# Patient Record
Sex: MALE | Race: WHITE | NOT HISPANIC OR LATINO | Employment: OTHER | URBAN - METROPOLITAN AREA
[De-identification: names, ages, dates, MRNs, and addresses within clinical notes are randomized per-mention and may not be internally consistent; named-entity substitution may affect disease eponyms.]

---

## 2017-04-10 ENCOUNTER — LAB (OUTPATIENT)
Dept: LAB | Facility: HOSPITAL | Age: 67
End: 2017-04-10

## 2017-04-10 ENCOUNTER — TRANSCRIBE ORDERS (OUTPATIENT)
Dept: ADMINISTRATIVE | Facility: HOSPITAL | Age: 67
End: 2017-04-10

## 2017-04-10 DIAGNOSIS — I10 ESSENTIAL HYPERTENSION, MALIGNANT: Primary | ICD-10-CM

## 2017-04-10 DIAGNOSIS — E78.5 HYPERLIPIDEMIA, UNSPECIFIED HYPERLIPIDEMIA TYPE: ICD-10-CM

## 2017-04-10 DIAGNOSIS — I10 ESSENTIAL HYPERTENSION, MALIGNANT: ICD-10-CM

## 2017-04-10 DIAGNOSIS — E11.9 DIABETES MELLITUS WITHOUT COMPLICATION (HCC): ICD-10-CM

## 2017-04-10 LAB
ALBUMIN SERPL-MCNC: 3.6 G/DL (ref 3.5–5.2)
ALBUMIN UR-MCNC: <1.2 MG/L
ALBUMIN/GLOB SERPL: 1.2 G/DL
ALP SERPL-CCNC: 86 U/L (ref 39–117)
ALT SERPL W P-5'-P-CCNC: 13 U/L (ref 1–41)
ANION GAP SERPL CALCULATED.3IONS-SCNC: 13.3 MMOL/L
AST SERPL-CCNC: 15 U/L (ref 1–40)
BILIRUB SERPL-MCNC: 0.4 MG/DL (ref 0.1–1.2)
BILIRUB UR QL STRIP: NEGATIVE
BUN BLD-MCNC: 14 MG/DL (ref 8–23)
BUN/CREAT SERPL: 14.9 (ref 7–25)
CALCIUM SPEC-SCNC: 9.1 MG/DL (ref 8.6–10.5)
CHLORIDE SERPL-SCNC: 98 MMOL/L (ref 98–107)
CHOLEST SERPL-MCNC: 126 MG/DL (ref 0–200)
CLARITY UR: CLEAR
CO2 SERPL-SCNC: 27.7 MMOL/L (ref 22–29)
COLOR UR: YELLOW
CREAT BLD-MCNC: 0.94 MG/DL (ref 0.76–1.27)
CREAT UR-MCNC: 83 MG/DL
GFR SERPL CREATININE-BSD FRML MDRD: 80 ML/MIN/1.73
GLOBULIN UR ELPH-MCNC: 2.9 GM/DL
GLUCOSE BLD-MCNC: 122 MG/DL (ref 65–99)
GLUCOSE UR STRIP-MCNC: ABNORMAL MG/DL
HBA1C MFR BLD: 6.3 % (ref 4.8–5.6)
HDLC SERPL-MCNC: 37 MG/DL (ref 40–60)
HGB UR QL STRIP.AUTO: NEGATIVE
KETONES UR QL STRIP: ABNORMAL
LDLC SERPL CALC-MCNC: 54 MG/DL (ref 0–100)
LDLC/HDLC SERPL: 1.46 {RATIO}
LEUKOCYTE ESTERASE UR QL STRIP.AUTO: NEGATIVE
MICROALBUMIN/CREAT UR: NORMAL MG/G
NITRITE UR QL STRIP: NEGATIVE
PH UR STRIP.AUTO: 5.5 [PH] (ref 5–8)
POTASSIUM BLD-SCNC: 3.6 MMOL/L (ref 3.5–5.2)
PROT SERPL-MCNC: 6.5 G/DL (ref 6–8.5)
PROT UR QL STRIP: NEGATIVE
SODIUM BLD-SCNC: 139 MMOL/L (ref 136–145)
SP GR UR STRIP: 1.03 (ref 1–1.03)
TRIGL SERPL-MCNC: 175 MG/DL (ref 0–150)
UROBILINOGEN UR QL STRIP: ABNORMAL
VLDLC SERPL-MCNC: 35 MG/DL (ref 5–40)

## 2017-04-10 PROCEDURE — 36415 COLL VENOUS BLD VENIPUNCTURE: CPT

## 2017-04-10 PROCEDURE — 82570 ASSAY OF URINE CREATININE: CPT | Performed by: INTERNAL MEDICINE

## 2017-04-10 PROCEDURE — 83036 HEMOGLOBIN GLYCOSYLATED A1C: CPT | Performed by: INTERNAL MEDICINE

## 2017-04-10 PROCEDURE — 82043 UR ALBUMIN QUANTITATIVE: CPT | Performed by: INTERNAL MEDICINE

## 2017-04-10 PROCEDURE — 81003 URINALYSIS AUTO W/O SCOPE: CPT | Performed by: INTERNAL MEDICINE

## 2017-04-10 PROCEDURE — 80061 LIPID PANEL: CPT | Performed by: INTERNAL MEDICINE

## 2017-04-10 PROCEDURE — 80053 COMPREHEN METABOLIC PANEL: CPT | Performed by: INTERNAL MEDICINE

## 2017-04-12 ENCOUNTER — HOSPITAL ENCOUNTER (OUTPATIENT)
Dept: GENERAL RADIOLOGY | Facility: HOSPITAL | Age: 67
Discharge: HOME | End: 2017-04-12
Admitting: INTERNAL MEDICINE

## 2017-04-12 ENCOUNTER — TRANSCRIBE ORDERS (OUTPATIENT)
Dept: ADMINISTRATIVE | Facility: HOSPITAL | Age: 67
End: 2017-04-12

## 2017-04-12 DIAGNOSIS — M25.561 RIGHT KNEE PAIN, UNSPECIFIED CHRONICITY: Primary | ICD-10-CM

## 2017-04-12 DIAGNOSIS — M25.561 RIGHT KNEE PAIN, UNSPECIFIED CHRONICITY: ICD-10-CM

## 2017-04-12 PROCEDURE — 73560 X-RAY EXAM OF KNEE 1 OR 2: CPT

## 2017-07-17 ENCOUNTER — LAB (OUTPATIENT)
Dept: LAB | Facility: HOSPITAL | Age: 67
End: 2017-07-17

## 2017-07-17 ENCOUNTER — TRANSCRIBE ORDERS (OUTPATIENT)
Dept: ADMINISTRATIVE | Facility: HOSPITAL | Age: 67
End: 2017-07-17

## 2017-07-17 DIAGNOSIS — E78.5 HYPERLIPIDEMIA, UNSPECIFIED HYPERLIPIDEMIA TYPE: ICD-10-CM

## 2017-07-17 DIAGNOSIS — E11.9 DIABETES MELLITUS WITHOUT COMPLICATION (HCC): Primary | ICD-10-CM

## 2017-07-17 DIAGNOSIS — I10 ESSENTIAL HYPERTENSION, MALIGNANT: ICD-10-CM

## 2017-07-17 DIAGNOSIS — E11.9 DIABETES MELLITUS WITHOUT COMPLICATION (HCC): ICD-10-CM

## 2017-07-17 LAB
ALBUMIN SERPL-MCNC: 4.1 G/DL (ref 3.5–5.2)
ALBUMIN/GLOB SERPL: 1.4 G/DL
ALP SERPL-CCNC: 90 U/L (ref 39–117)
ALT SERPL W P-5'-P-CCNC: 21 U/L (ref 1–41)
ANION GAP SERPL CALCULATED.3IONS-SCNC: 12 MMOL/L
AST SERPL-CCNC: 19 U/L (ref 1–40)
BILIRUB SERPL-MCNC: 0.9 MG/DL (ref 0.1–1.2)
BILIRUB UR QL STRIP: NEGATIVE
BUN BLD-MCNC: 7 MG/DL (ref 8–23)
BUN/CREAT SERPL: 8.1 (ref 7–25)
CALCIUM SPEC-SCNC: 9.8 MG/DL (ref 8.6–10.5)
CHLORIDE SERPL-SCNC: 99 MMOL/L (ref 98–107)
CHOLEST SERPL-MCNC: 140 MG/DL (ref 0–200)
CLARITY UR: CLEAR
CO2 SERPL-SCNC: 29 MMOL/L (ref 22–29)
COLOR UR: ABNORMAL
CREAT BLD-MCNC: 0.86 MG/DL (ref 0.76–1.27)
GFR SERPL CREATININE-BSD FRML MDRD: 88 ML/MIN/1.73
GLOBULIN UR ELPH-MCNC: 3 GM/DL
GLUCOSE BLD-MCNC: 139 MG/DL (ref 65–99)
GLUCOSE UR STRIP-MCNC: ABNORMAL MG/DL
HBA1C MFR BLD: 6.2 % (ref 4.8–5.6)
HDLC SERPL-MCNC: 44 MG/DL (ref 40–60)
HGB UR QL STRIP.AUTO: NEGATIVE
KETONES UR QL STRIP: ABNORMAL
LDLC SERPL CALC-MCNC: 60 MG/DL (ref 0–100)
LDLC/HDLC SERPL: 1.37 {RATIO}
LEUKOCYTE ESTERASE UR QL STRIP.AUTO: NEGATIVE
NITRITE UR QL STRIP: NEGATIVE
PH UR STRIP.AUTO: 6 [PH] (ref 5–8)
POTASSIUM BLD-SCNC: 4.2 MMOL/L (ref 3.5–5.2)
PROT SERPL-MCNC: 7.1 G/DL (ref 6–8.5)
PROT UR QL STRIP: ABNORMAL
SODIUM BLD-SCNC: 140 MMOL/L (ref 136–145)
SP GR UR STRIP: >=1.03 (ref 1–1.03)
TRIGL SERPL-MCNC: 178 MG/DL (ref 0–150)
UROBILINOGEN UR QL STRIP: ABNORMAL
VLDLC SERPL-MCNC: 35.6 MG/DL (ref 5–40)

## 2017-07-17 PROCEDURE — 80053 COMPREHEN METABOLIC PANEL: CPT | Performed by: INTERNAL MEDICINE

## 2017-07-17 PROCEDURE — 80061 LIPID PANEL: CPT | Performed by: INTERNAL MEDICINE

## 2017-07-17 PROCEDURE — 36415 COLL VENOUS BLD VENIPUNCTURE: CPT

## 2017-07-17 PROCEDURE — 83036 HEMOGLOBIN GLYCOSYLATED A1C: CPT | Performed by: INTERNAL MEDICINE

## 2017-07-17 PROCEDURE — 81003 URINALYSIS AUTO W/O SCOPE: CPT | Performed by: INTERNAL MEDICINE

## 2017-11-15 ENCOUNTER — TRANSCRIBE ORDERS (OUTPATIENT)
Dept: ADMINISTRATIVE | Facility: HOSPITAL | Age: 67
End: 2017-11-15

## 2017-11-15 ENCOUNTER — LAB (OUTPATIENT)
Dept: LAB | Facility: HOSPITAL | Age: 67
End: 2017-11-15

## 2017-11-15 DIAGNOSIS — E78.5 HYPERLIPIDEMIA, UNSPECIFIED HYPERLIPIDEMIA TYPE: ICD-10-CM

## 2017-11-15 DIAGNOSIS — Z00.00 ROUTINE GENERAL MEDICAL EXAMINATION AT A HEALTH CARE FACILITY: ICD-10-CM

## 2017-11-15 DIAGNOSIS — Z00.00 ROUTINE GENERAL MEDICAL EXAMINATION AT A HEALTH CARE FACILITY: Primary | ICD-10-CM

## 2017-11-15 DIAGNOSIS — Z12.5 SPECIAL SCREENING FOR MALIGNANT NEOPLASM OF PROSTATE: ICD-10-CM

## 2017-11-15 DIAGNOSIS — I10 ESSENTIAL HYPERTENSION, MALIGNANT: ICD-10-CM

## 2017-11-15 DIAGNOSIS — E11.9 DIABETES MELLITUS WITHOUT COMPLICATION (HCC): ICD-10-CM

## 2017-11-15 LAB
ALBUMIN SERPL-MCNC: 3.6 G/DL (ref 3.5–5.2)
ALBUMIN/GLOB SERPL: 1.3 G/DL
ALP SERPL-CCNC: 77 U/L (ref 39–117)
ALT SERPL W P-5'-P-CCNC: 7 U/L (ref 1–41)
ANION GAP SERPL CALCULATED.3IONS-SCNC: 9.8 MMOL/L
AST SERPL-CCNC: 12 U/L (ref 1–40)
BASOPHILS # BLD AUTO: 0.02 10*3/MM3 (ref 0–0.2)
BASOPHILS NFR BLD AUTO: 0.2 % (ref 0–1.5)
BILIRUB SERPL-MCNC: 0.6 MG/DL (ref 0.1–1.2)
BILIRUB UR QL STRIP: NEGATIVE
BUN BLD-MCNC: 16 MG/DL (ref 8–23)
BUN/CREAT SERPL: 18 (ref 7–25)
CALCIUM SPEC-SCNC: 8.9 MG/DL (ref 8.6–10.5)
CHLORIDE SERPL-SCNC: 97 MMOL/L (ref 98–107)
CHOLEST SERPL-MCNC: 117 MG/DL (ref 0–200)
CLARITY UR: CLEAR
CO2 SERPL-SCNC: 30.2 MMOL/L (ref 22–29)
COLOR UR: YELLOW
CREAT BLD-MCNC: 0.89 MG/DL (ref 0.76–1.27)
DEPRECATED RDW RBC AUTO: 42.3 FL (ref 37–54)
EOSINOPHIL # BLD AUTO: 0.22 10*3/MM3 (ref 0–0.7)
EOSINOPHIL NFR BLD AUTO: 1.7 % (ref 0.3–6.2)
ERYTHROCYTE [DISTWIDTH] IN BLOOD BY AUTOMATED COUNT: 13.6 % (ref 11.5–14.5)
GFR SERPL CREATININE-BSD FRML MDRD: 85 ML/MIN/1.73
GLOBULIN UR ELPH-MCNC: 2.7 GM/DL
GLUCOSE BLD-MCNC: 143 MG/DL (ref 65–99)
GLUCOSE UR STRIP-MCNC: ABNORMAL MG/DL
HBA1C MFR BLD: 6.1 % (ref 4.8–5.6)
HCT VFR BLD AUTO: 44.4 % (ref 40.4–52.2)
HDLC SERPL-MCNC: 38 MG/DL (ref 40–60)
HGB BLD-MCNC: 15.1 G/DL (ref 13.7–17.6)
HGB UR QL STRIP.AUTO: NEGATIVE
IMM GRANULOCYTES # BLD: 0.07 10*3/MM3 (ref 0–0.03)
IMM GRANULOCYTES NFR BLD: 0.5 % (ref 0–0.5)
KETONES UR QL STRIP: ABNORMAL
LDLC SERPL CALC-MCNC: 50 MG/DL (ref 0–100)
LDLC/HDLC SERPL: 1.31 {RATIO}
LEUKOCYTE ESTERASE UR QL STRIP.AUTO: NEGATIVE
LYMPHOCYTES # BLD AUTO: 1.13 10*3/MM3 (ref 0.9–4.8)
LYMPHOCYTES NFR BLD AUTO: 8.9 % (ref 19.6–45.3)
MCH RBC QN AUTO: 29 PG (ref 27–32.7)
MCHC RBC AUTO-ENTMCNC: 34 G/DL (ref 32.6–36.4)
MCV RBC AUTO: 85.2 FL (ref 79.8–96.2)
MONOCYTES # BLD AUTO: 1.31 10*3/MM3 (ref 0.2–1.2)
MONOCYTES NFR BLD AUTO: 10.3 % (ref 5–12)
NEUTROPHILS # BLD AUTO: 9.99 10*3/MM3 (ref 1.9–8.1)
NEUTROPHILS NFR BLD AUTO: 78.4 % (ref 42.7–76)
NITRITE UR QL STRIP: NEGATIVE
PH UR STRIP.AUTO: 6 [PH] (ref 5–8)
PLATELET # BLD AUTO: 205 10*3/MM3 (ref 140–500)
PMV BLD AUTO: 10.7 FL (ref 6–12)
POTASSIUM BLD-SCNC: 4.2 MMOL/L (ref 3.5–5.2)
PROT SERPL-MCNC: 6.3 G/DL (ref 6–8.5)
PROT UR QL STRIP: ABNORMAL
PSA SERPL-MCNC: 4.04 NG/ML (ref 0–4)
RBC # BLD AUTO: 5.21 10*6/MM3 (ref 4.6–6)
SODIUM BLD-SCNC: 137 MMOL/L (ref 136–145)
SP GR UR STRIP: >=1.03 (ref 1–1.03)
TRIGL SERPL-MCNC: 147 MG/DL (ref 0–150)
UROBILINOGEN UR QL STRIP: ABNORMAL
VLDLC SERPL-MCNC: 29.4 MG/DL (ref 5–40)
WBC NRBC COR # BLD: 12.74 10*3/MM3 (ref 4.5–10.7)

## 2017-11-15 PROCEDURE — 36415 COLL VENOUS BLD VENIPUNCTURE: CPT

## 2017-11-15 PROCEDURE — 83036 HEMOGLOBIN GLYCOSYLATED A1C: CPT | Performed by: INTERNAL MEDICINE

## 2017-11-15 PROCEDURE — 80061 LIPID PANEL: CPT | Performed by: INTERNAL MEDICINE

## 2017-11-15 PROCEDURE — 81003 URINALYSIS AUTO W/O SCOPE: CPT | Performed by: INTERNAL MEDICINE

## 2017-11-15 PROCEDURE — 80053 COMPREHEN METABOLIC PANEL: CPT | Performed by: INTERNAL MEDICINE

## 2017-11-15 PROCEDURE — G0103 PSA SCREENING: HCPCS | Performed by: INTERNAL MEDICINE

## 2017-11-15 PROCEDURE — 85025 COMPLETE CBC W/AUTO DIFF WBC: CPT | Performed by: INTERNAL MEDICINE

## 2018-02-16 ENCOUNTER — TRANSCRIBE ORDERS (OUTPATIENT)
Dept: ADMINISTRATIVE | Facility: HOSPITAL | Age: 68
End: 2018-02-16

## 2018-02-16 ENCOUNTER — LAB (OUTPATIENT)
Dept: LAB | Facility: HOSPITAL | Age: 68
End: 2018-02-16

## 2018-02-16 DIAGNOSIS — I10 ESSENTIAL HYPERTENSION, MALIGNANT: ICD-10-CM

## 2018-02-16 DIAGNOSIS — E78.5 HYPERLIPIDEMIA, UNSPECIFIED HYPERLIPIDEMIA TYPE: ICD-10-CM

## 2018-02-16 DIAGNOSIS — E11.9 DIABETES MELLITUS WITHOUT COMPLICATION (HCC): Primary | ICD-10-CM

## 2018-02-16 DIAGNOSIS — E11.9 DIABETES MELLITUS WITHOUT COMPLICATION (HCC): ICD-10-CM

## 2018-02-16 LAB
ALBUMIN SERPL-MCNC: 3.8 G/DL (ref 3.5–5.2)
ALBUMIN/GLOB SERPL: 1.6 G/DL
ALP SERPL-CCNC: 82 U/L (ref 39–117)
ALT SERPL W P-5'-P-CCNC: 16 U/L (ref 1–41)
ANION GAP SERPL CALCULATED.3IONS-SCNC: 11.1 MMOL/L
AST SERPL-CCNC: 15 U/L (ref 1–40)
BASOPHILS # BLD AUTO: 0.03 10*3/MM3 (ref 0–0.2)
BASOPHILS NFR BLD AUTO: 0.4 % (ref 0–1.5)
BILIRUB SERPL-MCNC: 0.4 MG/DL (ref 0.1–1.2)
BILIRUB UR QL STRIP: NEGATIVE
BUN BLD-MCNC: 12 MG/DL (ref 8–23)
BUN/CREAT SERPL: 13.3 (ref 7–25)
CALCIUM SPEC-SCNC: 9.2 MG/DL (ref 8.6–10.5)
CHLORIDE SERPL-SCNC: 95 MMOL/L (ref 98–107)
CHOLEST SERPL-MCNC: 162 MG/DL (ref 0–200)
CLARITY UR: CLEAR
CO2 SERPL-SCNC: 30.9 MMOL/L (ref 22–29)
COLOR UR: YELLOW
CREAT BLD-MCNC: 0.9 MG/DL (ref 0.76–1.27)
DEPRECATED RDW RBC AUTO: 42 FL (ref 37–54)
EOSINOPHIL # BLD AUTO: 0.24 10*3/MM3 (ref 0–0.7)
EOSINOPHIL NFR BLD AUTO: 3.1 % (ref 0.3–6.2)
ERYTHROCYTE [DISTWIDTH] IN BLOOD BY AUTOMATED COUNT: 13.6 % (ref 11.5–14.5)
GFR SERPL CREATININE-BSD FRML MDRD: 84 ML/MIN/1.73
GLOBULIN UR ELPH-MCNC: 2.4 GM/DL
GLUCOSE BLD-MCNC: 172 MG/DL (ref 65–99)
GLUCOSE UR STRIP-MCNC: ABNORMAL MG/DL
HBA1C MFR BLD: 6.75 % (ref 4.8–5.6)
HCT VFR BLD AUTO: 46.1 % (ref 40.4–52.2)
HDLC SERPL-MCNC: 43 MG/DL (ref 40–60)
HGB BLD-MCNC: 15.4 G/DL (ref 13.7–17.6)
HGB UR QL STRIP.AUTO: NEGATIVE
IMM GRANULOCYTES # BLD: 0.08 10*3/MM3 (ref 0–0.03)
IMM GRANULOCYTES NFR BLD: 1 % (ref 0–0.5)
KETONES UR QL STRIP: NEGATIVE
LDLC SERPL CALC-MCNC: 81 MG/DL (ref 0–100)
LDLC/HDLC SERPL: 1.89 {RATIO}
LEUKOCYTE ESTERASE UR QL STRIP.AUTO: NEGATIVE
LYMPHOCYTES # BLD AUTO: 1.39 10*3/MM3 (ref 0.9–4.8)
LYMPHOCYTES NFR BLD AUTO: 17.8 % (ref 19.6–45.3)
MCH RBC QN AUTO: 28.5 PG (ref 27–32.7)
MCHC RBC AUTO-ENTMCNC: 33.4 G/DL (ref 32.6–36.4)
MCV RBC AUTO: 85.4 FL (ref 79.8–96.2)
MONOCYTES # BLD AUTO: 0.76 10*3/MM3 (ref 0.2–1.2)
MONOCYTES NFR BLD AUTO: 9.7 % (ref 5–12)
NEUTROPHILS # BLD AUTO: 5.31 10*3/MM3 (ref 1.9–8.1)
NEUTROPHILS NFR BLD AUTO: 68 % (ref 42.7–76)
NITRITE UR QL STRIP: NEGATIVE
PH UR STRIP.AUTO: 5.5 [PH] (ref 5–8)
PLATELET # BLD AUTO: 222 10*3/MM3 (ref 140–500)
PMV BLD AUTO: 10.1 FL (ref 6–12)
POTASSIUM BLD-SCNC: 3.9 MMOL/L (ref 3.5–5.2)
PROT SERPL-MCNC: 6.2 G/DL (ref 6–8.5)
PROT UR QL STRIP: ABNORMAL
RBC # BLD AUTO: 5.4 10*6/MM3 (ref 4.6–6)
SODIUM BLD-SCNC: 137 MMOL/L (ref 136–145)
SP GR UR STRIP: >=1.03 (ref 1–1.03)
TRIGL SERPL-MCNC: 189 MG/DL (ref 0–150)
UROBILINOGEN UR QL STRIP: ABNORMAL
VLDLC SERPL-MCNC: 37.8 MG/DL (ref 5–40)
WBC NRBC COR # BLD: 7.81 10*3/MM3 (ref 4.5–10.7)

## 2018-02-16 PROCEDURE — 80061 LIPID PANEL: CPT | Performed by: INTERNAL MEDICINE

## 2018-02-16 PROCEDURE — 81003 URINALYSIS AUTO W/O SCOPE: CPT | Performed by: INTERNAL MEDICINE

## 2018-02-16 PROCEDURE — 36415 COLL VENOUS BLD VENIPUNCTURE: CPT

## 2018-02-16 PROCEDURE — 83036 HEMOGLOBIN GLYCOSYLATED A1C: CPT | Performed by: INTERNAL MEDICINE

## 2018-02-16 PROCEDURE — 80053 COMPREHEN METABOLIC PANEL: CPT | Performed by: INTERNAL MEDICINE

## 2018-02-16 PROCEDURE — 85025 COMPLETE CBC W/AUTO DIFF WBC: CPT | Performed by: INTERNAL MEDICINE

## 2018-06-01 ENCOUNTER — TRANSCRIBE ORDERS (OUTPATIENT)
Dept: ADMINISTRATIVE | Facility: HOSPITAL | Age: 68
End: 2018-06-01

## 2018-06-01 ENCOUNTER — LAB (OUTPATIENT)
Dept: LAB | Facility: HOSPITAL | Age: 68
End: 2018-06-01

## 2018-06-01 DIAGNOSIS — E11.9 DIABETES MELLITUS WITHOUT COMPLICATION (HCC): Primary | ICD-10-CM

## 2018-06-01 DIAGNOSIS — I10 ESSENTIAL HYPERTENSION, MALIGNANT: ICD-10-CM

## 2018-06-01 DIAGNOSIS — E78.5 HYPERLIPIDEMIA, UNSPECIFIED HYPERLIPIDEMIA TYPE: ICD-10-CM

## 2018-06-01 DIAGNOSIS — E11.9 DIABETES MELLITUS WITHOUT COMPLICATION (HCC): ICD-10-CM

## 2018-06-01 DIAGNOSIS — R97.20 ELEVATED PSA: ICD-10-CM

## 2018-06-01 LAB
ALBUMIN SERPL-MCNC: 4 G/DL (ref 3.5–5.2)
ALBUMIN/GLOB SERPL: 1.4 G/DL
ALP SERPL-CCNC: 87 U/L (ref 39–117)
ALT SERPL W P-5'-P-CCNC: 13 U/L (ref 1–41)
ANION GAP SERPL CALCULATED.3IONS-SCNC: 9.3 MMOL/L
AST SERPL-CCNC: 11 U/L (ref 1–40)
BILIRUB SERPL-MCNC: 0.6 MG/DL (ref 0.1–1.2)
BILIRUB UR QL STRIP: NEGATIVE
BUN BLD-MCNC: 13 MG/DL (ref 8–23)
BUN/CREAT SERPL: 13.8 (ref 7–25)
CALCIUM SPEC-SCNC: 9.6 MG/DL (ref 8.6–10.5)
CHLORIDE SERPL-SCNC: 100 MMOL/L (ref 98–107)
CHOLEST SERPL-MCNC: 150 MG/DL (ref 0–200)
CLARITY UR: CLEAR
CO2 SERPL-SCNC: 29.7 MMOL/L (ref 22–29)
COLOR UR: YELLOW
CREAT BLD-MCNC: 0.94 MG/DL (ref 0.76–1.27)
GFR SERPL CREATININE-BSD FRML MDRD: 80 ML/MIN/1.73
GLOBULIN UR ELPH-MCNC: 2.9 GM/DL
GLUCOSE BLD-MCNC: 155 MG/DL (ref 65–99)
GLUCOSE UR STRIP-MCNC: ABNORMAL MG/DL
HBA1C MFR BLD: 6.83 % (ref 4.8–5.6)
HDLC SERPL-MCNC: 41 MG/DL (ref 40–60)
HGB UR QL STRIP.AUTO: NEGATIVE
KETONES UR QL STRIP: ABNORMAL
LDLC SERPL CALC-MCNC: 78 MG/DL (ref 0–100)
LDLC/HDLC SERPL: 1.89 {RATIO}
LEUKOCYTE ESTERASE UR QL STRIP.AUTO: NEGATIVE
NITRITE UR QL STRIP: NEGATIVE
PH UR STRIP.AUTO: 5.5 [PH] (ref 5–8)
POTASSIUM BLD-SCNC: 4.3 MMOL/L (ref 3.5–5.2)
PROT SERPL-MCNC: 6.9 G/DL (ref 6–8.5)
PROT UR QL STRIP: NEGATIVE
PSA SERPL-MCNC: 4.12 NG/ML (ref 0–4)
SODIUM BLD-SCNC: 139 MMOL/L (ref 136–145)
SP GR UR STRIP: >=1.03 (ref 1–1.03)
TRIGL SERPL-MCNC: 157 MG/DL (ref 0–150)
UROBILINOGEN UR QL STRIP: ABNORMAL
VLDLC SERPL-MCNC: 31.4 MG/DL (ref 5–40)

## 2018-06-01 PROCEDURE — 36415 COLL VENOUS BLD VENIPUNCTURE: CPT

## 2018-06-01 PROCEDURE — 80061 LIPID PANEL: CPT | Performed by: INTERNAL MEDICINE

## 2018-06-01 PROCEDURE — 80053 COMPREHEN METABOLIC PANEL: CPT | Performed by: INTERNAL MEDICINE

## 2018-06-01 PROCEDURE — 83036 HEMOGLOBIN GLYCOSYLATED A1C: CPT | Performed by: INTERNAL MEDICINE

## 2018-06-01 PROCEDURE — 81003 URINALYSIS AUTO W/O SCOPE: CPT | Performed by: INTERNAL MEDICINE

## 2018-06-01 PROCEDURE — 84153 ASSAY OF PSA TOTAL: CPT | Performed by: INTERNAL MEDICINE

## 2018-10-10 ENCOUNTER — TRANSCRIBE ORDERS (OUTPATIENT)
Dept: ADMINISTRATIVE | Facility: HOSPITAL | Age: 68
End: 2018-10-10

## 2018-10-10 ENCOUNTER — LAB (OUTPATIENT)
Dept: LAB | Facility: HOSPITAL | Age: 68
End: 2018-10-10

## 2018-10-10 DIAGNOSIS — E78.5 HYPERLIPIDEMIA, UNSPECIFIED HYPERLIPIDEMIA TYPE: ICD-10-CM

## 2018-10-10 DIAGNOSIS — I10 ESSENTIAL HYPERTENSION, MALIGNANT: ICD-10-CM

## 2018-10-10 DIAGNOSIS — E11.9 DIABETES MELLITUS WITHOUT COMPLICATION (HCC): ICD-10-CM

## 2018-10-10 DIAGNOSIS — E11.9 DIABETES MELLITUS WITHOUT COMPLICATION (HCC): Primary | ICD-10-CM

## 2018-10-10 LAB
ALBUMIN SERPL-MCNC: 3.9 G/DL (ref 3.5–5.2)
ALBUMIN/GLOB SERPL: 1.6 G/DL
ALP SERPL-CCNC: 75 U/L (ref 39–117)
ALT SERPL W P-5'-P-CCNC: 12 U/L (ref 1–41)
ANION GAP SERPL CALCULATED.3IONS-SCNC: 12.8 MMOL/L
AST SERPL-CCNC: 10 U/L (ref 1–40)
BACTERIA UR QL AUTO: NORMAL /HPF
BILIRUB SERPL-MCNC: 0.8 MG/DL (ref 0.1–1.2)
BILIRUB UR QL STRIP: NEGATIVE
BUN BLD-MCNC: 11 MG/DL (ref 8–23)
BUN/CREAT SERPL: 13.6 (ref 7–25)
CALCIUM SPEC-SCNC: 9.1 MG/DL (ref 8.6–10.5)
CHLORIDE SERPL-SCNC: 100 MMOL/L (ref 98–107)
CHOLEST SERPL-MCNC: 127 MG/DL (ref 0–200)
CLARITY UR: CLEAR
CO2 SERPL-SCNC: 27.2 MMOL/L (ref 22–29)
COLOR UR: YELLOW
CREAT BLD-MCNC: 0.81 MG/DL (ref 0.76–1.27)
GFR SERPL CREATININE-BSD FRML MDRD: 94 ML/MIN/1.73
GLOBULIN UR ELPH-MCNC: 2.4 GM/DL
GLUCOSE BLD-MCNC: 160 MG/DL (ref 65–99)
GLUCOSE UR STRIP-MCNC: NEGATIVE MG/DL
HBA1C MFR BLD: 7.2 % (ref 4.8–5.6)
HDLC SERPL-MCNC: 44 MG/DL (ref 40–60)
HGB UR QL STRIP.AUTO: NEGATIVE
HYALINE CASTS UR QL AUTO: NORMAL /LPF
KETONES UR QL STRIP: NEGATIVE
LDLC SERPL CALC-MCNC: 58 MG/DL (ref 0–100)
LDLC/HDLC SERPL: 1.31 {RATIO}
LEUKOCYTE ESTERASE UR QL STRIP.AUTO: NEGATIVE
NITRITE UR QL STRIP: NEGATIVE
PH UR STRIP.AUTO: 6 [PH] (ref 5–8)
POTASSIUM BLD-SCNC: 4 MMOL/L (ref 3.5–5.2)
PROT SERPL-MCNC: 6.3 G/DL (ref 6–8.5)
PROT UR QL STRIP: ABNORMAL
RBC # UR: NORMAL /HPF
REF LAB TEST METHOD: NORMAL
SODIUM BLD-SCNC: 140 MMOL/L (ref 136–145)
SP GR UR STRIP: 1.01 (ref 1–1.03)
SQUAMOUS #/AREA URNS HPF: NORMAL /HPF
TRIGL SERPL-MCNC: 127 MG/DL (ref 0–150)
UROBILINOGEN UR QL STRIP: ABNORMAL
VLDLC SERPL-MCNC: 25.4 MG/DL (ref 5–40)
WBC UR QL AUTO: NORMAL /HPF

## 2018-10-10 PROCEDURE — 80053 COMPREHEN METABOLIC PANEL: CPT | Performed by: INTERNAL MEDICINE

## 2018-10-10 PROCEDURE — 36415 COLL VENOUS BLD VENIPUNCTURE: CPT

## 2018-10-10 PROCEDURE — 83036 HEMOGLOBIN GLYCOSYLATED A1C: CPT | Performed by: INTERNAL MEDICINE

## 2018-10-10 PROCEDURE — 81001 URINALYSIS AUTO W/SCOPE: CPT | Performed by: INTERNAL MEDICINE

## 2018-10-10 PROCEDURE — 80061 LIPID PANEL: CPT | Performed by: INTERNAL MEDICINE

## 2019-01-11 ENCOUNTER — LAB (OUTPATIENT)
Dept: LAB | Facility: HOSPITAL | Age: 69
End: 2019-01-11

## 2019-01-11 ENCOUNTER — TRANSCRIBE ORDERS (OUTPATIENT)
Dept: ADMINISTRATIVE | Facility: HOSPITAL | Age: 69
End: 2019-01-11

## 2019-01-11 DIAGNOSIS — Z12.5 SPECIAL SCREENING FOR MALIGNANT NEOPLASM OF PROSTATE: ICD-10-CM

## 2019-01-11 DIAGNOSIS — Z00.00 ROUTINE GENERAL MEDICAL EXAMINATION AT A HEALTH CARE FACILITY: ICD-10-CM

## 2019-01-11 DIAGNOSIS — I10 ESSENTIAL HYPERTENSION, MALIGNANT: ICD-10-CM

## 2019-01-11 DIAGNOSIS — E11.9 DIABETES MELLITUS WITHOUT COMPLICATION (HCC): Primary | ICD-10-CM

## 2019-01-11 DIAGNOSIS — E11.9 DIABETES MELLITUS WITHOUT COMPLICATION (HCC): ICD-10-CM

## 2019-01-11 LAB
ALBUMIN SERPL-MCNC: 4.1 G/DL (ref 3.5–5.2)
ALBUMIN/GLOB SERPL: 1.8 G/DL
ALP SERPL-CCNC: 68 U/L (ref 39–117)
ALT SERPL W P-5'-P-CCNC: 15 U/L (ref 1–41)
ANION GAP SERPL CALCULATED.3IONS-SCNC: 12.3 MMOL/L
AST SERPL-CCNC: 17 U/L (ref 1–40)
BACTERIA UR QL AUTO: NORMAL /HPF
BASOPHILS # BLD AUTO: 0.02 10*3/MM3 (ref 0–0.2)
BASOPHILS NFR BLD AUTO: 0.3 % (ref 0–1.5)
BILIRUB SERPL-MCNC: 0.7 MG/DL (ref 0.1–1.2)
BILIRUB UR QL STRIP: NEGATIVE
BUN BLD-MCNC: 16 MG/DL (ref 8–23)
BUN/CREAT SERPL: 16.3 (ref 7–25)
CALCIUM SPEC-SCNC: 9.5 MG/DL (ref 8.6–10.5)
CHLORIDE SERPL-SCNC: 101 MMOL/L (ref 98–107)
CHOLEST SERPL-MCNC: 136 MG/DL (ref 0–200)
CLARITY UR: CLEAR
CO2 SERPL-SCNC: 27.7 MMOL/L (ref 22–29)
COLOR UR: YELLOW
CREAT BLD-MCNC: 0.98 MG/DL (ref 0.76–1.27)
DEPRECATED RDW RBC AUTO: 45.7 FL (ref 37–54)
EOSINOPHIL # BLD AUTO: 0.17 10*3/MM3 (ref 0–0.7)
EOSINOPHIL NFR BLD AUTO: 2.2 % (ref 0.3–6.2)
ERYTHROCYTE [DISTWIDTH] IN BLOOD BY AUTOMATED COUNT: 14.3 % (ref 11.5–14.5)
GFR SERPL CREATININE-BSD FRML MDRD: 76 ML/MIN/1.73
GLOBULIN UR ELPH-MCNC: 2.3 GM/DL
GLUCOSE BLD-MCNC: 140 MG/DL (ref 65–99)
GLUCOSE UR STRIP-MCNC: ABNORMAL MG/DL
HBA1C MFR BLD: 7.67 % (ref 4.8–5.6)
HCT VFR BLD AUTO: 52.4 % (ref 40.4–52.2)
HDLC SERPL-MCNC: 37 MG/DL (ref 40–60)
HGB BLD-MCNC: 16.9 G/DL (ref 13.7–17.6)
HGB UR QL STRIP.AUTO: NEGATIVE
HYALINE CASTS UR QL AUTO: NORMAL /LPF
IMM GRANULOCYTES # BLD AUTO: 0.08 10*3/MM3 (ref 0–0.03)
IMM GRANULOCYTES NFR BLD AUTO: 1 % (ref 0–0.5)
KETONES UR QL STRIP: ABNORMAL
LDLC SERPL CALC-MCNC: 66 MG/DL (ref 0–100)
LDLC/HDLC SERPL: 1.78 {RATIO}
LEUKOCYTE ESTERASE UR QL STRIP.AUTO: NEGATIVE
LYMPHOCYTES # BLD AUTO: 1.74 10*3/MM3 (ref 0.9–4.8)
LYMPHOCYTES NFR BLD AUTO: 22.3 % (ref 19.6–45.3)
MCH RBC QN AUTO: 28.5 PG (ref 27–32.7)
MCHC RBC AUTO-ENTMCNC: 32.3 G/DL (ref 32.6–36.4)
MCV RBC AUTO: 88.5 FL (ref 79.8–96.2)
MONOCYTES # BLD AUTO: 0.58 10*3/MM3 (ref 0.2–1.2)
MONOCYTES NFR BLD AUTO: 7.4 % (ref 5–12)
NEUTROPHILS # BLD AUTO: 5.3 10*3/MM3 (ref 1.9–8.1)
NEUTROPHILS NFR BLD AUTO: 67.8 % (ref 42.7–76)
NITRITE UR QL STRIP: NEGATIVE
PH UR STRIP.AUTO: 5.5 [PH] (ref 5–8)
PLATELET # BLD AUTO: 224 10*3/MM3 (ref 140–500)
PMV BLD AUTO: 10.5 FL (ref 6–12)
POTASSIUM BLD-SCNC: 4.2 MMOL/L (ref 3.5–5.2)
PROT SERPL-MCNC: 6.4 G/DL (ref 6–8.5)
PROT UR QL STRIP: ABNORMAL
PSA SERPL-MCNC: 5.24 NG/ML (ref 0–4)
RBC # BLD AUTO: 5.92 10*6/MM3 (ref 4.6–6)
RBC # UR: NORMAL /HPF
REF LAB TEST METHOD: NORMAL
SODIUM BLD-SCNC: 141 MMOL/L (ref 136–145)
SP GR UR STRIP: >=1.03 (ref 1–1.03)
SQUAMOUS #/AREA URNS HPF: NORMAL /HPF
TRIGL SERPL-MCNC: 165 MG/DL (ref 0–150)
UROBILINOGEN UR QL STRIP: ABNORMAL
VLDLC SERPL-MCNC: 33 MG/DL (ref 5–40)
WBC NRBC COR # BLD: 7.81 10*3/MM3 (ref 4.5–10.7)
WBC UR QL AUTO: NORMAL /HPF

## 2019-01-11 PROCEDURE — 85025 COMPLETE CBC W/AUTO DIFF WBC: CPT | Performed by: INTERNAL MEDICINE

## 2019-01-11 PROCEDURE — 83036 HEMOGLOBIN GLYCOSYLATED A1C: CPT | Performed by: INTERNAL MEDICINE

## 2019-01-11 PROCEDURE — 36415 COLL VENOUS BLD VENIPUNCTURE: CPT

## 2019-01-11 PROCEDURE — G0103 PSA SCREENING: HCPCS | Performed by: INTERNAL MEDICINE

## 2019-01-11 PROCEDURE — 81001 URINALYSIS AUTO W/SCOPE: CPT | Performed by: INTERNAL MEDICINE

## 2019-01-11 PROCEDURE — 80061 LIPID PANEL: CPT | Performed by: INTERNAL MEDICINE

## 2019-01-11 PROCEDURE — 80053 COMPREHEN METABOLIC PANEL: CPT | Performed by: INTERNAL MEDICINE

## 2019-01-25 ENCOUNTER — OFFICE VISIT (OUTPATIENT)
Dept: GASTROENTEROLOGY | Facility: CLINIC | Age: 69
End: 2019-01-25

## 2019-01-25 VITALS
HEIGHT: 69 IN | SYSTOLIC BLOOD PRESSURE: 132 MMHG | DIASTOLIC BLOOD PRESSURE: 72 MMHG | BODY MASS INDEX: 34.24 KG/M2 | TEMPERATURE: 99.1 F | WEIGHT: 231.2 LBS

## 2019-01-25 DIAGNOSIS — K21.9 GASTROESOPHAGEAL REFLUX DISEASE WITHOUT ESOPHAGITIS: ICD-10-CM

## 2019-01-25 DIAGNOSIS — R12 HEARTBURN: Primary | ICD-10-CM

## 2019-01-25 DIAGNOSIS — K63.5 POLYP OF ASCENDING COLON, UNSPECIFIED TYPE: ICD-10-CM

## 2019-01-25 PROCEDURE — 99204 OFFICE O/P NEW MOD 45 MIN: CPT | Performed by: INTERNAL MEDICINE

## 2019-01-25 RX ORDER — DAPAGLIFLOZIN 10 MG/1
1 TABLET, FILM COATED ORAL DAILY
Refills: 3 | COMMUNITY
Start: 2019-01-18

## 2019-01-25 RX ORDER — VALSARTAN AND HYDROCHLOROTHIAZIDE 160; 25 MG/1; MG/1
1 TABLET ORAL DAILY
Refills: 3 | COMMUNITY
Start: 2018-11-24

## 2019-01-25 RX ORDER — OMEPRAZOLE 20 MG/1
CAPSULE, DELAYED RELEASE ORAL
COMMUNITY
End: 2019-02-13

## 2019-01-25 RX ORDER — RANITIDINE 150 MG/1
150 TABLET ORAL NIGHTLY
Qty: 30 TABLET | Refills: 12 | Status: SHIPPED | OUTPATIENT
Start: 2019-01-25 | End: 2019-02-13

## 2019-01-25 RX ORDER — AMLODIPINE BESYLATE 5 MG/1
5 TABLET ORAL DAILY
Refills: 3 | COMMUNITY
Start: 2018-11-24

## 2019-01-25 RX ORDER — SIMVASTATIN 20 MG
20 TABLET ORAL
Refills: 3 | COMMUNITY
Start: 2018-11-24 | End: 2020-07-06 | Stop reason: ALTCHOICE

## 2019-01-25 NOTE — PROGRESS NOTES
Chief Complaint   Patient presents with   • Heartburn       Alfredo Toure is a 68 y.o. male who presents with GERD, not controlled with twice a day PPI, colon polyp    68-year-old gentleman with GERD for 10 years, twice a day PPI Prilosec 20 mg, now symptoms not controlled with heartburn breakthrough, reflux, chest pressure, sour taste in the mouth, coughing worse in the morning.  History of colon polyps removed 10 years ago, colonoscopy -5 years ago        Past Medical History:   Diagnosis Date   • Colon polyp    • Diabetes mellitus (CMS/HCC)    • GERD (gastroesophageal reflux disease)        Past Surgical History:   Procedure Laterality Date   • ABDOMINAL SURGERY      at [facility] [place] [facility]   • CHOLECYSTECTOMY     • COLONOSCOPY           Current Outpatient Medications:   •  amLODIPine (NORVASC) 5 MG tablet, Take 5 mg by mouth Daily., Disp: , Rfl: 3  •  aspirin 81 MG oral suspension, , Disp: , Rfl:   •  FARXIGA 10 MG tablet, Take 1 tablet by mouth Daily., Disp: , Rfl: 3  •  metFORMIN (GLUCOPHAGE) 1000 MG tablet, Take 1,000 mg by mouth 2 (Two) Times a Day., Disp: , Rfl: 3  •  omeprazole (priLOSEC) 20 MG capsule, , Disp: , Rfl:   •  simvastatin (ZOCOR) 20 MG tablet, Take 20 mg by mouth every night at bedtime., Disp: , Rfl: 3  •  valsartan-hydrochlorothiazide (DIOVAN-HCT) 160-25 MG per tablet, Take 1 tablet by mouth Daily., Disp: , Rfl: 3    No Known Allergies    Social History     Socioeconomic History   • Marital status:      Spouse name: Not on file   • Number of children: Not on file   • Years of education: Not on file   • Highest education level: Not on file   Social Needs   • Financial resource strain: Not on file   • Food insecurity - worry: Not on file   • Food insecurity - inability: Not on file   • Transportation needs - medical: Not on file   • Transportation needs - non-medical: Not on file   Occupational History   • Not on file   Tobacco Use   • Smoking status: Never Smoker   Substance and  Sexual Activity   • Alcohol use: Yes     Comment: on occasion - monthly.   alcohol is not the problem   • Drug use: No   • Sexual activity: No   Other Topics Concern   • Not on file   Social History Narrative   • Not on file       Family History   Problem Relation Age of Onset   • Colon cancer Maternal Uncle        Review of Systems   Gastrointestinal: Negative for abdominal distention, abdominal pain, blood in stool, constipation, diarrhea, nausea and rectal pain.   All other systems reviewed and are negative.      Vitals:    01/25/19 1018   BP: 132/72   Temp: 99.1 °F (37.3 °C)       Physical Exam   Constitutional: He is oriented to person, place, and time. He appears well-developed and well-nourished.   HENT:   Head: Normocephalic and atraumatic.   Eyes: Conjunctivae and EOM are normal.   Neck: Normal range of motion. No tracheal deviation present.   Cardiovascular: Normal rate and regular rhythm.   Pulmonary/Chest: Effort normal and breath sounds normal. No respiratory distress.   Abdominal: Soft. Bowel sounds are normal. He exhibits no distension and no mass. There is no tenderness. There is no rebound and no guarding.   Musculoskeletal: Normal range of motion.   Neurological: He is alert and oriented to person, place, and time.   Skin: Skin is warm and dry.   Psychiatric: He has a normal mood and affect. Judgment normal.   Nursing note and vitals reviewed.      No images are attached to the encounter.    Problem list    Heartburn  GERD not controlled with twice a day PPI  Cough associated with heartburn  Colon polyps      Assessment/Plan    We will move the EGD and colonoscopy  If there is none anatomical cause for his worsening heartburn I would likely switch him to as omeprazole 40 mg by mouth twice a day  I am going to add Zantac 150 mg at bedtime  GERD lifestyle modifications discussed in great detail  Further recommendations after EGD and colonoscopy are complete

## 2019-02-06 ENCOUNTER — TELEPHONE (OUTPATIENT)
Dept: CARDIOLOGY | Facility: CLINIC | Age: 69
End: 2019-02-06

## 2019-02-06 ENCOUNTER — TELEPHONE (OUTPATIENT)
Dept: GASTROENTEROLOGY | Facility: CLINIC | Age: 69
End: 2019-02-06

## 2019-02-06 ENCOUNTER — OUTSIDE FACILITY SERVICE (OUTPATIENT)
Dept: GASTROENTEROLOGY | Facility: CLINIC | Age: 69
End: 2019-02-06

## 2019-02-06 PROCEDURE — 43235 EGD DIAGNOSTIC BRUSH WASH: CPT | Performed by: INTERNAL MEDICINE

## 2019-02-06 RX ORDER — OMEPRAZOLE 40 MG/1
40 CAPSULE, DELAYED RELEASE ORAL 2 TIMES DAILY
Qty: 60 CAPSULE | Refills: 2 | Status: SHIPPED | OUTPATIENT
Start: 2019-02-06 | End: 2019-05-03 | Stop reason: SDUPTHER

## 2019-02-06 NOTE — TELEPHONE ENCOUNTER
----- Message from Vale Zeng RN sent at 2/6/2019  3:17 PM EST -----  Regarding: FW: RX      ----- Message -----  From: Bean Borja MD  Sent: 2/6/2019  12:12 PM  To: [facility] 2 Pool  Subject: RX                                               Call in omeprazole 40mg po BID #[id number] rf

## 2019-02-06 NOTE — TELEPHONE ENCOUNTER
Called Dr An's office and spoke with the scheduling department. Dr An booked out quite far, but they can get pt in to see Dr Ghotra on Monday 2/11 at 0800. If pt wishes to see Dr An in future, can discuss with Dr Ghotra on Monday and make appt to f/u with Dr An after that.       Called pt and left a message for call back.

## 2019-02-06 NOTE — TELEPHONE ENCOUNTER
----- Message from Loc An MD sent at 2/6/2019  2:00 PM EST -----  Regarding: RE: OV with Dr An  Can you see if we can move this maribel up with either me or mark in the next week or 2  ----- Message -----  From: Bean Borja MD  Sent: 2/6/2019  12:07 PM  To: Loc An MD, #  Subject: OV with Dr An                                Patient of Dr An's 10 yrs ago. H/o CAD    Patient with HR at 45 in preop with ?? First degree AV block, recent dizziness and presyncope at home. During EGD, further bradycardia into low 30s, unresponsive to atropine, procedure aborted, colonoscopy canceled    Patient due to see Dr An March 15, can you ask Dr lyon  to get this gentleman in sooner? Possibly next week with the MD or NP?    Thanks Yaquelin ROSAS

## 2019-02-06 NOTE — TELEPHONE ENCOUNTER
Pt returned call. Advised that Dr An was booked out quite far, so he has an appt with on of his associates. Pt was OK with seeing another MD. Advised that his appt is Monday 2/11/19 at 0800 with Dr Yohannes Ghotra. Gave pt address and phone number to MD office. Pt verb understanding.

## 2019-02-06 NOTE — TELEPHONE ENCOUNTER
Elsa can you call this patient   You can add him on at  9;20 on 2/13/19 with Dr. An  Or if Lina has anything next week you can add on with her.

## 2019-02-06 NOTE — TELEPHONE ENCOUNTER
----- Message from Bean Borja MD sent at 2/6/2019 12:07 PM EST -----  Regarding: OV with Dr An  Patient of Dr An's 10 yrs ago. H/o CAD    Patient with HR at 45 in preop with ?? First degree AV block, recent dizziness and presyncope at home. During EGD, further bradycardia into low 30s, unresponsive to atropine, procedure aborted, colonoscopy canceled    Patient due to see Dr An March 15, can you ask Dr lyon  to get this gentleman in sooner? Possibly next week with the MD or NP?    Thanks Yaquelin ROSAS

## 2019-02-13 ENCOUNTER — HOSPITAL ENCOUNTER (OUTPATIENT)
Dept: CARDIOLOGY | Facility: HOSPITAL | Age: 69
Discharge: HOME | End: 2019-02-13
Admitting: INTERNAL MEDICINE

## 2019-02-13 ENCOUNTER — TELEPHONE (OUTPATIENT)
Dept: CARDIOLOGY | Facility: CLINIC | Age: 69
End: 2019-02-13

## 2019-02-13 ENCOUNTER — OFFICE VISIT (OUTPATIENT)
Dept: CARDIOLOGY | Facility: CLINIC | Age: 69
End: 2019-02-13

## 2019-02-13 ENCOUNTER — APPOINTMENT (OUTPATIENT)
Dept: LAB | Facility: HOSPITAL | Age: 69
End: 2019-02-13

## 2019-02-13 VITALS
WEIGHT: 224.8 LBS | SYSTOLIC BLOOD PRESSURE: 140 MMHG | HEIGHT: 69 IN | BODY MASS INDEX: 33.3 KG/M2 | DIASTOLIC BLOOD PRESSURE: 74 MMHG | HEART RATE: 38 BPM

## 2019-02-13 DIAGNOSIS — R06.09 DYSPNEA ON EXERTION: ICD-10-CM

## 2019-02-13 DIAGNOSIS — I45.10 RBBB: Primary | ICD-10-CM

## 2019-02-13 DIAGNOSIS — R53.83 OTHER FATIGUE: ICD-10-CM

## 2019-02-13 DIAGNOSIS — I44.1 SECOND DEGREE HEART BLOCK: ICD-10-CM

## 2019-02-13 LAB
ALBUMIN SERPL-MCNC: 4.2 G/DL (ref 3.5–5.2)
ALBUMIN/GLOB SERPL: 1.5 G/DL
ALP SERPL-CCNC: 79 U/L (ref 39–117)
ALT SERPL W P-5'-P-CCNC: 17 U/L (ref 1–41)
ANION GAP SERPL CALCULATED.3IONS-SCNC: 11.7 MMOL/L
ASCENDING AORTA: 3.7 CM
AST SERPL-CCNC: 18 U/L (ref 1–40)
BASOPHILS # BLD AUTO: 0.04 10*3/MM3 (ref 0–0.2)
BASOPHILS NFR BLD AUTO: 0.5 % (ref 0–1.5)
BH CV ECHO MEAS - ACS: 1.7 CM
BH CV ECHO MEAS - AO MAX PG (FULL): 32.6 MMHG
BH CV ECHO MEAS - AO MAX PG: 37.5 MMHG
BH CV ECHO MEAS - AO MEAN PG (FULL): 18.9 MMHG
BH CV ECHO MEAS - AO MEAN PG: 21.9 MMHG
BH CV ECHO MEAS - AO ROOT AREA (BSA CORRECTED): 1.8
BH CV ECHO MEAS - AO ROOT AREA: 12.3 CM^2
BH CV ECHO MEAS - AO ROOT DIAM: 4 CM
BH CV ECHO MEAS - AO V2 MAX: 306.2 CM/SEC
BH CV ECHO MEAS - AO V2 MEAN: 219.8 CM/SEC
BH CV ECHO MEAS - AO V2 VTI: 82.1 CM
BH CV ECHO MEAS - AVA(I,A): 1.5 CM^2
BH CV ECHO MEAS - AVA(I,D): 1.5 CM^2
BH CV ECHO MEAS - AVA(V,A): 1.5 CM^2
BH CV ECHO MEAS - AVA(V,D): 1.5 CM^2
BH CV ECHO MEAS - BSA(HAYCOCK): 2.3 M^2
BH CV ECHO MEAS - BSA: 2.2 M^2
BH CV ECHO MEAS - BZI_BMI: 33.1 KILOGRAMS/M^2
BH CV ECHO MEAS - BZI_METRIC_HEIGHT: 175.3 CM
BH CV ECHO MEAS - BZI_METRIC_WEIGHT: 101.6 KG
BH CV ECHO MEAS - EDV(MOD-SP2): 81 ML
BH CV ECHO MEAS - EDV(MOD-SP4): 95 ML
BH CV ECHO MEAS - EDV(TEICH): 213.9 ML
BH CV ECHO MEAS - EF(CUBED): 81.4 %
BH CV ECHO MEAS - EF(MOD-BP): 63 %
BH CV ECHO MEAS - EF(MOD-SP2): 61.7 %
BH CV ECHO MEAS - EF(MOD-SP4): 63.2 %
BH CV ECHO MEAS - EF(TEICH): 73 %
BH CV ECHO MEAS - ESV(MOD-SP2): 31 ML
BH CV ECHO MEAS - ESV(MOD-SP4): 35 ML
BH CV ECHO MEAS - ESV(TEICH): 57.8 ML
BH CV ECHO MEAS - FS: 43 %
BH CV ECHO MEAS - IVS/LVPW: 1
BH CV ECHO MEAS - IVSD: 1.1 CM
BH CV ECHO MEAS - LAT PEAK E' VEL: 11 CM/SEC
BH CV ECHO MEAS - LV DIASTOLIC VOL/BSA (35-75): 43.8 ML/M^2
BH CV ECHO MEAS - LV MASS(C)D: 312.3 GRAMS
BH CV ECHO MEAS - LV MASS(C)DI: 144.1 GRAMS/M^2
BH CV ECHO MEAS - LV MAX PG: 4.9 MMHG
BH CV ECHO MEAS - LV MEAN PG: 3 MMHG
BH CV ECHO MEAS - LV SYSTOLIC VOL/BSA (12-30): 16.1 ML/M^2
BH CV ECHO MEAS - LV V1 MAX: 110.6 CM/SEC
BH CV ECHO MEAS - LV V1 MEAN: 82.4 CM/SEC
BH CV ECHO MEAS - LV V1 VTI: 29.3 CM
BH CV ECHO MEAS - LVIDD: 6.5 CM
BH CV ECHO MEAS - LVIDS: 3.7 CM
BH CV ECHO MEAS - LVLD AP2: 6.8 CM
BH CV ECHO MEAS - LVLD AP4: 7.4 CM
BH CV ECHO MEAS - LVLS AP2: 6.1 CM
BH CV ECHO MEAS - LVLS AP4: 6.5 CM
BH CV ECHO MEAS - LVOT AREA (M): 4.2 CM^2
BH CV ECHO MEAS - LVOT AREA: 4.2 CM^2
BH CV ECHO MEAS - LVOT DIAM: 2.3 CM
BH CV ECHO MEAS - LVPWD: 1.1 CM
BH CV ECHO MEAS - MED PEAK E' VEL: 12 CM/SEC
BH CV ECHO MEAS - MV A DUR: 0.13 SEC
BH CV ECHO MEAS - MV A MAX VEL: 63.7 CM/SEC
BH CV ECHO MEAS - MV DEC SLOPE: 798.8 CM/SEC^2
BH CV ECHO MEAS - MV DEC TIME: 0.14 SEC
BH CV ECHO MEAS - MV E MAX VEL: 114 CM/SEC
BH CV ECHO MEAS - MV E/A: 1.8
BH CV ECHO MEAS - MV MAX PG: 6.2 MMHG
BH CV ECHO MEAS - MV MEAN PG: 1.8 MMHG
BH CV ECHO MEAS - MV P1/2T MAX VEL: 115.1 CM/SEC
BH CV ECHO MEAS - MV P1/2T: 42.2 MSEC
BH CV ECHO MEAS - MV V2 MAX: 124.9 CM/SEC
BH CV ECHO MEAS - MV V2 MEAN: 56 CM/SEC
BH CV ECHO MEAS - MV V2 VTI: 64.1 CM
BH CV ECHO MEAS - MVA P1/2T LCG: 1.9 CM^2
BH CV ECHO MEAS - MVA(P1/2T): 5.2 CM^2
BH CV ECHO MEAS - MVA(VTI): 1.9 CM^2
BH CV ECHO MEAS - PA MAX PG (FULL): 3.8 MMHG
BH CV ECHO MEAS - PA MAX PG: 5.8 MMHG
BH CV ECHO MEAS - PA V2 MAX: 120.8 CM/SEC
BH CV ECHO MEAS - PULM A REVS DUR: 0.1 SEC
BH CV ECHO MEAS - PULM A REVS VEL: 24.8 CM/SEC
BH CV ECHO MEAS - PULM DIAS VEL: 51.4 CM/SEC
BH CV ECHO MEAS - PULM S/D: 0.8
BH CV ECHO MEAS - PULM SYS VEL: 41 CM/SEC
BH CV ECHO MEAS - PVA(V,A): 3.4 CM^2
BH CV ECHO MEAS - PVA(V,D): 3.4 CM^2
BH CV ECHO MEAS - QP/QS: 0.91
BH CV ECHO MEAS - RV MAX PG: 2.1 MMHG
BH CV ECHO MEAS - RV MEAN PG: 1.3 MMHG
BH CV ECHO MEAS - RV V1 MAX: 71.8 CM/SEC
BH CV ECHO MEAS - RV V1 MEAN: 52.8 CM/SEC
BH CV ECHO MEAS - RV V1 VTI: 19.5 CM
BH CV ECHO MEAS - RVOT AREA: 5.8 CM^2
BH CV ECHO MEAS - RVOT DIAM: 2.7 CM
BH CV ECHO MEAS - SI(AO): 466.2 ML/M^2
BH CV ECHO MEAS - SI(CUBED): 101.8 ML/M^2
BH CV ECHO MEAS - SI(LVOT): 57.1 ML/M^2
BH CV ECHO MEAS - SI(MOD-SP2): 23.1 ML/M^2
BH CV ECHO MEAS - SI(MOD-SP4): 27.7 ML/M^2
BH CV ECHO MEAS - SI(TEICH): 72 ML/M^2
BH CV ECHO MEAS - SUP REN AO DIAM: 1.8 CM
BH CV ECHO MEAS - SV(AO): 1011 ML
BH CV ECHO MEAS - SV(CUBED): 220.7 ML
BH CV ECHO MEAS - SV(LVOT): 123.7 ML
BH CV ECHO MEAS - SV(MOD-SP2): 50 ML
BH CV ECHO MEAS - SV(MOD-SP4): 60 ML
BH CV ECHO MEAS - SV(RVOT): 112.7 ML
BH CV ECHO MEAS - SV(TEICH): 156 ML
BH CV ECHO MEASUREMENTS AVERAGE E/E' RATIO: 9.91
BILIRUB SERPL-MCNC: 0.7 MG/DL (ref 0.1–1.2)
BUN BLD-MCNC: 9 MG/DL (ref 8–23)
BUN/CREAT SERPL: 9.7 (ref 7–25)
CALCIUM SPEC-SCNC: 9.6 MG/DL (ref 8.6–10.5)
CHLORIDE SERPL-SCNC: 99 MMOL/L (ref 98–107)
CO2 SERPL-SCNC: 32.3 MMOL/L (ref 22–29)
CREAT BLD-MCNC: 0.93 MG/DL (ref 0.76–1.27)
DEPRECATED RDW RBC AUTO: 40.8 FL (ref 37–54)
EOSINOPHIL # BLD AUTO: 0.14 10*3/MM3 (ref 0–0.4)
EOSINOPHIL NFR BLD AUTO: 1.8 % (ref 0.3–6.2)
ERYTHROCYTE [DISTWIDTH] IN BLOOD BY AUTOMATED COUNT: 13.5 % (ref 12.3–15.4)
GFR SERPL CREATININE-BSD FRML MDRD: 80 ML/MIN/1.73
GLOBULIN UR ELPH-MCNC: 2.8 GM/DL
GLUCOSE BLD-MCNC: 174 MG/DL (ref 65–99)
HCT VFR BLD AUTO: 50.1 % (ref 37.5–51)
HGB BLD-MCNC: 16.5 G/DL (ref 13–17.7)
IMM GRANULOCYTES # BLD AUTO: 0.12 10*3/MM3 (ref 0–0.05)
IMM GRANULOCYTES NFR BLD AUTO: 1.5 % (ref 0–0.5)
LEFT ATRIUM VOLUME INDEX: 24 ML/M2
LYMPHOCYTES # BLD AUTO: 1.26 10*3/MM3 (ref 0.7–3.1)
LYMPHOCYTES NFR BLD AUTO: 16.3 % (ref 19.6–45.3)
MAXIMAL PREDICTED HEART RATE: 150 BPM
MCH RBC QN AUTO: 27.7 PG (ref 26.6–33)
MCHC RBC AUTO-ENTMCNC: 32.9 G/DL (ref 31.5–35.7)
MCV RBC AUTO: 84.1 FL (ref 79–97)
MONOCYTES # BLD AUTO: 0.62 10*3/MM3 (ref 0.1–0.9)
MONOCYTES NFR BLD AUTO: 8 % (ref 5–12)
NEUTROPHILS # BLD AUTO: 5.57 10*3/MM3 (ref 1.4–7)
NEUTROPHILS NFR BLD AUTO: 71.9 % (ref 42.7–76)
NRBC BLD AUTO-RTO: 0 /100 WBC (ref 0–0)
NT-PROBNP SERPL-MCNC: 159.1 PG/ML (ref 5–900)
PLATELET # BLD AUTO: 268 10*3/MM3 (ref 140–450)
PMV BLD AUTO: 9.8 FL (ref 6–12)
POTASSIUM BLD-SCNC: 3.5 MMOL/L (ref 3.5–5.2)
PROT SERPL-MCNC: 7 G/DL (ref 6–8.5)
RBC # BLD AUTO: 5.96 10*6/MM3 (ref 4.14–5.8)
SINUS: 3.3 CM
SODIUM BLD-SCNC: 143 MMOL/L (ref 136–145)
STJ: 3.6 CM
STRESS TARGET HR: 128 BPM
TSH SERPL DL<=0.05 MIU/L-ACNC: 1.14 MIU/ML (ref 0.27–4.2)
WBC NRBC COR # BLD: 7.75 10*3/MM3 (ref 3.4–10.8)

## 2019-02-13 PROCEDURE — 25010000002 PERFLUTREN (DEFINITY) 8.476 MG IN SODIUM CHLORIDE 0.9 % 10 ML INJECTION: Performed by: INTERNAL MEDICINE

## 2019-02-13 PROCEDURE — 93306 TTE W/DOPPLER COMPLETE: CPT

## 2019-02-13 PROCEDURE — 93306 TTE W/DOPPLER COMPLETE: CPT | Performed by: INTERNAL MEDICINE

## 2019-02-13 PROCEDURE — 36415 COLL VENOUS BLD VENIPUNCTURE: CPT | Performed by: INTERNAL MEDICINE

## 2019-02-13 PROCEDURE — 85025 COMPLETE CBC W/AUTO DIFF WBC: CPT | Performed by: INTERNAL MEDICINE

## 2019-02-13 PROCEDURE — 80053 COMPREHEN METABOLIC PANEL: CPT | Performed by: INTERNAL MEDICINE

## 2019-02-13 PROCEDURE — 99205 OFFICE O/P NEW HI 60 MIN: CPT | Performed by: INTERNAL MEDICINE

## 2019-02-13 PROCEDURE — 83880 ASSAY OF NATRIURETIC PEPTIDE: CPT | Performed by: INTERNAL MEDICINE

## 2019-02-13 PROCEDURE — 93000 ELECTROCARDIOGRAM COMPLETE: CPT | Performed by: INTERNAL MEDICINE

## 2019-02-13 PROCEDURE — 84443 ASSAY THYROID STIM HORMONE: CPT | Performed by: INTERNAL MEDICINE

## 2019-02-13 RX ADMIN — PERFLUTREN 1.5 ML: 6.52 INJECTION, SUSPENSION INTRAVENOUS at 11:27

## 2019-02-13 NOTE — TELEPHONE ENCOUNTER
Dr Yonatan An would like for  [employer] to see you on Friday for a PPM implant.  What company would you like to use?    Thank you  Kristi LYNNE

## 2019-02-13 NOTE — H&P (VIEW-ONLY)
Date of Office Visit: 2019  Encounter Provider: Loc An MD  Place of Service: [facility] CARDIOLOGY  Patient Name: Alfredo Toure  :1950  [id number]    Chief Complaint   Patient presents with   • Slow Heart Rate   :     HPI: Alfredo Toure is a 68 y.o. male  He is a gentleman that I had seen in the remote past.  He had gone to get a colonoscopy and they noticed his heart rate was 30.  He has had fatigue for the last year, some dyspnea on exertion for the last year, no syncope, no chest pain, no PND or orthopnea.  He does have a little bit of edema.  He has had a chronic cough of unclear etiology.        Past Medical History:   Diagnosis Date   • Colon polyp    • Diabetes mellitus (CMS/HCC)    • GERD (gastroesophageal reflux disease)        Past Surgical History:   Procedure Laterality Date   • ABDOMINAL SURGERY      at [facility] [place]   • CHOLECYSTECTOMY     • COLONOSCOPY         Social History     Socioeconomic History   • Marital status:      Spouse name: Not on file   • Number of children: Not on file   • Years of education: Not on file   • Highest education level: Not on file   Social Needs   • Financial resource strain: Not on file   • Food insecurity - worry: Not on file   • Food insecurity - inability: Not on file   • Transportation needs - medical: Not on file   • Transportation needs - non-medical: Not on file   Occupational History   • Not on file   Tobacco Use   • Smoking status: Never Smoker   Substance and Sexual Activity   • Alcohol use: Yes     Comment: on occasion - monthly.   alcohol is not the problem   • Drug use: No   • Sexual activity: No   Other Topics Concern   • Not on file   Social History Narrative   • Not on file       Family History   Problem Relation Age of Onset   • Colon cancer Maternal Uncle        Review of Systems   Constitution: Negative for decreased appetite, fever, malaise/fatigue and weight loss.   HENT:  "Negative for nosebleeds.    Eyes: Negative for double vision.   Cardiovascular: Negative for chest pain, claudication, cyanosis, dyspnea on exertion, irregular heartbeat, leg swelling, near-syncope, orthopnea, palpitations, paroxysmal nocturnal dyspnea and syncope.   Respiratory: Negative for cough, hemoptysis and shortness of breath.    Hematologic/Lymphatic: Negative for bleeding problem.   Skin: Negative for rash.   Musculoskeletal: Negative for falls and myalgias.   Gastrointestinal: Negative for hematochezia, jaundice, melena, nausea and vomiting.   Genitourinary: Negative for hematuria.   Neurological: Negative for dizziness and seizures.   Psychiatric/Behavioral: Negative for altered mental status and memory loss.       No Known Allergies      Current Outpatient Medications:   •  amLODIPine (NORVASC) 5 MG tablet, Take 5 mg by mouth Daily., Disp: , Rfl: 3  •  aspirin 81 MG oral suspension, , Disp: , Rfl:   •  FARXIGA 10 MG tablet, Take 1 tablet by mouth Daily., Disp: , Rfl: 3  •  metFORMIN (GLUCOPHAGE) 1000 MG tablet, Take 1,000 mg by mouth 2 (Two) Times a Day., Disp: , Rfl: 3  •  omeprazole (PRILOSEC) 40 MG capsule, Take 1 capsule by mouth 2 (Two) Times a Day., Disp: 60 capsule, Rfl: 2  •  simvastatin (ZOCOR) 20 MG tablet, Take 20 mg by mouth every night at bedtime., Disp: , Rfl: 3  •  valsartan-hydrochlorothiazide (DIOVAN-HCT) 160-25 MG per tablet, Take 1 tablet by mouth Daily., Disp: , Rfl: 3      Objective:     Vitals:    02/13/19 0919   BP: 140/74   Pulse: (!) 38   Weight: 102 kg (224 lb 12.8 oz)   Height: 175.3 cm (69\")     Body mass index is 33.2 kg/m².    Physical Exam   Constitutional: He is oriented to person, place, and time. He appears well-developed and well-nourished.   HENT:   Head: Normocephalic.   Eyes: No scleral icterus.   Neck: No JVD present. No thyromegaly present.   Cardiovascular: Normal rate and regular rhythm. Exam reveals no gallop and no friction rub.   Murmur heard.   Harsh " crescendo-decrescendo early systolic murmur is present with a grade of 2/6 at the upper right sternal border radiating to the neck.  Pulmonary/Chest: Effort normal and breath sounds normal. He has no wheezes. He has no rales.   Abdominal: Soft. There is no hepatosplenomegaly. There is no tenderness.   Musculoskeletal: Normal range of motion. He exhibits no edema.   Lymphadenopathy:     He has no cervical adenopathy.   Neurological: He is alert and oriented to person, place, and time.   Skin: Skin is warm and dry. No rash noted.   Psychiatric: He has a normal mood and affect.         ECG 12 Lead  Date/Time: 2/13/2019 9:49 AM  Performed by: Loc An MD  Authorized by: Loc An MD   Conduction: right bundle branch block  Comments: New 2-1 heart block             Assessment:       Diagnosis Plan   1. RBBB  Comprehensive Metabolic Panel    CBC & Differential    proBNP    TSH    Adult Transthoracic Echo Complete W/ Cont if Necessary Per Protocol   2. Dyspnea on exertion  Comprehensive Metabolic Panel    CBC & Differential    proBNP    TSH    Adult Transthoracic Echo Complete W/ Cont if Necessary Per Protocol   3. Second degree heart block  Comprehensive Metabolic Panel    CBC & Differential    proBNP    TSH    Adult Transthoracic Echo Complete W/ Cont if Necessary Per Protocol   4. Other fatigue  Comprehensive Metabolic Panel    CBC & Differential    proBNP    TSH    Adult Transthoracic Echo Complete W/ Cont if Necessary Per Protocol          Plan:       This is a gentleman who comes in with new 2:1 heart block, symptomatic from it.  He has underlying right bundle branch block.  He clearly meets indications to have a permanent pacemaker implanted.  I am going to check an echo on him to see what his LV function is like and I am also going to get some blood work on him.  I would like him to get a pacemaker this week with Dr. Temple.  I have discussed this with the patient at length and they understand the  risks versus benefits of this approach.        As always, it has been a pleasure to participate in your patient's care.      Sincerely,       Loc An MD

## 2019-02-13 NOTE — PROGRESS NOTES
Date of Office Visit: 2019  Encounter Provider: Loc An MD  Place of Service: [facility] CARDIOLOGY  Patient Name: Alfredo Toure  :1950  [id number]    Chief Complaint   Patient presents with   • Slow Heart Rate   :     HPI: Alfredo Toure is a 68 y.o. male  He is a gentleman that I had seen in the remote past.  He had gone to get a colonoscopy and they noticed his heart rate was 30.  He has had fatigue for the last year, some dyspnea on exertion for the last year, no syncope, no chest pain, no PND or orthopnea.  He does have a little bit of edema.  He has had a chronic cough of unclear etiology.        Past Medical History:   Diagnosis Date   • Colon polyp    • Diabetes mellitus (CMS/HCC)    • GERD (gastroesophageal reflux disease)        Past Surgical History:   Procedure Laterality Date   • ABDOMINAL SURGERY      at [facility] [place]   • CHOLECYSTECTOMY     • COLONOSCOPY         Social History     Socioeconomic History   • Marital status:      Spouse name: Not on file   • Number of children: Not on file   • Years of education: Not on file   • Highest education level: Not on file   Social Needs   • Financial resource strain: Not on file   • Food insecurity - worry: Not on file   • Food insecurity - inability: Not on file   • Transportation needs - medical: Not on file   • Transportation needs - non-medical: Not on file   Occupational History   • Not on file   Tobacco Use   • Smoking status: Never Smoker   Substance and Sexual Activity   • Alcohol use: Yes     Comment: on occasion - monthly.   alcohol is not the problem   • Drug use: No   • Sexual activity: No   Other Topics Concern   • Not on file   Social History Narrative   • Not on file       Family History   Problem Relation Age of Onset   • Colon cancer Maternal Uncle        Review of Systems   Constitution: Negative for decreased appetite, fever, malaise/fatigue and weight loss.   HENT:  "Negative for nosebleeds.    Eyes: Negative for double vision.   Cardiovascular: Negative for chest pain, claudication, cyanosis, dyspnea on exertion, irregular heartbeat, leg swelling, near-syncope, orthopnea, palpitations, paroxysmal nocturnal dyspnea and syncope.   Respiratory: Negative for cough, hemoptysis and shortness of breath.    Hematologic/Lymphatic: Negative for bleeding problem.   Skin: Negative for rash.   Musculoskeletal: Negative for falls and myalgias.   Gastrointestinal: Negative for hematochezia, jaundice, melena, nausea and vomiting.   Genitourinary: Negative for hematuria.   Neurological: Negative for dizziness and seizures.   Psychiatric/Behavioral: Negative for altered mental status and memory loss.       No Known Allergies      Current Outpatient Medications:   •  amLODIPine (NORVASC) 5 MG tablet, Take 5 mg by mouth Daily., Disp: , Rfl: 3  •  aspirin 81 MG oral suspension, , Disp: , Rfl:   •  FARXIGA 10 MG tablet, Take 1 tablet by mouth Daily., Disp: , Rfl: 3  •  metFORMIN (GLUCOPHAGE) 1000 MG tablet, Take 1,000 mg by mouth 2 (Two) Times a Day., Disp: , Rfl: 3  •  omeprazole (PRILOSEC) 40 MG capsule, Take 1 capsule by mouth 2 (Two) Times a Day., Disp: 60 capsule, Rfl: 2  •  simvastatin (ZOCOR) 20 MG tablet, Take 20 mg by mouth every night at bedtime., Disp: , Rfl: 3  •  valsartan-hydrochlorothiazide (DIOVAN-HCT) 160-25 MG per tablet, Take 1 tablet by mouth Daily., Disp: , Rfl: 3      Objective:     Vitals:    02/13/19 0919   BP: 140/74   Pulse: (!) 38   Weight: 102 kg (224 lb 12.8 oz)   Height: 175.3 cm (69\")     Body mass index is 33.2 kg/m².    Physical Exam   Constitutional: He is oriented to person, place, and time. He appears well-developed and well-nourished.   HENT:   Head: Normocephalic.   Eyes: No scleral icterus.   Neck: No JVD present. No thyromegaly present.   Cardiovascular: Normal rate and regular rhythm. Exam reveals no gallop and no friction rub.   Murmur heard.   Harsh " crescendo-decrescendo early systolic murmur is present with a grade of 2/6 at the upper right sternal border radiating to the neck.  Pulmonary/Chest: Effort normal and breath sounds normal. He has no wheezes. He has no rales.   Abdominal: Soft. There is no hepatosplenomegaly. There is no tenderness.   Musculoskeletal: Normal range of motion. He exhibits no edema.   Lymphadenopathy:     He has no cervical adenopathy.   Neurological: He is alert and oriented to person, place, and time.   Skin: Skin is warm and dry. No rash noted.   Psychiatric: He has a normal mood and affect.         ECG 12 Lead  Date/Time: 2/13/2019 9:49 AM  Performed by: Loc An MD  Authorized by: Loc An MD   Conduction: right bundle branch block  Comments: New 2-1 heart block             Assessment:       Diagnosis Plan   1. RBBB  Comprehensive Metabolic Panel    CBC & Differential    proBNP    TSH    Adult Transthoracic Echo Complete W/ Cont if Necessary Per Protocol   2. Dyspnea on exertion  Comprehensive Metabolic Panel    CBC & Differential    proBNP    TSH    Adult Transthoracic Echo Complete W/ Cont if Necessary Per Protocol   3. Second degree heart block  Comprehensive Metabolic Panel    CBC & Differential    proBNP    TSH    Adult Transthoracic Echo Complete W/ Cont if Necessary Per Protocol   4. Other fatigue  Comprehensive Metabolic Panel    CBC & Differential    proBNP    TSH    Adult Transthoracic Echo Complete W/ Cont if Necessary Per Protocol          Plan:       This is a gentleman who comes in with new 2:1 heart block, symptomatic from it.  He has underlying right bundle branch block.  He clearly meets indications to have a permanent pacemaker implanted.  I am going to check an echo on him to see what his LV function is like and I am also going to get some blood work on him.  I would like him to get a pacemaker this week with Dr. Temple.  I have discussed this with the patient at length and they understand the  risks versus benefits of this approach.        As always, it has been a pleasure to participate in your patient's care.      Sincerely,       Loc An MD

## 2019-02-15 ENCOUNTER — HOSPITAL ENCOUNTER (OUTPATIENT)
Facility: HOSPITAL | Age: 69
Setting detail: HOSPITAL OUTPATIENT SURGERY
Discharge: HOME | End: 2019-02-15
Attending: INTERNAL MEDICINE | Admitting: INTERNAL MEDICINE

## 2019-02-15 ENCOUNTER — TELEPHONE (OUTPATIENT)
Dept: GASTROENTEROLOGY | Facility: CLINIC | Age: 69
End: 2019-02-15

## 2019-02-15 ENCOUNTER — APPOINTMENT (OUTPATIENT)
Dept: GENERAL RADIOLOGY | Facility: HOSPITAL | Age: 69
End: 2019-02-15

## 2019-02-15 VITALS
OXYGEN SATURATION: 94 % | TEMPERATURE: 98.2 F | RESPIRATION RATE: 16 BRPM | SYSTOLIC BLOOD PRESSURE: 136 MMHG | HEIGHT: 69 IN | BODY MASS INDEX: 33.18 KG/M2 | WEIGHT: 224 LBS | HEART RATE: 66 BPM | DIASTOLIC BLOOD PRESSURE: 83 MMHG

## 2019-02-15 DIAGNOSIS — R53.83 OTHER FATIGUE: ICD-10-CM

## 2019-02-15 DIAGNOSIS — I45.10 RBBB: ICD-10-CM

## 2019-02-15 DIAGNOSIS — I44.1 SECOND DEGREE HEART BLOCK: ICD-10-CM

## 2019-02-15 DIAGNOSIS — R06.09 DYSPNEA ON EXERTION: ICD-10-CM

## 2019-02-15 LAB — GLUCOSE BLDC GLUCOMTR-MCNC: 141 MG/DL (ref 70–130)

## 2019-02-15 PROCEDURE — 25010000002 MIDAZOLAM PER 1 MG: Performed by: INTERNAL MEDICINE

## 2019-02-15 PROCEDURE — 33208 INSRT HEART PM ATRIAL & VENT: CPT | Performed by: INTERNAL MEDICINE

## 2019-02-15 PROCEDURE — 25010000002 FENTANYL CITRATE (PF) 100 MCG/2ML SOLUTION: Performed by: INTERNAL MEDICINE

## 2019-02-15 PROCEDURE — 0 IOPAMIDOL PER 1 ML: Performed by: INTERNAL MEDICINE

## 2019-02-15 PROCEDURE — 93010 ELECTROCARDIOGRAM REPORT: CPT | Performed by: INTERNAL MEDICINE

## 2019-02-15 PROCEDURE — C1898 LEAD, PMKR, OTHER THAN TRANS: HCPCS | Performed by: INTERNAL MEDICINE

## 2019-02-15 PROCEDURE — C1894 INTRO/SHEATH, NON-LASER: HCPCS | Performed by: INTERNAL MEDICINE

## 2019-02-15 PROCEDURE — 71045 X-RAY EXAM CHEST 1 VIEW: CPT

## 2019-02-15 PROCEDURE — 82962 GLUCOSE BLOOD TEST: CPT

## 2019-02-15 PROCEDURE — C1785 PMKR, DUAL, RATE-RESP: HCPCS | Performed by: INTERNAL MEDICINE

## 2019-02-15 PROCEDURE — 25010000003 CEFAZOLIN 1-4 GM/50ML-% SOLUTION: Performed by: INTERNAL MEDICINE

## 2019-02-15 PROCEDURE — 93005 ELECTROCARDIOGRAM TRACING: CPT | Performed by: INTERNAL MEDICINE

## 2019-02-15 PROCEDURE — C1887 CATHETER, GUIDING: HCPCS | Performed by: INTERNAL MEDICINE

## 2019-02-15 DEVICE — IMPLANTABLE DEVICE: Type: IMPLANTABLE DEVICE | Status: FUNCTIONAL

## 2019-02-15 DEVICE — GEN PM ADVISA SURESCAN DR MRI: Type: IMPLANTABLE DEVICE | Status: FUNCTIONAL

## 2019-02-15 DEVICE — LD PM CAPSUREFIX NOVUS5076 52CM: Type: IMPLANTABLE DEVICE | Status: FUNCTIONAL

## 2019-02-15 RX ORDER — ACETAMINOPHEN 325 MG/1
650 TABLET ORAL EVERY 4 HOURS PRN
Status: CANCELLED | OUTPATIENT
Start: 2019-02-15

## 2019-02-15 RX ORDER — SODIUM CHLORIDE 0.9 % (FLUSH) 0.9 %
3 SYRINGE (ML) INJECTION EVERY 12 HOURS SCHEDULED
Status: DISCONTINUED | OUTPATIENT
Start: 2019-02-15 | End: 2019-02-15 | Stop reason: HOSPADM

## 2019-02-15 RX ORDER — FENTANYL CITRATE 50 UG/ML
INJECTION, SOLUTION INTRAMUSCULAR; INTRAVENOUS AS NEEDED
Status: DISCONTINUED | OUTPATIENT
Start: 2019-02-15 | End: 2019-02-15 | Stop reason: HOSPADM

## 2019-02-15 RX ORDER — ACETAMINOPHEN 160 MG/5ML
650 SOLUTION ORAL EVERY 4 HOURS PRN
Status: CANCELLED | OUTPATIENT
Start: 2019-02-15

## 2019-02-15 RX ORDER — CEFAZOLIN SODIUM 1 G/50ML
INJECTION, SOLUTION INTRAVENOUS CONTINUOUS PRN
Status: COMPLETED | OUTPATIENT
Start: 2019-02-15 | End: 2019-02-15

## 2019-02-15 RX ORDER — ACETAMINOPHEN 650 MG/1
650 SUPPOSITORY RECTAL EVERY 4 HOURS PRN
Status: CANCELLED | OUTPATIENT
Start: 2019-02-15

## 2019-02-15 RX ORDER — SODIUM CHLORIDE 9 MG/ML
75 INJECTION, SOLUTION INTRAVENOUS CONTINUOUS
Status: DISCONTINUED | OUTPATIENT
Start: 2019-02-15 | End: 2019-02-15 | Stop reason: HOSPADM

## 2019-02-15 RX ORDER — MIDAZOLAM HYDROCHLORIDE 1 MG/ML
INJECTION INTRAMUSCULAR; INTRAVENOUS AS NEEDED
Status: DISCONTINUED | OUTPATIENT
Start: 2019-02-15 | End: 2019-02-15 | Stop reason: HOSPADM

## 2019-02-15 RX ORDER — LIDOCAINE HYDROCHLORIDE 10 MG/ML
INJECTION, SOLUTION INFILTRATION; PERINEURAL AS NEEDED
Status: DISCONTINUED | OUTPATIENT
Start: 2019-02-15 | End: 2019-02-15 | Stop reason: HOSPADM

## 2019-02-15 RX ORDER — LIDOCAINE HYDROCHLORIDE 10 MG/ML
0.1 INJECTION, SOLUTION EPIDURAL; INFILTRATION; INTRACAUDAL; PERINEURAL ONCE AS NEEDED
Status: DISCONTINUED | OUTPATIENT
Start: 2019-02-15 | End: 2019-02-15 | Stop reason: HOSPADM

## 2019-02-15 RX ORDER — ASPIRIN 81 MG/1
81 TABLET ORAL DAILY
COMMUNITY

## 2019-02-15 RX ORDER — HYDROCODONE BITARTRATE AND ACETAMINOPHEN 5; 325 MG/1; MG/1
1 TABLET ORAL EVERY 4 HOURS PRN
Status: CANCELLED | OUTPATIENT
Start: 2019-02-15 | End: 2019-02-25

## 2019-02-15 RX ORDER — SODIUM CHLORIDE 0.9 % (FLUSH) 0.9 %
3-10 SYRINGE (ML) INJECTION AS NEEDED
Status: DISCONTINUED | OUTPATIENT
Start: 2019-02-15 | End: 2019-02-15 | Stop reason: HOSPADM

## 2019-02-15 RX ADMIN — SODIUM CHLORIDE 75 ML/HR: 9 INJECTION, SOLUTION INTRAVENOUS at 07:18

## 2019-02-15 NOTE — TELEPHONE ENCOUNTER
----- Message from Bean Borja MD sent at 2/14/2019  1:26 PM EST -----  Please  Office visit with me in 8-12 weeks, please make sure he is going to see his cardiologist soon

## 2019-02-21 NOTE — TELEPHONE ENCOUNTER
Called pt and advised that Dr Borja would like to see him in about 8-12 weeks. Pt verb understanding and states he will see his cardiologist tomorrow to assess his pacemaker and make adjustments. He will call next week to make the appt to see Dr Borja.

## 2019-02-26 ENCOUNTER — RESEARCH ENCOUNTER (OUTPATIENT)
Dept: CARDIOLOGY | Facility: CLINIC | Age: 69
End: 2019-02-26

## 2019-02-26 ENCOUNTER — CLINICAL SUPPORT NO REQUIREMENTS (OUTPATIENT)
Dept: CARDIOLOGY | Facility: CLINIC | Age: 69
End: 2019-02-26

## 2019-02-26 DIAGNOSIS — I44.1 SECOND DEGREE HEART BLOCK: Primary | ICD-10-CM

## 2019-02-26 PROCEDURE — 93280 PM DEVICE PROGR EVAL DUAL: CPT | Performed by: INTERNAL MEDICINE

## 2019-02-27 NOTE — RESEARCH
[facility]  [address]  [place] [address]  [phone]    Research Note:   PSR Registry     Patient Information:  Alfredo Toure    Study ID#: [id number]  Subject Initials: DINO  Qualifying Lead:  [id number]     I met with  DINO  today at the time of his in-office visit and wound check, to discuss possible participation in the [employer] PSR Registry.  DINO  received a dual chamber Medtronic pacemaker with a qualifying [employer]onic 3830 SelectSecure RV HIS lead on 15/Feb/19.  DINO  meets all study inclusion criteria and no exclusion criteria for participation in the PSR research registry. I explained all study related aspects to  DINO , including the study purpose, length of participation and responsibilities of keeping device follow-up schedule, risks and benefits of participation (if any), and privacy, disclosure, and use of personal health information. We reviewed the ICF form page by page and I provided ample time for questions. I explained that participation is voluntary and that participation can be withdrawn at any time. Additionally, I explained the alternative would be not to participate and this decision would in no way change her course of care.  DINO  had no questions at this time and he agreed to participate in the registry. After providing informed consent,  DINO  signed the consent form and I gave him with a copy for her records. I retained the original for the study and medical record.   Upon receiving consent, I reviewed  DINO’s  medical record to obtain the required information for the PSR registry and entered this data into the electronic CRF system.  Will see DINO again for his next scheduled device check. In the meantime, he will call us, as needed, with any questions or concerns.        Estelle Mark RN  Research Coordinator

## 2019-04-04 ENCOUNTER — CLINICAL SUPPORT NO REQUIREMENTS (OUTPATIENT)
Dept: CARDIOLOGY | Facility: CLINIC | Age: 69
End: 2019-04-04

## 2019-04-04 ENCOUNTER — OFFICE VISIT (OUTPATIENT)
Dept: CARDIOLOGY | Facility: CLINIC | Age: 69
End: 2019-04-04

## 2019-04-04 VITALS
SYSTOLIC BLOOD PRESSURE: 144 MMHG | BODY MASS INDEX: 34.21 KG/M2 | HEART RATE: 76 BPM | WEIGHT: 231 LBS | DIASTOLIC BLOOD PRESSURE: 90 MMHG | HEIGHT: 69 IN

## 2019-04-04 DIAGNOSIS — I44.1 SECOND DEGREE HEART BLOCK: Primary | ICD-10-CM

## 2019-04-04 DIAGNOSIS — R00.1 SYMPTOMATIC BRADYCARDIA: ICD-10-CM

## 2019-04-04 PROCEDURE — 93000 ELECTROCARDIOGRAM COMPLETE: CPT | Performed by: INTERNAL MEDICINE

## 2019-04-04 PROCEDURE — 99024 POSTOP FOLLOW-UP VISIT: CPT | Performed by: INTERNAL MEDICINE

## 2019-04-04 NOTE — PROGRESS NOTES
Date of Office Visit: 2019  Encounter Provider: Zbigniew Tam MD  Place of Service: [facility] CARDIOLOGY  Patient Name: Alfredo Toure  :1950    Chief complaint  Follow-up pacemaker for severe symptomatic bradycardia with 2-1 block    HPI: Alfredo Toure is a 68 y.o. male who presents today for follow-up of recent pacemaker implantation.  The patient had symptomatic 2-1 AV block.  He presents doing well.  He is felt improved since placement.  The pacemaker incision is well-healed.  The device is functioning appropriately, and he is 100% ventricularly paced          Past Medical History:   Diagnosis Date   • Arrhythmia     2ND DEGREE HEART BLOCK   • Colon polyp    • Diabetes mellitus (CMS/HCC)    • GERD (gastroesophageal reflux disease)    • Hyperlipidemia    • Hypertension        Past Surgical History:   Procedure Laterality Date   • ABDOMINAL SURGERY      at [facility]   • CARDIAC CATHETERIZATION     • CARDIAC ELECTROPHYSIOLOGY PROCEDURE Left 2/15/2019    Procedure: Pacemaker DC new- [employer];  Surgeon: Zbigniew Tam MD;  Location: [facility] INVASIVE LOCATION;  Service: Cardiology   • CHOLECYSTECTOMY     • COLONOSCOPY     • LUMBAR DISCECTOMY         Social History     Socioeconomic History   • Marital status:      Spouse name: Not on file   • Number of children: Not on file   • Years of education: Not on file   • Highest education level: Not on file   Tobacco Use   • Smoking status: Never Smoker   • Smokeless tobacco: Never Used   • Tobacco comment: Daily caffeine daily   Substance and Sexual Activity   • Alcohol use: Yes     Comment: on occasion - monthly.   alcohol is not the problem   • Drug use: No   • Sexual activity: No       Family History   Problem Relation Age of Onset   • Colon cancer Maternal Uncle        Review of Systems   Constitution: Negative for weakness and malaise/fatigue.   HENT: Negative.    Eyes: Negative.   "  Cardiovascular: Negative for chest pain, dyspnea on exertion, leg swelling and near-syncope.   Respiratory: Negative for cough and shortness of breath.    Endocrine: Negative.    Hematologic/Lymphatic: Negative.    Skin: Negative.    Musculoskeletal: Negative.    Gastrointestinal: Negative.    Genitourinary: Negative.    Neurological: Negative.    Psychiatric/Behavioral: Negative.    Allergic/Immunologic: Negative.        No Known Allergies      Current Outpatient Medications:   •  amLODIPine (NORVASC) 5 MG tablet, Take 5 mg by mouth Daily., Disp: , Rfl: 3  •  aspirin 81 MG EC tablet, Take 81 mg by mouth Daily., Disp: , Rfl:   •  FARXIGA 10 MG tablet, Take 1 tablet by mouth Daily., Disp: , Rfl: 3  •  metFORMIN (GLUCOPHAGE) 1000 MG tablet, Take 1,000 mg by mouth 2 (Two) Times a Day., Disp: , Rfl: 3  •  omeprazole (PRILOSEC) 40 MG capsule, Take 1 capsule by mouth 2 (Two) Times a Day., Disp: 60 capsule, Rfl: 2  •  simvastatin (ZOCOR) 20 MG tablet, Take 20 mg by mouth every night at bedtime., Disp: , Rfl: 3  •  valsartan-hydrochlorothiazide (DIOVAN-HCT) 160-25 MG per tablet, Take 1 tablet by mouth Daily., Disp: , Rfl: 3      Objective:     Vitals:    04/04/19 1127   BP: 144/90   Pulse: 76   Weight: 105 kg (231 lb)   Height: 175.3 cm (69\")     Body mass index is 34.11 kg/m².    PHYSICAL EXAM:    Physical Exam   Constitutional: He is oriented to person, place, and time. He appears well-developed and well-nourished. No distress.   HENT:   Head: Normocephalic and atraumatic.   Right Ear: External ear normal.   Left Ear: External ear normal.   Eyes: Conjunctivae are normal. Right eye exhibits no discharge. Left eye exhibits no discharge.   Neck: Neck supple. No JVD present.   Cardiovascular: Normal rate and normal heart sounds.   Pulmonary/Chest: Effort normal. No respiratory distress.   Pacemaker incision well-healed with no evidence of complication.   Abdominal: Soft. He exhibits no distension. There is no tenderness. "   Musculoskeletal: He exhibits no edema or deformity.   Neurological: He is alert and oriented to person, place, and time. No cranial nerve deficit.   Skin: Skin is warm. He is not diaphoretic.   Psychiatric: He has a normal mood and affect. His behavior is normal. Thought content normal.   Nursing note and vitals reviewed.          ECG 12 Lead  Date/Time: 4/4/2019 3:42 PM  Performed by: Zbigniew Tam MD  Authorized by: Zbigniew Tam MD   Comparison: compared with previous ECG from 2/13/2019  Rhythm: sinus rhythm and paced    Clinical impression: abnormal EKG              Assessment:       Diagnosis Plan   1. Second degree heart block     2. Symptomatic bradycardia            Plan:       Patient feels improved since pacemaker placement.  No evidence of complication.  Pacemaker is functioning appropriately.  It is his lead although it appears that the capture is para history and the QRS is relatively narrow, with great threshold.    As always, it has been a pleasure to participate in your patient's care.      Sincerely,         Zbigniew Tam MD

## 2019-04-16 ENCOUNTER — LAB (OUTPATIENT)
Dept: LAB | Facility: HOSPITAL | Age: 69
End: 2019-04-16

## 2019-04-16 ENCOUNTER — TRANSCRIBE ORDERS (OUTPATIENT)
Dept: ADMINISTRATIVE | Facility: HOSPITAL | Age: 69
End: 2019-04-16

## 2019-04-16 DIAGNOSIS — E11.9 DIABETES MELLITUS WITHOUT COMPLICATION (HCC): Primary | ICD-10-CM

## 2019-04-16 DIAGNOSIS — E11.9 DIABETES MELLITUS WITHOUT COMPLICATION (HCC): ICD-10-CM

## 2019-04-16 DIAGNOSIS — I10 ESSENTIAL HYPERTENSION, MALIGNANT: ICD-10-CM

## 2019-04-16 DIAGNOSIS — E78.5 HYPERLIPIDEMIA, UNSPECIFIED HYPERLIPIDEMIA TYPE: ICD-10-CM

## 2019-04-16 LAB
ALBUMIN SERPL-MCNC: 4 G/DL (ref 3.5–5.2)
ALBUMIN/GLOB SERPL: 1.6 G/DL
ALP SERPL-CCNC: 76 U/L (ref 39–117)
ALT SERPL W P-5'-P-CCNC: 19 U/L (ref 1–41)
ANION GAP SERPL CALCULATED.3IONS-SCNC: 13.8 MMOL/L
AST SERPL-CCNC: 18 U/L (ref 1–40)
BILIRUB SERPL-MCNC: 0.6 MG/DL (ref 0.2–1.2)
BILIRUB UR QL STRIP: NEGATIVE
BUN BLD-MCNC: 15 MG/DL (ref 8–23)
BUN/CREAT SERPL: 20.3 (ref 7–25)
CALCIUM SPEC-SCNC: 9.6 MG/DL (ref 8.6–10.5)
CHLORIDE SERPL-SCNC: 97 MMOL/L (ref 98–107)
CHOLEST SERPL-MCNC: 148 MG/DL (ref 0–200)
CLARITY UR: CLEAR
CO2 SERPL-SCNC: 27.2 MMOL/L (ref 22–29)
COLOR UR: YELLOW
CREAT BLD-MCNC: 0.74 MG/DL (ref 0.76–1.27)
GFR SERPL CREATININE-BSD FRML MDRD: 105 ML/MIN/1.73
GLOBULIN UR ELPH-MCNC: 2.5 GM/DL
GLUCOSE BLD-MCNC: 174 MG/DL (ref 65–99)
GLUCOSE UR STRIP-MCNC: ABNORMAL MG/DL
HBA1C MFR BLD: 7.63 % (ref 4.8–5.6)
HDLC SERPL-MCNC: 39 MG/DL (ref 40–60)
HGB UR QL STRIP.AUTO: NEGATIVE
KETONES UR QL STRIP: ABNORMAL
LDLC SERPL CALC-MCNC: 72 MG/DL (ref 0–100)
LDLC/HDLC SERPL: 1.84 {RATIO}
LEUKOCYTE ESTERASE UR QL STRIP.AUTO: NEGATIVE
NITRITE UR QL STRIP: NEGATIVE
PH UR STRIP.AUTO: <=5 [PH] (ref 5–8)
POTASSIUM BLD-SCNC: 4.2 MMOL/L (ref 3.5–5.2)
PROT SERPL-MCNC: 6.5 G/DL (ref 6–8.5)
PROT UR QL STRIP: NEGATIVE
SODIUM BLD-SCNC: 138 MMOL/L (ref 136–145)
SP GR UR STRIP: >=1.03 (ref 1–1.03)
TRIGL SERPL-MCNC: 186 MG/DL (ref 0–150)
UROBILINOGEN UR QL STRIP: ABNORMAL
VLDLC SERPL-MCNC: 37.2 MG/DL (ref 5–40)

## 2019-04-16 PROCEDURE — 83036 HEMOGLOBIN GLYCOSYLATED A1C: CPT | Performed by: INTERNAL MEDICINE

## 2019-04-16 PROCEDURE — 36415 COLL VENOUS BLD VENIPUNCTURE: CPT

## 2019-04-16 PROCEDURE — 80061 LIPID PANEL: CPT | Performed by: INTERNAL MEDICINE

## 2019-04-16 PROCEDURE — 81003 URINALYSIS AUTO W/O SCOPE: CPT | Performed by: INTERNAL MEDICINE

## 2019-04-16 PROCEDURE — 80053 COMPREHEN METABOLIC PANEL: CPT | Performed by: INTERNAL MEDICINE

## 2019-05-03 RX ORDER — OMEPRAZOLE 40 MG/1
40 CAPSULE, DELAYED RELEASE ORAL 2 TIMES DAILY
Qty: 60 CAPSULE | Refills: 5 | Status: SHIPPED | OUTPATIENT
Start: 2019-05-03

## 2019-05-28 ENCOUNTER — CLINICAL SUPPORT NO REQUIREMENTS (OUTPATIENT)
Dept: CARDIOLOGY | Facility: CLINIC | Age: 69
End: 2019-05-28

## 2019-05-28 DIAGNOSIS — I44.1 SECOND DEGREE AV BLOCK: Primary | ICD-10-CM

## 2019-05-28 PROCEDURE — 93280 PM DEVICE PROGR EVAL DUAL: CPT | Performed by: INTERNAL MEDICINE

## 2019-07-05 ENCOUNTER — TELEPHONE (OUTPATIENT)
Dept: CARDIOLOGY | Facility: CLINIC | Age: 69
End: 2019-07-05

## 2019-07-05 NOTE — TELEPHONE ENCOUNTER
Pt called. He said he has been having muscle twitching and spasm in the the area of his pacemaker put in on 2/15/19. It has become bothersome.  He would like to know if this could be an issue.  He can be reached at #[phone].    Thanks,  Meghann

## 2019-08-26 ENCOUNTER — LAB (OUTPATIENT)
Dept: LAB | Facility: HOSPITAL | Age: 69
End: 2019-08-26

## 2019-08-26 ENCOUNTER — TRANSCRIBE ORDERS (OUTPATIENT)
Dept: ADMINISTRATIVE | Facility: HOSPITAL | Age: 69
End: 2019-08-26

## 2019-08-26 DIAGNOSIS — I10 ESSENTIAL HYPERTENSION, MALIGNANT: ICD-10-CM

## 2019-08-26 DIAGNOSIS — E11.9 DIABETES MELLITUS WITHOUT COMPLICATION (HCC): Primary | ICD-10-CM

## 2019-08-26 DIAGNOSIS — E78.5 HYPERLIPIDEMIA, UNSPECIFIED HYPERLIPIDEMIA TYPE: ICD-10-CM

## 2019-08-26 DIAGNOSIS — E11.9 DIABETES MELLITUS WITHOUT COMPLICATION (HCC): ICD-10-CM

## 2019-08-26 LAB
ALBUMIN SERPL-MCNC: 4.3 G/DL (ref 3.5–5.2)
ALBUMIN/GLOB SERPL: 2 G/DL
ALP SERPL-CCNC: 90 U/L (ref 39–117)
ALT SERPL W P-5'-P-CCNC: 12 U/L (ref 1–41)
ANION GAP SERPL CALCULATED.3IONS-SCNC: 9.8 MMOL/L (ref 5–15)
AST SERPL-CCNC: 16 U/L (ref 1–40)
BILIRUB SERPL-MCNC: 0.8 MG/DL (ref 0.2–1.2)
BILIRUB UR QL STRIP: NEGATIVE
BUN BLD-MCNC: 12 MG/DL (ref 8–23)
BUN/CREAT SERPL: 13.6 (ref 7–25)
CALCIUM SPEC-SCNC: 9.5 MG/DL (ref 8.6–10.5)
CHLORIDE SERPL-SCNC: 102 MMOL/L (ref 98–107)
CHOLEST SERPL-MCNC: 120 MG/DL (ref 0–200)
CLARITY UR: CLEAR
CO2 SERPL-SCNC: 30.2 MMOL/L (ref 22–29)
COLOR UR: YELLOW
CREAT BLD-MCNC: 0.88 MG/DL (ref 0.76–1.27)
GFR SERPL CREATININE-BSD FRML MDRD: 86 ML/MIN/1.73
GLOBULIN UR ELPH-MCNC: 2.1 GM/DL
GLUCOSE BLD-MCNC: 134 MG/DL (ref 65–99)
GLUCOSE UR STRIP-MCNC: ABNORMAL MG/DL
HBA1C MFR BLD: 6.8 % (ref 4.8–5.6)
HDLC SERPL-MCNC: 38 MG/DL (ref 40–60)
HGB UR QL STRIP.AUTO: NEGATIVE
KETONES UR QL STRIP: ABNORMAL
LDLC SERPL CALC-MCNC: 58 MG/DL (ref 0–100)
LDLC/HDLC SERPL: 1.52 {RATIO}
LEUKOCYTE ESTERASE UR QL STRIP.AUTO: NEGATIVE
NITRITE UR QL STRIP: NEGATIVE
PH UR STRIP.AUTO: <=5 [PH] (ref 5–8)
POTASSIUM BLD-SCNC: 4.8 MMOL/L (ref 3.5–5.2)
PROT SERPL-MCNC: 6.4 G/DL (ref 6–8.5)
PROT UR QL STRIP: NEGATIVE
SODIUM BLD-SCNC: 142 MMOL/L (ref 136–145)
SP GR UR STRIP: >=1.03 (ref 1–1.03)
TRIGL SERPL-MCNC: 122 MG/DL (ref 0–150)
UROBILINOGEN UR QL STRIP: ABNORMAL
VLDLC SERPL-MCNC: 24.4 MG/DL (ref 5–40)

## 2019-08-26 PROCEDURE — 81003 URINALYSIS AUTO W/O SCOPE: CPT | Performed by: INTERNAL MEDICINE

## 2019-08-26 PROCEDURE — 80061 LIPID PANEL: CPT | Performed by: INTERNAL MEDICINE

## 2019-08-26 PROCEDURE — 36415 COLL VENOUS BLD VENIPUNCTURE: CPT

## 2019-08-26 PROCEDURE — 83036 HEMOGLOBIN GLYCOSYLATED A1C: CPT | Performed by: INTERNAL MEDICINE

## 2019-08-26 PROCEDURE — 80053 COMPREHEN METABOLIC PANEL: CPT | Performed by: INTERNAL MEDICINE

## 2019-08-31 ENCOUNTER — CLINICAL SUPPORT NO REQUIREMENTS (OUTPATIENT)
Dept: CARDIOLOGY | Facility: CLINIC | Age: 69
End: 2019-08-31

## 2019-08-31 DIAGNOSIS — I44.1 SECOND DEGREE AV BLOCK: Primary | ICD-10-CM

## 2019-08-31 PROCEDURE — 93296 REM INTERROG EVL PM/IDS: CPT | Performed by: INTERNAL MEDICINE

## 2019-08-31 PROCEDURE — 93294 REM INTERROG EVL PM/LDLS PM: CPT | Performed by: INTERNAL MEDICINE

## 2019-09-03 ENCOUNTER — TELEPHONE (OUTPATIENT)
Dept: CARDIOLOGY | Facility: CLINIC | Age: 69
End: 2019-09-03

## 2019-09-03 NOTE — TELEPHONE ENCOUNTER
Dr An is not in the office today, so I have sent this to Dr Tam as well.   FYI, Per routine pacemaker check today:    1 NST from 7/7 1325 shows 6 beat VT @200bpm. This is first and only VT recorded since HIS pacemaker implant 2/15/19. Unable to reach pt by phone to inquire about symptoms. Msg left requesting call back.   NS

## 2019-11-26 ENCOUNTER — LAB (OUTPATIENT)
Dept: LAB | Facility: HOSPITAL | Age: 69
End: 2019-11-26

## 2019-11-26 ENCOUNTER — TRANSCRIBE ORDERS (OUTPATIENT)
Dept: ADMINISTRATIVE | Facility: HOSPITAL | Age: 69
End: 2019-11-26

## 2019-11-26 DIAGNOSIS — E11.9 DIABETES MELLITUS WITHOUT COMPLICATION (HCC): ICD-10-CM

## 2019-11-26 DIAGNOSIS — E78.5 HYPERLIPIDEMIA, UNSPECIFIED HYPERLIPIDEMIA TYPE: ICD-10-CM

## 2019-11-26 DIAGNOSIS — I10 ESSENTIAL HYPERTENSION, MALIGNANT: ICD-10-CM

## 2019-11-26 DIAGNOSIS — I10 ESSENTIAL HYPERTENSION, MALIGNANT: Primary | ICD-10-CM

## 2019-11-26 LAB
ALBUMIN SERPL-MCNC: 4.3 G/DL (ref 3.5–5.2)
ALBUMIN/GLOB SERPL: 1.5 G/DL
ALP SERPL-CCNC: 99 U/L (ref 39–117)
ALT SERPL W P-5'-P-CCNC: 14 U/L (ref 1–41)
ANION GAP SERPL CALCULATED.3IONS-SCNC: 11.5 MMOL/L (ref 5–15)
AST SERPL-CCNC: 12 U/L (ref 1–40)
BILIRUB SERPL-MCNC: 0.7 MG/DL (ref 0.2–1.2)
BUN BLD-MCNC: 13 MG/DL (ref 8–23)
BUN/CREAT SERPL: 17.6 (ref 7–25)
CALCIUM SPEC-SCNC: 9.8 MG/DL (ref 8.6–10.5)
CHLORIDE SERPL-SCNC: 96 MMOL/L (ref 98–107)
CHOLEST SERPL-MCNC: 164 MG/DL (ref 0–200)
CO2 SERPL-SCNC: 29.5 MMOL/L (ref 22–29)
CREAT BLD-MCNC: 0.74 MG/DL (ref 0.76–1.27)
GFR SERPL CREATININE-BSD FRML MDRD: 104 ML/MIN/1.73
GLOBULIN UR ELPH-MCNC: 2.8 GM/DL
GLUCOSE BLD-MCNC: 166 MG/DL (ref 65–99)
HBA1C MFR BLD: 8.51 % (ref 4.8–5.6)
HDLC SERPL-MCNC: 41 MG/DL (ref 40–60)
LDLC SERPL CALC-MCNC: 85 MG/DL (ref 0–100)
LDLC/HDLC SERPL: 2.08 {RATIO}
POTASSIUM BLD-SCNC: 4.3 MMOL/L (ref 3.5–5.2)
PROT SERPL-MCNC: 7.1 G/DL (ref 6–8.5)
SODIUM BLD-SCNC: 137 MMOL/L (ref 136–145)
TRIGL SERPL-MCNC: 189 MG/DL (ref 0–150)
VLDLC SERPL-MCNC: 37.8 MG/DL (ref 5–40)

## 2019-11-26 PROCEDURE — 80061 LIPID PANEL: CPT | Performed by: INTERNAL MEDICINE

## 2019-11-26 PROCEDURE — 83036 HEMOGLOBIN GLYCOSYLATED A1C: CPT | Performed by: INTERNAL MEDICINE

## 2019-11-26 PROCEDURE — 36415 COLL VENOUS BLD VENIPUNCTURE: CPT

## 2019-11-26 PROCEDURE — 80053 COMPREHEN METABOLIC PANEL: CPT | Performed by: INTERNAL MEDICINE

## 2019-12-03 ENCOUNTER — TELEPHONE (OUTPATIENT)
Dept: CARDIOLOGY | Facility: CLINIC | Age: 69
End: 2019-12-03

## 2019-12-03 ENCOUNTER — CLINICAL SUPPORT NO REQUIREMENTS (OUTPATIENT)
Dept: CARDIOLOGY | Facility: CLINIC | Age: 69
End: 2019-12-03

## 2019-12-03 DIAGNOSIS — I44.1 SECOND DEGREE AV BLOCK: Primary | ICD-10-CM

## 2019-12-03 PROCEDURE — 93280 PM DEVICE PROGR EVAL DUAL: CPT | Performed by: INTERNAL MEDICINE

## 2019-12-03 NOTE — TELEPHONE ENCOUNTER
Since last check on 8/31/19, 1 nonsustained VT on 10/14/19 @ 15:05, 10 beats @ 166bpm,  See below.  Appt. With you on 5/1/20. Thanks. Sade

## 2020-02-07 ENCOUNTER — LAB (OUTPATIENT)
Dept: LAB | Facility: HOSPITAL | Age: 70
End: 2020-02-07

## 2020-02-07 ENCOUNTER — TRANSCRIBE ORDERS (OUTPATIENT)
Dept: ADMINISTRATIVE | Facility: HOSPITAL | Age: 70
End: 2020-02-07

## 2020-02-07 DIAGNOSIS — E78.5 HYPERLIPIDEMIA, UNSPECIFIED HYPERLIPIDEMIA TYPE: ICD-10-CM

## 2020-02-07 DIAGNOSIS — Z00.00 ROUTINE GENERAL MEDICAL EXAMINATION AT A HEALTH CARE FACILITY: ICD-10-CM

## 2020-02-07 DIAGNOSIS — E11.9 DIABETES MELLITUS WITHOUT COMPLICATION (HCC): ICD-10-CM

## 2020-02-07 DIAGNOSIS — E11.9 DIABETES MELLITUS WITHOUT COMPLICATION (HCC): Primary | ICD-10-CM

## 2020-02-07 DIAGNOSIS — I10 ESSENTIAL HYPERTENSION, MALIGNANT: ICD-10-CM

## 2020-02-07 DIAGNOSIS — Z12.5 SPECIAL SCREENING FOR MALIGNANT NEOPLASM OF PROSTATE: ICD-10-CM

## 2020-02-07 LAB
ALBUMIN SERPL-MCNC: 4.3 G/DL (ref 3.5–5.2)
ALBUMIN/GLOB SERPL: 1.9 G/DL
ALP SERPL-CCNC: 71 U/L (ref 39–117)
ALT SERPL W P-5'-P-CCNC: 12 U/L (ref 1–41)
ANION GAP SERPL CALCULATED.3IONS-SCNC: 14.9 MMOL/L (ref 5–15)
AST SERPL-CCNC: 18 U/L (ref 1–40)
BASOPHILS # BLD AUTO: 0.02 10*3/MM3 (ref 0–0.2)
BASOPHILS NFR BLD AUTO: 0.3 % (ref 0–1.5)
BILIRUB SERPL-MCNC: 0.8 MG/DL (ref 0.2–1.2)
BILIRUB UR QL STRIP: NEGATIVE
BUN BLD-MCNC: 13 MG/DL (ref 8–23)
BUN/CREAT SERPL: 14 (ref 7–25)
CALCIUM SPEC-SCNC: 9.5 MG/DL (ref 8.6–10.5)
CHLORIDE SERPL-SCNC: 96 MMOL/L (ref 98–107)
CHOLEST SERPL-MCNC: 110 MG/DL (ref 0–200)
CLARITY UR: CLEAR
CO2 SERPL-SCNC: 28.1 MMOL/L (ref 22–29)
COLOR UR: YELLOW
CREAT BLD-MCNC: 0.93 MG/DL (ref 0.76–1.27)
DEPRECATED RDW RBC AUTO: 40.6 FL (ref 37–54)
EOSINOPHIL # BLD AUTO: 0.14 10*3/MM3 (ref 0–0.4)
EOSINOPHIL NFR BLD AUTO: 2 % (ref 0.3–6.2)
ERYTHROCYTE [DISTWIDTH] IN BLOOD BY AUTOMATED COUNT: 13.4 % (ref 12.3–15.4)
GFR SERPL CREATININE-BSD FRML MDRD: 80 ML/MIN/1.73
GLOBULIN UR ELPH-MCNC: 2.3 GM/DL
GLUCOSE BLD-MCNC: 97 MG/DL (ref 65–99)
GLUCOSE UR STRIP-MCNC: ABNORMAL MG/DL
HBA1C MFR BLD: 5.84 % (ref 4.8–5.6)
HCT VFR BLD AUTO: 46.4 % (ref 37.5–51)
HDLC SERPL-MCNC: 42 MG/DL (ref 40–60)
HGB BLD-MCNC: 16.3 G/DL (ref 13–17.7)
HGB UR QL STRIP.AUTO: NEGATIVE
IMM GRANULOCYTES # BLD AUTO: 0.03 10*3/MM3 (ref 0–0.05)
IMM GRANULOCYTES NFR BLD AUTO: 0.4 % (ref 0–0.5)
KETONES UR QL STRIP: ABNORMAL
LDLC SERPL CALC-MCNC: 39 MG/DL (ref 0–100)
LDLC/HDLC SERPL: 0.94 {RATIO}
LEUKOCYTE ESTERASE UR QL STRIP.AUTO: NEGATIVE
LYMPHOCYTES # BLD AUTO: 1.36 10*3/MM3 (ref 0.7–3.1)
LYMPHOCYTES NFR BLD AUTO: 19.7 % (ref 19.6–45.3)
MCH RBC QN AUTO: 29.5 PG (ref 26.6–33)
MCHC RBC AUTO-ENTMCNC: 35.1 G/DL (ref 31.5–35.7)
MCV RBC AUTO: 84.1 FL (ref 79–97)
MONOCYTES # BLD AUTO: 0.74 10*3/MM3 (ref 0.1–0.9)
MONOCYTES NFR BLD AUTO: 10.7 % (ref 5–12)
NEUTROPHILS # BLD AUTO: 4.63 10*3/MM3 (ref 1.7–7)
NEUTROPHILS NFR BLD AUTO: 66.9 % (ref 42.7–76)
NITRITE UR QL STRIP: NEGATIVE
NRBC BLD AUTO-RTO: 0 /100 WBC (ref 0–0.2)
PH UR STRIP.AUTO: 5.5 [PH] (ref 5–8)
PLATELET # BLD AUTO: 237 10*3/MM3 (ref 140–450)
PMV BLD AUTO: 9.6 FL (ref 6–12)
POTASSIUM BLD-SCNC: 4.3 MMOL/L (ref 3.5–5.2)
PROT SERPL-MCNC: 6.6 G/DL (ref 6–8.5)
PROT UR QL STRIP: NEGATIVE
PSA SERPL-MCNC: 5.5 NG/ML (ref 0–4)
RBC # BLD AUTO: 5.52 10*6/MM3 (ref 4.14–5.8)
SODIUM BLD-SCNC: 139 MMOL/L (ref 136–145)
SP GR UR STRIP: 1.03 (ref 1–1.03)
TRIGL SERPL-MCNC: 143 MG/DL (ref 0–150)
UROBILINOGEN UR QL STRIP: ABNORMAL
VLDLC SERPL-MCNC: 28.6 MG/DL (ref 5–40)
WBC NRBC COR # BLD: 6.92 10*3/MM3 (ref 3.4–10.8)

## 2020-02-07 PROCEDURE — G0103 PSA SCREENING: HCPCS | Performed by: INTERNAL MEDICINE

## 2020-02-07 PROCEDURE — 80053 COMPREHEN METABOLIC PANEL: CPT | Performed by: INTERNAL MEDICINE

## 2020-02-07 PROCEDURE — 36415 COLL VENOUS BLD VENIPUNCTURE: CPT

## 2020-02-07 PROCEDURE — 81003 URINALYSIS AUTO W/O SCOPE: CPT | Performed by: INTERNAL MEDICINE

## 2020-02-07 PROCEDURE — 83036 HEMOGLOBIN GLYCOSYLATED A1C: CPT | Performed by: INTERNAL MEDICINE

## 2020-02-07 PROCEDURE — 85025 COMPLETE CBC W/AUTO DIFF WBC: CPT | Performed by: INTERNAL MEDICINE

## 2020-02-07 PROCEDURE — 80061 LIPID PANEL: CPT | Performed by: INTERNAL MEDICINE

## 2020-03-04 ENCOUNTER — CLINICAL SUPPORT NO REQUIREMENTS (OUTPATIENT)
Dept: CARDIOLOGY | Facility: CLINIC | Age: 70
End: 2020-03-04

## 2020-03-04 DIAGNOSIS — I44.1 SECOND DEGREE AV BLOCK: Primary | ICD-10-CM

## 2020-03-04 PROCEDURE — 93294 REM INTERROG EVL PM/LDLS PM: CPT | Performed by: INTERNAL MEDICINE

## 2020-03-04 PROCEDURE — 93296 REM INTERROG EVL PM/IDS: CPT | Performed by: INTERNAL MEDICINE

## 2020-04-08 ENCOUNTER — RESEARCH ENCOUNTER (OUTPATIENT)
Dept: CARDIOLOGY | Facility: CLINIC | Age: 70
End: 2020-04-08

## 2020-04-08 NOTE — RESEARCH
[facility]  [address]  [place] [address]  [phone]    Research Note:   PSR Registry     Patient Information:  Alfredo Toure    Study ID#: [id number]  Subject Initials: DINO  Qualifying Lead:  3830     DINO is enrolled is the PSR Study, with a qualifying Medtronic 3830 HIS Bundle lead. We were contacted by the study CRA  today about entering 12 month follow-up visits for patients with 3830 lead in HIS Bundle.  Upon review of his medical record, a remote visit on 3/4/2020 was identified for the requested time period.  I completed an electrical worksheet for this visit as per study protocol and entered it today in the EDC.  There were no adverse events to report since his last study visit.  We will see LETICIA at the time of his next PSR study assessment.            Estelle Mark RN  Research Coordinator

## 2020-04-27 ENCOUNTER — TELEPHONE (OUTPATIENT)
Dept: CARDIOLOGY | Facility: CLINIC | Age: 70
End: 2020-04-27

## 2020-04-27 RX ORDER — OMEPRAZOLE 40 MG/1
40 CAPSULE, DELAYED RELEASE ORAL 2 TIMES DAILY
Qty: 60 CAPSULE | Refills: 5 | OUTPATIENT
Start: 2020-04-27

## 2020-04-27 NOTE — TELEPHONE ENCOUNTER
Pt called wanting to r/s his appt for 7/6 when he has his pacemaker checked. Please advise.    Mer FU CMA

## 2020-05-14 ENCOUNTER — TRANSCRIBE ORDERS (OUTPATIENT)
Dept: ADMINISTRATIVE | Facility: HOSPITAL | Age: 70
End: 2020-05-14

## 2020-05-14 ENCOUNTER — LAB (OUTPATIENT)
Dept: LAB | Facility: HOSPITAL | Age: 70
End: 2020-05-14

## 2020-05-14 DIAGNOSIS — E78.5 HYPERLIPIDEMIA, UNSPECIFIED HYPERLIPIDEMIA TYPE: Primary | ICD-10-CM

## 2020-05-14 DIAGNOSIS — E11.9 DIABETES MELLITUS WITHOUT COMPLICATION (HCC): ICD-10-CM

## 2020-05-14 DIAGNOSIS — E78.5 HYPERLIPIDEMIA, UNSPECIFIED HYPERLIPIDEMIA TYPE: ICD-10-CM

## 2020-05-14 DIAGNOSIS — I10 ESSENTIAL HYPERTENSION, MALIGNANT: ICD-10-CM

## 2020-05-14 LAB
ALBUMIN SERPL-MCNC: 3.7 G/DL (ref 3.5–5.2)
ALBUMIN/GLOB SERPL: 1.5 G/DL
ALP SERPL-CCNC: 76 U/L (ref 39–117)
ALT SERPL W P-5'-P-CCNC: 11 U/L (ref 1–41)
ANION GAP SERPL CALCULATED.3IONS-SCNC: 9.3 MMOL/L (ref 5–15)
AST SERPL-CCNC: 13 U/L (ref 1–40)
BILIRUB SERPL-MCNC: 0.5 MG/DL (ref 0.2–1.2)
BILIRUB UR QL STRIP: NEGATIVE
BUN BLD-MCNC: 14 MG/DL (ref 8–23)
BUN/CREAT SERPL: 18.7 (ref 7–25)
CALCIUM SPEC-SCNC: 9.2 MG/DL (ref 8.6–10.5)
CHLORIDE SERPL-SCNC: 99 MMOL/L (ref 98–107)
CHOLEST SERPL-MCNC: 103 MG/DL (ref 0–200)
CLARITY UR: CLEAR
CO2 SERPL-SCNC: 30.7 MMOL/L (ref 22–29)
COLOR UR: YELLOW
CREAT BLD-MCNC: 0.75 MG/DL (ref 0.76–1.27)
GFR SERPL CREATININE-BSD FRML MDRD: 103 ML/MIN/1.73
GLOBULIN UR ELPH-MCNC: 2.5 GM/DL
GLUCOSE BLD-MCNC: 125 MG/DL (ref 65–99)
GLUCOSE UR STRIP-MCNC: ABNORMAL MG/DL
HBA1C MFR BLD: 5.76 % (ref 4.8–5.6)
HDLC SERPL-MCNC: 39 MG/DL (ref 40–60)
HGB UR QL STRIP.AUTO: NEGATIVE
KETONES UR QL STRIP: NEGATIVE
LDLC SERPL CALC-MCNC: 43 MG/DL (ref 0–100)
LDLC/HDLC SERPL: 1.1 {RATIO}
LEUKOCYTE ESTERASE UR QL STRIP.AUTO: NEGATIVE
NITRITE UR QL STRIP: NEGATIVE
PH UR STRIP.AUTO: 5.5 [PH] (ref 5–8)
POTASSIUM BLD-SCNC: 4.2 MMOL/L (ref 3.5–5.2)
PROT SERPL-MCNC: 6.2 G/DL (ref 6–8.5)
PROT UR QL STRIP: NEGATIVE
SODIUM BLD-SCNC: 139 MMOL/L (ref 136–145)
SP GR UR STRIP: >=1.03 (ref 1–1.03)
TRIGL SERPL-MCNC: 106 MG/DL (ref 0–150)
UROBILINOGEN UR QL STRIP: ABNORMAL
VLDLC SERPL-MCNC: 21.2 MG/DL (ref 5–40)

## 2020-05-14 PROCEDURE — 80061 LIPID PANEL: CPT | Performed by: INTERNAL MEDICINE

## 2020-05-14 PROCEDURE — 81003 URINALYSIS AUTO W/O SCOPE: CPT | Performed by: INTERNAL MEDICINE

## 2020-05-14 PROCEDURE — 80053 COMPREHEN METABOLIC PANEL: CPT | Performed by: INTERNAL MEDICINE

## 2020-05-14 PROCEDURE — 83036 HEMOGLOBIN GLYCOSYLATED A1C: CPT | Performed by: INTERNAL MEDICINE

## 2020-05-14 PROCEDURE — 36415 COLL VENOUS BLD VENIPUNCTURE: CPT

## 2020-07-06 ENCOUNTER — CLINICAL SUPPORT NO REQUIREMENTS (OUTPATIENT)
Dept: CARDIOLOGY | Facility: CLINIC | Age: 70
End: 2020-07-06

## 2020-07-06 ENCOUNTER — OFFICE VISIT (OUTPATIENT)
Dept: CARDIOLOGY | Facility: CLINIC | Age: 70
End: 2020-07-06

## 2020-07-06 VITALS
HEART RATE: 77 BPM | HEIGHT: 69 IN | DIASTOLIC BLOOD PRESSURE: 68 MMHG | WEIGHT: 189 LBS | SYSTOLIC BLOOD PRESSURE: 118 MMHG | BODY MASS INDEX: 27.99 KG/M2

## 2020-07-06 DIAGNOSIS — I44.1 SECOND DEGREE HEART BLOCK: Primary | ICD-10-CM

## 2020-07-06 DIAGNOSIS — I45.10 RBBB: ICD-10-CM

## 2020-07-06 DIAGNOSIS — R00.1 SYMPTOMATIC BRADYCARDIA: ICD-10-CM

## 2020-07-06 DIAGNOSIS — R06.09 DYSPNEA ON EXERTION: ICD-10-CM

## 2020-07-06 DIAGNOSIS — Z95.0 HISTORY OF PERMANENT CARDIAC PACEMAKER PLACEMENT: ICD-10-CM

## 2020-07-06 DIAGNOSIS — I35.0 AORTIC STENOSIS, MODERATE: ICD-10-CM

## 2020-07-06 PROCEDURE — 93000 ELECTROCARDIOGRAM COMPLETE: CPT | Performed by: NURSE PRACTITIONER

## 2020-07-06 PROCEDURE — 93280 PM DEVICE PROGR EVAL DUAL: CPT | Performed by: INTERNAL MEDICINE

## 2020-07-06 PROCEDURE — 99213 OFFICE O/P EST LOW 20 MIN: CPT | Performed by: NURSE PRACTITIONER

## 2020-07-06 RX ORDER — ROSUVASTATIN CALCIUM 10 MG/1
10 TABLET, COATED ORAL DAILY
COMMUNITY
End: 2021-04-23

## 2020-07-06 NOTE — PROGRESS NOTES
Date of Office Visit: 2020  Encounter Provider: WICHO Umana  Place of Service: [facility] CARDIOLOGY  Patient Name: Alfredo Toure  :1950    Chief Complaint   Patient presents with   • RBBB   • SECOND DEGREE HEART BLOCK   :     HPI: Alfredo Toure is a 69-year-old male who is a patient of Dr. An and is new to me today.  He recently was seen by Dr. Zbigniew Eldridge for 2-1 AV block for which she was symptomatic.  He underwent a permanent pacemaker implant.  His last follow-up with him was in April and he was doing well.  He had been seeing us for dyspnea with exertion and fatigue previously.    Last year he had an echocardiogram that showed normal LV systolic function he had some borderline LVH with an EF of 63%.  There was some moderate thickening of the aortic valve with moderate aortic stenosis.  The aortic valve area was 1.5 cm² with a mean gradient of 21.9 mmHg.     He has been doing fairly well.  He did have a injury working out in his yard where a tree limb came and hit him in his leg.  He had a small cut there and appears to have developed a hematoma.  It is been about the same size it appears the size of a golf ball.  He states that the bruising has extended down into his foot and ankle.  There is just some trace edema.  He denies any chest pain, headache or palpitations.  Since getting his pacemaker he is felt much better.    Past Medical History:   Diagnosis Date   • Arrhythmia     2ND DEGREE HEART BLOCK   • Colon polyp    • Diabetes mellitus (CMS/HCC)    • GERD (gastroesophageal reflux disease)    • Hyperlipidemia    • Hypertension        Past Surgical History:   Procedure Laterality Date   • ABDOMINAL SURGERY      at [facility]   • CARDIAC CATHETERIZATION     • CARDIAC ELECTROPHYSIOLOGY PROCEDURE Left 2/15/2019    Procedure: Pacemaker DC new- [employer];  Surgeon: Zbigniew Tam MD;  Location: [facility] INVASIVE LOCATION;   Service: Cardiology   • CHOLECYSTECTOMY     • COLONOSCOPY     • LUMBAR DISCECTOMY         Social History     Socioeconomic History   • Marital status:      Spouse name: Not on file   • Number of children: Not on file   • Years of education: Not on file   • Highest education level: Not on file   Tobacco Use   • Smoking status: Never Smoker   • Smokeless tobacco: Never Used   • Tobacco comment: Daily caffeine daily   Substance and Sexual Activity   • Alcohol use: Yes     Comment: on occasion - monthly.   alcohol is not the problem   • Drug use: No   • Sexual activity: Never       Family History   Problem Relation Age of Onset   • Colon cancer Maternal Uncle        Review of Systems   Constitution: Negative for diaphoresis and malaise/fatigue.   Cardiovascular: Negative for chest pain, claudication, dyspnea on exertion, irregular heartbeat, leg swelling, near-syncope, orthopnea, palpitations, paroxysmal nocturnal dyspnea and syncope.   Respiratory: Negative for cough, shortness of breath and sleep disturbances due to breathing.    Musculoskeletal: Negative for falls.   Neurological: Negative for dizziness and weakness.   Psychiatric/Behavioral: Negative for altered mental status and substance abuse.       No Known Allergies      Current Outpatient Medications:   •  amLODIPine (NORVASC) 5 MG tablet, Take 5 mg by mouth Daily., Disp: , Rfl: 3  •  aspirin 81 MG EC tablet, Take 81 mg by mouth Daily., Disp: , Rfl:   •  FARXIGA 10 MG tablet, Take 1 tablet by mouth Daily., Disp: , Rfl: 3  •  metFORMIN (GLUCOPHAGE) 1000 MG tablet, Take 1,000 mg by mouth 2 (Two) Times a Day., Disp: , Rfl: 3  •  omeprazole (PRILOSEC) 40 MG capsule, Take 1 capsule by mouth 2 (Two) Times a Day., Disp: 60 capsule, Rfl: 5  •  rosuvastatin (CRESTOR) 10 MG tablet, Take 10 mg by mouth Daily., Disp: , Rfl:   •  Semaglutide,0.25 or 0.5MG/DOS, (OZEMPIC) 2 MG/1.5ML solution pen-injector, Inject 0.5 mg under the skin into the appropriate area as  "directed 1 (One) Time Per Week., Disp: , Rfl:   •  valsartan-hydrochlorothiazide (DIOVAN-HCT) 160-25 MG per tablet, Take 1 tablet by mouth Daily., Disp: , Rfl: 3      Objective:     Vitals:    07/06/20 1247   BP: 118/68   Pulse: 77   Weight: 85.7 kg (189 lb)   Height: 175.3 cm (69\")     Body mass index is 27.91 kg/m².    PHYSICAL EXAM:    Physical Exam   Constitutional: He is oriented to person, place, and time. He appears well-developed and well-nourished. No distress.   HENT:   Head: Normocephalic.   Eyes: Pupils are equal, round, and reactive to light. EOM are normal.   Neck: Normal range of motion. Neck supple. No JVD present. Carotid bruit is not present. No edema present.   Cardiovascular: Normal rate, regular rhythm, S1 normal, S2 normal and intact distal pulses. Exam reveals no gallop.   Murmur heard.   Harsh early systolic murmur is present with a grade of 1/6 at the upper right sternal border radiating to the neck.  Pulses:       Radial pulses are 2+ on the right side, and 2+ on the left side.        Dorsalis pedis pulses are 2+ on the right side, and 2+ on the left side.   Pulmonary/Chest: Effort normal and breath sounds normal. No tachypnea. He has no wheezes. He has no rales.   Abdominal: Soft. Normal appearance and bowel sounds are normal. He exhibits no ascites. There is no tenderness.   Musculoskeletal: Normal range of motion. He exhibits no edema.   Neurological: He is alert and oriented to person, place, and time.   Skin: Skin is warm and dry.        Psychiatric: He has a normal mood and affect.         ECG 12 Lead  Date/Time: 7/6/2020 2:20 PM  Performed by: Odilia Paayn APRN  Authorized by: Odilia Payan APRN   Comparison: compared with previous ECG from 2/4/2020  Rhythm: paced  Rate: normal  Pacing: ventricular pacing  Clinical impression: normal ECG              Assessment:       Diagnosis Plan   1. Second degree heart block     2. RBBB     3. Symptomatic bradycardia     4. Dyspnea on " exertion     5. History of permanent cardiac pacemaker placement     6. Aortic stenosis, moderate       No orders of the defined types were placed in this encounter.         Plan:       Overall Mr. Toure is doing fairly well.  He is lost a remarkable amount of weight from last year about 40 to 45 pounds.  He was started on Ozempic by his primary care doctor.  This has greatly reduced his hemoglobin A1c from 8.5-5.7.  His cholesterol levels are within 8 recommended range for someone with diabetes.  On his device interrogation he had a 9 beat episode of nonsustained VT.  It happened at about 550 in the morning when he was asleep.  He has not had any symptoms and this is a single episode.  We will continue to observe for now.  I did recommend he go see his family doctor for the hematoma on his left lower extremity he may have developed some mild cellulitis.  I think it should be monitored closely especially given his diabetes.  He understands and agrees he will come back in 6 months and see Dr. An.       Your medication list           Accurate as of July 6, 2020  1:33 PM. If you have any questions, ask your nurse or doctor.               CONTINUE taking these medications      Instructions Last Dose Given Next Dose Due   amLODIPine 5 MG tablet  Commonly known as:  NORVASC      Take 5 mg by mouth Daily.       aspirin 81 MG EC tablet      Take 81 mg by mouth Daily.       Farxiga 10 MG tablet  Generic drug:  Dapagliflozin Propanediol      Take 1 tablet by mouth Daily.       metFORMIN 1000 MG tablet  Commonly known as:  GLUCOPHAGE      Take 1,000 mg by mouth 2 (Two) Times a Day.       omeprazole 40 MG capsule  Commonly known as:  PrilOSEC      Take 1 capsule by mouth 2 (Two) Times a Day.       rosuvastatin 10 MG tablet  Commonly known as:  CRESTOR      Take 10 mg by mouth Daily.       Semaglutide(0.25 or 0.5MG/DOS) 2 MG/1.5ML solution pen-injector  Commonly known as:  OZEMPIC      Inject 0.5 mg under the skin into the  appropriate area as directed 1 (One) Time Per Week.       valsartan-hydrochlorothiazide 160-25 MG per tablet  Commonly known as:  DIOVAN-HCT      Take 1 tablet by mouth Daily.          STOP taking these medications    simvastatin 20 MG tablet  Commonly known as:  ZOCOR  Stopped by:  WICHO Umana                 As always, it has been a pleasure to participate in your patient's care.      Sincerely,     Odilia SANDS

## 2020-08-21 ENCOUNTER — LAB (OUTPATIENT)
Dept: LAB | Facility: HOSPITAL | Age: 70
End: 2020-08-21

## 2020-08-21 ENCOUNTER — TRANSCRIBE ORDERS (OUTPATIENT)
Dept: ADMINISTRATIVE | Facility: HOSPITAL | Age: 70
End: 2020-08-21

## 2020-08-21 DIAGNOSIS — I10 ESSENTIAL HYPERTENSION, MALIGNANT: ICD-10-CM

## 2020-08-21 DIAGNOSIS — E78.5 HYPERLIPIDEMIA, UNSPECIFIED HYPERLIPIDEMIA TYPE: ICD-10-CM

## 2020-08-21 DIAGNOSIS — E11.9 DIABETES MELLITUS WITHOUT COMPLICATION (HCC): Primary | ICD-10-CM

## 2020-08-21 DIAGNOSIS — E11.9 DIABETES MELLITUS WITHOUT COMPLICATION (HCC): ICD-10-CM

## 2020-08-21 LAB
ALBUMIN SERPL-MCNC: 4.2 G/DL (ref 3.5–5.2)
ALBUMIN/GLOB SERPL: 2 G/DL
ALP SERPL-CCNC: 80 U/L (ref 39–117)
ALT SERPL W P-5'-P-CCNC: 9 U/L (ref 1–41)
ANION GAP SERPL CALCULATED.3IONS-SCNC: 8.4 MMOL/L (ref 5–15)
AST SERPL-CCNC: 12 U/L (ref 1–40)
BILIRUB SERPL-MCNC: 0.8 MG/DL (ref 0–1.2)
BILIRUB UR QL STRIP: NEGATIVE
BUN SERPL-MCNC: 16 MG/DL (ref 8–23)
BUN/CREAT SERPL: 19.8 (ref 7–25)
CALCIUM SPEC-SCNC: 9.2 MG/DL (ref 8.6–10.5)
CHLORIDE SERPL-SCNC: 101 MMOL/L (ref 98–107)
CHOLEST SERPL-MCNC: 118 MG/DL (ref 0–200)
CLARITY UR: CLEAR
CO2 SERPL-SCNC: 29.6 MMOL/L (ref 22–29)
COLOR UR: YELLOW
CREAT SERPL-MCNC: 0.81 MG/DL (ref 0.76–1.27)
GFR SERPL CREATININE-BSD FRML MDRD: 94 ML/MIN/1.73
GLOBULIN UR ELPH-MCNC: 2.1 GM/DL
GLUCOSE SERPL-MCNC: 111 MG/DL (ref 65–99)
GLUCOSE UR STRIP-MCNC: ABNORMAL MG/DL
HBA1C MFR BLD: 5.7 % (ref 4.8–5.6)
HDLC SERPL-MCNC: 44 MG/DL (ref 40–60)
HGB UR QL STRIP.AUTO: NEGATIVE
KETONES UR QL STRIP: ABNORMAL
LDLC SERPL CALC-MCNC: 51 MG/DL (ref 0–100)
LDLC/HDLC SERPL: 1.16 {RATIO}
LEUKOCYTE ESTERASE UR QL STRIP.AUTO: NEGATIVE
NITRITE UR QL STRIP: NEGATIVE
PH UR STRIP.AUTO: 6 [PH] (ref 5–8)
POTASSIUM SERPL-SCNC: 4.4 MMOL/L (ref 3.5–5.2)
PROT SERPL-MCNC: 6.3 G/DL (ref 6–8.5)
PROT UR QL STRIP: ABNORMAL
SODIUM SERPL-SCNC: 139 MMOL/L (ref 136–145)
SP GR UR STRIP: >=1.03 (ref 1–1.03)
TRIGL SERPL-MCNC: 114 MG/DL (ref 0–150)
UROBILINOGEN UR QL STRIP: ABNORMAL
VLDLC SERPL-MCNC: 22.8 MG/DL

## 2020-08-21 PROCEDURE — 81003 URINALYSIS AUTO W/O SCOPE: CPT | Performed by: INTERNAL MEDICINE

## 2020-08-21 PROCEDURE — 36415 COLL VENOUS BLD VENIPUNCTURE: CPT

## 2020-08-21 PROCEDURE — 83036 HEMOGLOBIN GLYCOSYLATED A1C: CPT | Performed by: INTERNAL MEDICINE

## 2020-08-21 PROCEDURE — 80053 COMPREHEN METABOLIC PANEL: CPT | Performed by: INTERNAL MEDICINE

## 2020-08-21 PROCEDURE — 80061 LIPID PANEL: CPT | Performed by: INTERNAL MEDICINE

## 2020-11-23 ENCOUNTER — TRANSCRIBE ORDERS (OUTPATIENT)
Dept: ADMINISTRATIVE | Facility: HOSPITAL | Age: 70
End: 2020-11-23

## 2020-11-23 ENCOUNTER — LAB (OUTPATIENT)
Dept: LAB | Facility: HOSPITAL | Age: 70
End: 2020-11-23

## 2020-11-23 DIAGNOSIS — I10 ESSENTIAL HYPERTENSION, MALIGNANT: ICD-10-CM

## 2020-11-23 DIAGNOSIS — E11.9 DIABETES MELLITUS WITHOUT COMPLICATION (HCC): Primary | ICD-10-CM

## 2020-11-23 DIAGNOSIS — E11.9 DIABETES MELLITUS WITHOUT COMPLICATION (HCC): ICD-10-CM

## 2020-11-23 DIAGNOSIS — E78.5 HYPERLIPIDEMIA, UNSPECIFIED HYPERLIPIDEMIA TYPE: ICD-10-CM

## 2020-11-23 LAB
ALBUMIN SERPL-MCNC: 4 G/DL (ref 3.5–5.2)
ALBUMIN/GLOB SERPL: 2 G/DL
ALP SERPL-CCNC: 73 U/L (ref 39–117)
ALT SERPL W P-5'-P-CCNC: 13 U/L (ref 1–41)
ANION GAP SERPL CALCULATED.3IONS-SCNC: 8.3 MMOL/L (ref 5–15)
AST SERPL-CCNC: 16 U/L (ref 1–40)
BILIRUB SERPL-MCNC: 0.4 MG/DL (ref 0–1.2)
BILIRUB UR QL STRIP: NEGATIVE
BUN SERPL-MCNC: 16 MG/DL (ref 8–23)
BUN/CREAT SERPL: 20.8 (ref 7–25)
CALCIUM SPEC-SCNC: 9.3 MG/DL (ref 8.6–10.5)
CHLORIDE SERPL-SCNC: 101 MMOL/L (ref 98–107)
CHOLEST SERPL-MCNC: 107 MG/DL (ref 0–200)
CLARITY UR: CLEAR
CO2 SERPL-SCNC: 31.7 MMOL/L (ref 22–29)
COLOR UR: ABNORMAL
CREAT SERPL-MCNC: 0.77 MG/DL (ref 0.76–1.27)
GFR SERPL CREATININE-BSD FRML MDRD: 99 ML/MIN/1.73
GLOBULIN UR ELPH-MCNC: 2 GM/DL
GLUCOSE SERPL-MCNC: 107 MG/DL (ref 65–99)
GLUCOSE UR STRIP-MCNC: ABNORMAL MG/DL
HBA1C MFR BLD: 5.82 % (ref 4.8–5.6)
HDLC SERPL-MCNC: 39 MG/DL (ref 40–60)
HGB UR QL STRIP.AUTO: NEGATIVE
KETONES UR QL STRIP: ABNORMAL
LDLC SERPL CALC-MCNC: 45 MG/DL (ref 0–100)
LDLC/HDLC SERPL: 1.08 {RATIO}
LEUKOCYTE ESTERASE UR QL STRIP.AUTO: NEGATIVE
NITRITE UR QL STRIP: NEGATIVE
PH UR STRIP.AUTO: 7 [PH] (ref 5–8)
POTASSIUM SERPL-SCNC: 4.1 MMOL/L (ref 3.5–5.2)
PROT SERPL-MCNC: 6 G/DL (ref 6–8.5)
PROT UR QL STRIP: ABNORMAL
SODIUM SERPL-SCNC: 141 MMOL/L (ref 136–145)
SP GR UR STRIP: >=1.03 (ref 1–1.03)
TRIGL SERPL-MCNC: 130 MG/DL (ref 0–150)
UROBILINOGEN UR QL STRIP: ABNORMAL
VLDLC SERPL-MCNC: 23 MG/DL (ref 5–40)

## 2020-11-23 PROCEDURE — 83036 HEMOGLOBIN GLYCOSYLATED A1C: CPT

## 2020-11-23 PROCEDURE — 81003 URINALYSIS AUTO W/O SCOPE: CPT

## 2020-11-23 PROCEDURE — 80061 LIPID PANEL: CPT

## 2020-11-23 PROCEDURE — 36415 COLL VENOUS BLD VENIPUNCTURE: CPT

## 2020-11-23 PROCEDURE — 80053 COMPREHEN METABOLIC PANEL: CPT

## 2020-12-03 ENCOUNTER — HOSPITAL ENCOUNTER (OUTPATIENT)
Dept: GENERAL RADIOLOGY | Facility: HOSPITAL | Age: 70
Discharge: HOME | End: 2020-12-03
Admitting: INTERNAL MEDICINE

## 2020-12-03 ENCOUNTER — TRANSCRIBE ORDERS (OUTPATIENT)
Dept: ADMINISTRATIVE | Facility: HOSPITAL | Age: 70
End: 2020-12-03

## 2020-12-03 DIAGNOSIS — M79.671 RIGHT FOOT PAIN: Primary | ICD-10-CM

## 2020-12-03 DIAGNOSIS — M79.671 RIGHT FOOT PAIN: ICD-10-CM

## 2020-12-03 PROCEDURE — 73630 X-RAY EXAM OF FOOT: CPT

## 2021-01-18 ENCOUNTER — TELEPHONE (OUTPATIENT)
Dept: CARDIOLOGY | Facility: CLINIC | Age: 71
End: 2021-01-18

## 2021-01-18 NOTE — TELEPHONE ENCOUNTER
Kristi can you please change patients appt to a telephone visit    Malathi Patient will be doing a telephone visit  On 2/5/21\      Thank you

## 2021-02-05 ENCOUNTER — TELEPHONE (OUTPATIENT)
Dept: CARDIOLOGY | Facility: CLINIC | Age: 71
End: 2021-02-05

## 2021-02-05 NOTE — TELEPHONE ENCOUNTER
Patient was unable to have pacemaker checked today, the battery reader is dead. [employer] will send him a replacement. He will call back and let us know and R/S Pacemaker and follow up appt .    Lizeth will you please cancel his appt for today.

## 2021-03-05 ENCOUNTER — LAB (OUTPATIENT)
Dept: LAB | Facility: HOSPITAL | Age: 71
End: 2021-03-05

## 2021-03-05 ENCOUNTER — TRANSCRIBE ORDERS (OUTPATIENT)
Dept: ADMINISTRATIVE | Facility: HOSPITAL | Age: 71
End: 2021-03-05

## 2021-03-05 DIAGNOSIS — E11.9 DIABETES MELLITUS WITHOUT COMPLICATION (HCC): ICD-10-CM

## 2021-03-05 DIAGNOSIS — E78.5 HYPERLIPIDEMIA, UNSPECIFIED HYPERLIPIDEMIA TYPE: ICD-10-CM

## 2021-03-05 DIAGNOSIS — I10 ESSENTIAL HYPERTENSION, MALIGNANT: ICD-10-CM

## 2021-03-05 DIAGNOSIS — Z00.00 ROUTINE GENERAL MEDICAL EXAMINATION AT A HEALTH CARE FACILITY: Primary | ICD-10-CM

## 2021-03-05 DIAGNOSIS — Z00.00 ROUTINE GENERAL MEDICAL EXAMINATION AT A HEALTH CARE FACILITY: ICD-10-CM

## 2021-03-05 LAB
ALBUMIN SERPL-MCNC: 3.9 G/DL (ref 3.5–5.2)
ALBUMIN/GLOB SERPL: 1.7 G/DL
ALP SERPL-CCNC: 75 U/L (ref 39–117)
ALT SERPL W P-5'-P-CCNC: 11 U/L (ref 1–41)
ANION GAP SERPL CALCULATED.3IONS-SCNC: 9.5 MMOL/L (ref 5–15)
AST SERPL-CCNC: 14 U/L (ref 1–40)
BASOPHILS # BLD AUTO: 0.04 10*3/MM3 (ref 0–0.2)
BASOPHILS NFR BLD AUTO: 0.6 % (ref 0–1.5)
BILIRUB SERPL-MCNC: 0.5 MG/DL (ref 0–1.2)
BILIRUB UR QL STRIP: NEGATIVE
BUN SERPL-MCNC: 17 MG/DL (ref 8–23)
BUN/CREAT SERPL: 21 (ref 7–25)
CALCIUM SPEC-SCNC: 8.9 MG/DL (ref 8.6–10.5)
CHLORIDE SERPL-SCNC: 99 MMOL/L (ref 98–107)
CHOLEST SERPL-MCNC: 99 MG/DL (ref 0–200)
CLARITY UR: CLEAR
CO2 SERPL-SCNC: 28.5 MMOL/L (ref 22–29)
COLOR UR: YELLOW
CREAT SERPL-MCNC: 0.81 MG/DL (ref 0.76–1.27)
DEPRECATED RDW RBC AUTO: 41.7 FL (ref 37–54)
EOSINOPHIL # BLD AUTO: 0.17 10*3/MM3 (ref 0–0.4)
EOSINOPHIL NFR BLD AUTO: 2.5 % (ref 0.3–6.2)
ERYTHROCYTE [DISTWIDTH] IN BLOOD BY AUTOMATED COUNT: 13.4 % (ref 12.3–15.4)
GFR SERPL CREATININE-BSD FRML MDRD: 94 ML/MIN/1.73
GLOBULIN UR ELPH-MCNC: 2.3 GM/DL
GLUCOSE SERPL-MCNC: 110 MG/DL (ref 65–99)
GLUCOSE UR STRIP-MCNC: ABNORMAL MG/DL
HCT VFR BLD AUTO: 42.5 % (ref 37.5–51)
HDLC SERPL-MCNC: 38 MG/DL (ref 40–60)
HGB BLD-MCNC: 14.1 G/DL (ref 13–17.7)
HGB UR QL STRIP.AUTO: NEGATIVE
IMM GRANULOCYTES # BLD AUTO: 0.06 10*3/MM3 (ref 0–0.05)
IMM GRANULOCYTES NFR BLD AUTO: 0.9 % (ref 0–0.5)
KETONES UR QL STRIP: ABNORMAL
LDLC SERPL CALC-MCNC: 38 MG/DL (ref 0–100)
LDLC/HDLC SERPL: 0.92 {RATIO}
LEUKOCYTE ESTERASE UR QL STRIP.AUTO: NEGATIVE
LYMPHOCYTES # BLD AUTO: 1.28 10*3/MM3 (ref 0.7–3.1)
LYMPHOCYTES NFR BLD AUTO: 18.8 % (ref 19.6–45.3)
MCH RBC QN AUTO: 28.2 PG (ref 26.6–33)
MCHC RBC AUTO-ENTMCNC: 33.2 G/DL (ref 31.5–35.7)
MCV RBC AUTO: 85 FL (ref 79–97)
MONOCYTES # BLD AUTO: 0.69 10*3/MM3 (ref 0.1–0.9)
MONOCYTES NFR BLD AUTO: 10.1 % (ref 5–12)
NEUTROPHILS NFR BLD AUTO: 4.56 10*3/MM3 (ref 1.7–7)
NEUTROPHILS NFR BLD AUTO: 67.1 % (ref 42.7–76)
NITRITE UR QL STRIP: NEGATIVE
NRBC BLD AUTO-RTO: 0 /100 WBC (ref 0–0.2)
PH UR STRIP.AUTO: 5.5 [PH] (ref 5–8)
PLATELET # BLD AUTO: 213 10*3/MM3 (ref 140–450)
PMV BLD AUTO: 9.4 FL (ref 6–12)
POTASSIUM SERPL-SCNC: 3.7 MMOL/L (ref 3.5–5.2)
PROT SERPL-MCNC: 6.2 G/DL (ref 6–8.5)
PROT UR QL STRIP: NEGATIVE
RBC # BLD AUTO: 5 10*6/MM3 (ref 4.14–5.8)
SODIUM SERPL-SCNC: 137 MMOL/L (ref 136–145)
SP GR UR STRIP: >=1.03 (ref 1–1.03)
TRIGL SERPL-MCNC: 131 MG/DL (ref 0–150)
UROBILINOGEN UR QL STRIP: ABNORMAL
VLDLC SERPL-MCNC: 23 MG/DL (ref 5–40)
WBC # BLD AUTO: 6.8 10*3/MM3 (ref 3.4–10.8)

## 2021-03-05 PROCEDURE — 80061 LIPID PANEL: CPT

## 2021-03-05 PROCEDURE — 81003 URINALYSIS AUTO W/O SCOPE: CPT

## 2021-03-05 PROCEDURE — 85025 COMPLETE CBC W/AUTO DIFF WBC: CPT

## 2021-03-05 PROCEDURE — 36415 COLL VENOUS BLD VENIPUNCTURE: CPT

## 2021-03-05 PROCEDURE — 80053 COMPREHEN METABOLIC PANEL: CPT

## 2021-04-22 ENCOUNTER — TRANSCRIBE ORDERS (OUTPATIENT)
Dept: SLEEP MEDICINE | Facility: HOSPITAL | Age: 71
End: 2021-04-22

## 2021-04-22 DIAGNOSIS — Z01.818 OTHER SPECIFIED PRE-OPERATIVE EXAMINATION: Primary | ICD-10-CM

## 2021-04-23 ENCOUNTER — LAB (OUTPATIENT)
Dept: LAB | Facility: HOSPITAL | Age: 71
End: 2021-04-23

## 2021-04-23 DIAGNOSIS — Z01.818 OTHER SPECIFIED PRE-OPERATIVE EXAMINATION: ICD-10-CM

## 2021-04-23 PROCEDURE — U0004 COV-19 TEST NON-CDC HGH THRU: HCPCS

## 2021-04-23 PROCEDURE — C9803 HOPD COVID-19 SPEC COLLECT: HCPCS

## 2021-04-23 PROCEDURE — U0005 INFEC AGEN DETEC AMPLI PROBE: HCPCS

## 2021-04-23 RX ORDER — ROSUVASTATIN CALCIUM 10 MG/1
10 TABLET, COATED ORAL DAILY
COMMUNITY

## 2021-04-24 LAB — SARS-COV-2 ORF1AB RESP QL NAA+PROBE: NOT DETECTED

## 2021-04-26 ENCOUNTER — ANESTHESIA EVENT (OUTPATIENT)
Dept: GASTROENTEROLOGY | Facility: HOSPITAL | Age: 71
End: 2021-04-26

## 2021-04-26 ENCOUNTER — HOSPITAL ENCOUNTER (OUTPATIENT)
Facility: HOSPITAL | Age: 71
Setting detail: HOSPITAL OUTPATIENT SURGERY
Discharge: HOME | End: 2021-04-26
Attending: SURGERY | Admitting: SURGERY

## 2021-04-26 ENCOUNTER — ANESTHESIA (OUTPATIENT)
Dept: GASTROENTEROLOGY | Facility: HOSPITAL | Age: 71
End: 2021-04-26

## 2021-04-26 VITALS
RESPIRATION RATE: 16 BRPM | WEIGHT: 188 LBS | HEART RATE: 79 BPM | HEIGHT: 69 IN | OXYGEN SATURATION: 97 % | SYSTOLIC BLOOD PRESSURE: 114 MMHG | BODY MASS INDEX: 27.85 KG/M2 | DIASTOLIC BLOOD PRESSURE: 72 MMHG

## 2021-04-26 DIAGNOSIS — K21.9 GERD (GASTROESOPHAGEAL REFLUX DISEASE): ICD-10-CM

## 2021-04-26 DIAGNOSIS — Z86.010 HISTORY OF COLON POLYPS: ICD-10-CM

## 2021-04-26 LAB — GLUCOSE BLDC GLUCOMTR-MCNC: 128 MG/DL (ref 70–130)

## 2021-04-26 PROCEDURE — 25010000002 PROPOFOL 10 MG/ML EMULSION: Performed by: ANESTHESIOLOGY

## 2021-04-26 PROCEDURE — 88305 TISSUE EXAM BY PATHOLOGIST: CPT | Performed by: SURGERY

## 2021-04-26 PROCEDURE — 82962 GLUCOSE BLOOD TEST: CPT

## 2021-04-26 RX ORDER — PROPOFOL 10 MG/ML
VIAL (ML) INTRAVENOUS CONTINUOUS PRN
Status: DISCONTINUED | OUTPATIENT
Start: 2021-04-26 | End: 2021-04-26 | Stop reason: SURG

## 2021-04-26 RX ORDER — SODIUM CHLORIDE 0.9 % (FLUSH) 0.9 %
3 SYRINGE (ML) INJECTION EVERY 12 HOURS SCHEDULED
Status: DISCONTINUED | OUTPATIENT
Start: 2021-04-26 | End: 2021-04-26 | Stop reason: HOSPADM

## 2021-04-26 RX ORDER — PROPOFOL 10 MG/ML
VIAL (ML) INTRAVENOUS AS NEEDED
Status: DISCONTINUED | OUTPATIENT
Start: 2021-04-26 | End: 2021-04-26 | Stop reason: SURG

## 2021-04-26 RX ORDER — LIDOCAINE HYDROCHLORIDE 20 MG/ML
INJECTION, SOLUTION INFILTRATION; PERINEURAL AS NEEDED
Status: DISCONTINUED | OUTPATIENT
Start: 2021-04-26 | End: 2021-04-26 | Stop reason: SURG

## 2021-04-26 RX ORDER — SODIUM CHLORIDE 0.9 % (FLUSH) 0.9 %
10 SYRINGE (ML) INJECTION AS NEEDED
Status: DISCONTINUED | OUTPATIENT
Start: 2021-04-26 | End: 2021-04-26 | Stop reason: HOSPADM

## 2021-04-26 RX ORDER — SODIUM CHLORIDE, SODIUM LACTATE, POTASSIUM CHLORIDE, CALCIUM CHLORIDE 600; 310; 30; 20 MG/100ML; MG/100ML; MG/100ML; MG/100ML
30 INJECTION, SOLUTION INTRAVENOUS CONTINUOUS PRN
Status: DISCONTINUED | OUTPATIENT
Start: 2021-04-26 | End: 2021-04-26 | Stop reason: HOSPADM

## 2021-04-26 RX ADMIN — PROPOFOL 75 MG: 10 INJECTION, EMULSION INTRAVENOUS at 08:59

## 2021-04-26 RX ADMIN — PROPOFOL 50 MG: 10 INJECTION, EMULSION INTRAVENOUS at 09:05

## 2021-04-26 RX ADMIN — PROPOFOL 125 MCG/KG/MIN: 10 INJECTION, EMULSION INTRAVENOUS at 09:00

## 2021-04-26 RX ADMIN — LIDOCAINE HYDROCHLORIDE 50 MG: 20 INJECTION, SOLUTION INFILTRATION; PERINEURAL at 08:59

## 2021-04-26 RX ADMIN — SODIUM CHLORIDE, POTASSIUM CHLORIDE, SODIUM LACTATE AND CALCIUM CHLORIDE 30 ML/HR: 600; 310; 30; 20 INJECTION, SOLUTION INTRAVENOUS at 08:49

## 2021-04-26 NOTE — ANESTHESIA POSTPROCEDURE EVALUATION
Patient: Alfredo Toure    Procedure Summary     Date: 04/26/21 Room / Location:  [facility]    Anesthesia Start: 0856 Anesthesia Stop: 0926    Procedures:       COLONOSCOPY to cecum with cold polypectomy (N/A )      ESOPHAGOGASTRODUODENOSCOPY with biopsies (N/A Esophagus) Diagnosis:     Surgeons: Zbigniew Washington MD Provider: Jonny Ramos MD    Anesthesia Type: MAC ASA Status: 3          Anesthesia Type: MAC    Vitals  Vitals Value Taken Time   /72 04/26/21 0945   Temp     Pulse 79 04/26/21 0945   Resp 16 04/26/21 0945   SpO2 97 % 04/26/21 0945           Post Anesthesia Care and Evaluation    Patient location during evaluation: PACU  Patient participation: complete - patient participated  Level of consciousness: awake  Pain score: 1  Pain management: adequate  Airway patency: patent  Anesthetic complications: No anesthetic complications  PONV Status: none  Cardiovascular status: acceptable  Respiratory status: acceptable  Hydration status: acceptable

## 2021-04-26 NOTE — ANESTHESIA PREPROCEDURE EVALUATION
Anesthesia Evaluation     Patient summary reviewed                Airway   No difficulty expected  Dental      Pulmonary    (+) shortness of breath,   Cardiovascular     ECG reviewed  Rhythm: regular    (+) pacemaker pacemaker, hypertension, valvular problems/murmurs AS, dysrhythmias,       Neuro/Psych  GI/Hepatic/Renal/Endo    (+)   diabetes mellitus,     Musculoskeletal     Abdominal    Substance History      OB/GYN          Other                      Anesthesia Plan    ASA 3     MAC       Anesthetic plan, all risks, benefits, and alternatives have been provided, discussed and informed consent has been obtained with: patient.

## 2021-04-27 LAB
LAB AP CASE REPORT: NORMAL
PATH REPORT.FINAL DX SPEC: NORMAL
PATH REPORT.GROSS SPEC: NORMAL

## 2021-06-18 ENCOUNTER — LAB (OUTPATIENT)
Dept: LAB | Facility: HOSPITAL | Age: 71
End: 2021-06-18

## 2021-06-18 ENCOUNTER — TRANSCRIBE ORDERS (OUTPATIENT)
Dept: ADMINISTRATIVE | Facility: HOSPITAL | Age: 71
End: 2021-06-18

## 2021-06-18 DIAGNOSIS — I10 ESSENTIAL HYPERTENSION, MALIGNANT: ICD-10-CM

## 2021-06-18 DIAGNOSIS — E78.5 HYPERLIPIDEMIA, UNSPECIFIED HYPERLIPIDEMIA TYPE: ICD-10-CM

## 2021-06-18 DIAGNOSIS — E11.9 DIABETES MELLITUS WITHOUT COMPLICATION (HCC): ICD-10-CM

## 2021-06-18 DIAGNOSIS — E11.9 DIABETES MELLITUS WITHOUT COMPLICATION (HCC): Primary | ICD-10-CM

## 2021-06-18 LAB
ALBUMIN SERPL-MCNC: 4.2 G/DL (ref 3.5–5.2)
ALBUMIN/GLOB SERPL: 1.8 G/DL
ALP SERPL-CCNC: 78 U/L (ref 39–117)
ALT SERPL W P-5'-P-CCNC: 10 U/L (ref 1–41)
ANION GAP SERPL CALCULATED.3IONS-SCNC: 9.5 MMOL/L (ref 5–15)
AST SERPL-CCNC: 11 U/L (ref 1–40)
BILIRUB SERPL-MCNC: 0.5 MG/DL (ref 0–1.2)
BUN SERPL-MCNC: 16 MG/DL (ref 8–23)
BUN/CREAT SERPL: 20 (ref 7–25)
CALCIUM SPEC-SCNC: 9.9 MG/DL (ref 8.6–10.5)
CHLORIDE SERPL-SCNC: 100 MMOL/L (ref 98–107)
CHOLEST SERPL-MCNC: 114 MG/DL (ref 0–200)
CO2 SERPL-SCNC: 30.5 MMOL/L (ref 22–29)
CREAT SERPL-MCNC: 0.8 MG/DL (ref 0.76–1.27)
GFR SERPL CREATININE-BSD FRML MDRD: 95 ML/MIN/1.73
GLOBULIN UR ELPH-MCNC: 2.3 GM/DL
GLUCOSE SERPL-MCNC: 126 MG/DL (ref 65–99)
HBA1C MFR BLD: 5.6 % (ref 4.8–5.6)
HDLC SERPL-MCNC: 46 MG/DL (ref 40–60)
LDLC SERPL CALC-MCNC: 48 MG/DL (ref 0–100)
LDLC/HDLC SERPL: 0.99 {RATIO}
POTASSIUM SERPL-SCNC: 4.5 MMOL/L (ref 3.5–5.2)
PROT SERPL-MCNC: 6.5 G/DL (ref 6–8.5)
SODIUM SERPL-SCNC: 140 MMOL/L (ref 136–145)
TRIGL SERPL-MCNC: 112 MG/DL (ref 0–150)
VLDLC SERPL-MCNC: 20 MG/DL (ref 5–40)

## 2021-06-18 PROCEDURE — 83036 HEMOGLOBIN GLYCOSYLATED A1C: CPT

## 2021-06-18 PROCEDURE — 36415 COLL VENOUS BLD VENIPUNCTURE: CPT

## 2021-06-18 PROCEDURE — 80061 LIPID PANEL: CPT

## 2021-06-18 PROCEDURE — 80053 COMPREHEN METABOLIC PANEL: CPT

## 2021-07-26 ENCOUNTER — RESEARCH ENCOUNTER (OUTPATIENT)
Dept: CARDIOLOGY | Facility: CLINIC | Age: 71
End: 2021-07-26

## 2021-08-12 PROCEDURE — 93294 REM INTERROG EVL PM/LDLS PM: CPT | Performed by: INTERNAL MEDICINE

## 2021-08-12 PROCEDURE — 93296 REM INTERROG EVL PM/IDS: CPT | Performed by: INTERNAL MEDICINE

## 2021-08-24 ENCOUNTER — LAB (OUTPATIENT)
Dept: LAB | Facility: HOSPITAL | Age: 71
End: 2021-08-24

## 2021-08-24 ENCOUNTER — TRANSCRIBE ORDERS (OUTPATIENT)
Dept: ADMINISTRATIVE | Facility: HOSPITAL | Age: 71
End: 2021-08-24

## 2021-08-24 DIAGNOSIS — E78.5 HYPERLIPIDEMIA, UNSPECIFIED HYPERLIPIDEMIA TYPE: ICD-10-CM

## 2021-08-24 DIAGNOSIS — E11.9 DIABETES MELLITUS WITHOUT COMPLICATION (HCC): ICD-10-CM

## 2021-08-24 DIAGNOSIS — E78.5 HYPERLIPIDEMIA, UNSPECIFIED HYPERLIPIDEMIA TYPE: Primary | ICD-10-CM

## 2021-08-24 DIAGNOSIS — I10 ESSENTIAL HYPERTENSION, MALIGNANT: ICD-10-CM

## 2021-08-24 LAB
ALBUMIN SERPL-MCNC: 4.1 G/DL (ref 3.5–5.2)
ALBUMIN/GLOB SERPL: 2.9 G/DL
ALP SERPL-CCNC: 70 U/L (ref 39–117)
ALT SERPL W P-5'-P-CCNC: 11 U/L (ref 1–41)
ANION GAP SERPL CALCULATED.3IONS-SCNC: 10.9 MMOL/L (ref 5–15)
AST SERPL-CCNC: 13 U/L (ref 1–40)
BILIRUB SERPL-MCNC: 0.5 MG/DL (ref 0–1.2)
BILIRUB UR QL STRIP: NEGATIVE
BUN SERPL-MCNC: 14 MG/DL (ref 8–23)
BUN/CREAT SERPL: 20.6 (ref 7–25)
CALCIUM SPEC-SCNC: 9.3 MG/DL (ref 8.6–10.5)
CHLORIDE SERPL-SCNC: 102 MMOL/L (ref 98–107)
CHOLEST SERPL-MCNC: 111 MG/DL (ref 0–200)
CLARITY UR: CLEAR
CO2 SERPL-SCNC: 27.1 MMOL/L (ref 22–29)
COLOR UR: YELLOW
CREAT SERPL-MCNC: 0.68 MG/DL (ref 0.76–1.27)
GFR SERPL CREATININE-BSD FRML MDRD: 115 ML/MIN/1.73
GLOBULIN UR ELPH-MCNC: 1.4 GM/DL
GLUCOSE SERPL-MCNC: 110 MG/DL (ref 65–99)
GLUCOSE UR STRIP-MCNC: ABNORMAL MG/DL
HBA1C MFR BLD: 5.77 % (ref 4.8–5.6)
HDLC SERPL-MCNC: 46 MG/DL (ref 40–60)
HGB UR QL STRIP.AUTO: NEGATIVE
KETONES UR QL STRIP: NEGATIVE
LDLC SERPL CALC-MCNC: 48 MG/DL (ref 0–100)
LDLC/HDLC SERPL: 1.05 {RATIO}
LEUKOCYTE ESTERASE UR QL STRIP.AUTO: NEGATIVE
NITRITE UR QL STRIP: NEGATIVE
PH UR STRIP.AUTO: 5.5 [PH] (ref 5–8)
POTASSIUM SERPL-SCNC: 3.7 MMOL/L (ref 3.5–5.2)
PROT SERPL-MCNC: 5.5 G/DL (ref 6–8.5)
PROT UR QL STRIP: ABNORMAL
SODIUM SERPL-SCNC: 140 MMOL/L (ref 136–145)
SP GR UR STRIP: >=1.03 (ref 1–1.03)
TRIGL SERPL-MCNC: 83 MG/DL (ref 0–150)
UROBILINOGEN UR QL STRIP: ABNORMAL
VLDLC SERPL-MCNC: 17 MG/DL (ref 5–40)

## 2021-08-24 PROCEDURE — 83036 HEMOGLOBIN GLYCOSYLATED A1C: CPT

## 2021-08-24 PROCEDURE — 81003 URINALYSIS AUTO W/O SCOPE: CPT

## 2021-08-24 PROCEDURE — 36415 COLL VENOUS BLD VENIPUNCTURE: CPT

## 2021-08-24 PROCEDURE — 80053 COMPREHEN METABOLIC PANEL: CPT

## 2021-08-24 PROCEDURE — 80061 LIPID PANEL: CPT

## 2021-09-24 ENCOUNTER — CLINICAL SUPPORT NO REQUIREMENTS (OUTPATIENT)
Dept: CARDIOLOGY | Facility: CLINIC | Age: 71
End: 2021-09-24

## 2021-09-24 ENCOUNTER — OFFICE VISIT (OUTPATIENT)
Dept: CARDIOLOGY | Facility: CLINIC | Age: 71
End: 2021-09-24

## 2021-09-24 VITALS
SYSTOLIC BLOOD PRESSURE: 118 MMHG | HEART RATE: 75 BPM | BODY MASS INDEX: 29.21 KG/M2 | WEIGHT: 197.2 LBS | HEIGHT: 69 IN | DIASTOLIC BLOOD PRESSURE: 80 MMHG

## 2021-09-24 DIAGNOSIS — I44.1 SECOND DEGREE HEART BLOCK: Primary | ICD-10-CM

## 2021-09-24 DIAGNOSIS — Z95.0 HISTORY OF PERMANENT CARDIAC PACEMAKER PLACEMENT: ICD-10-CM

## 2021-09-24 DIAGNOSIS — I45.10 RBBB: ICD-10-CM

## 2021-09-24 DIAGNOSIS — E11.8 TYPE II DIABETES MELLITUS WITH COMPLICATION (HCC): ICD-10-CM

## 2021-09-24 PROCEDURE — 93280 PM DEVICE PROGR EVAL DUAL: CPT | Performed by: INTERNAL MEDICINE

## 2021-09-24 PROCEDURE — 99214 OFFICE O/P EST MOD 30 MIN: CPT | Performed by: INTERNAL MEDICINE

## 2021-09-24 PROCEDURE — 93000 ELECTROCARDIOGRAM COMPLETE: CPT | Performed by: INTERNAL MEDICINE

## 2021-11-11 PROCEDURE — 93294 REM INTERROG EVL PM/LDLS PM: CPT | Performed by: INTERNAL MEDICINE

## 2021-11-11 PROCEDURE — 93296 REM INTERROG EVL PM/IDS: CPT | Performed by: INTERNAL MEDICINE

## 2021-11-22 ENCOUNTER — LAB (OUTPATIENT)
Dept: LAB | Facility: HOSPITAL | Age: 71
End: 2021-11-22

## 2021-11-22 ENCOUNTER — TRANSCRIBE ORDERS (OUTPATIENT)
Dept: ADMINISTRATIVE | Facility: HOSPITAL | Age: 71
End: 2021-11-22

## 2021-11-22 DIAGNOSIS — E11.9 DIABETES MELLITUS WITHOUT COMPLICATION (HCC): ICD-10-CM

## 2021-11-22 DIAGNOSIS — I10 ESSENTIAL HYPERTENSION, MALIGNANT: ICD-10-CM

## 2021-11-22 DIAGNOSIS — E78.5 HYPERLIPIDEMIA, UNSPECIFIED HYPERLIPIDEMIA TYPE: ICD-10-CM

## 2021-11-22 DIAGNOSIS — E78.5 HYPERLIPIDEMIA, UNSPECIFIED HYPERLIPIDEMIA TYPE: Primary | ICD-10-CM

## 2021-11-22 LAB
ALBUMIN SERPL-MCNC: 4.5 G/DL (ref 3.5–5.2)
ALBUMIN/GLOB SERPL: 2 G/DL
ALP SERPL-CCNC: 82 U/L (ref 39–117)
ALT SERPL W P-5'-P-CCNC: 16 U/L (ref 1–41)
ANION GAP SERPL CALCULATED.3IONS-SCNC: 8.9 MMOL/L (ref 5–15)
AST SERPL-CCNC: 18 U/L (ref 1–40)
BILIRUB SERPL-MCNC: 0.5 MG/DL (ref 0–1.2)
BILIRUB UR QL STRIP: NEGATIVE
BUN SERPL-MCNC: 13 MG/DL (ref 8–23)
BUN/CREAT SERPL: 14.9 (ref 7–25)
CALCIUM SPEC-SCNC: 9.3 MG/DL (ref 8.6–10.5)
CHLORIDE SERPL-SCNC: 103 MMOL/L (ref 98–107)
CHOLEST SERPL-MCNC: 111 MG/DL (ref 0–200)
CLARITY UR: CLEAR
CO2 SERPL-SCNC: 30.1 MMOL/L (ref 22–29)
COLOR UR: YELLOW
CREAT SERPL-MCNC: 0.87 MG/DL (ref 0.76–1.27)
GFR SERPL CREATININE-BSD FRML MDRD: 86 ML/MIN/1.73
GLOBULIN UR ELPH-MCNC: 2.2 GM/DL
GLUCOSE SERPL-MCNC: 126 MG/DL (ref 65–99)
GLUCOSE UR STRIP-MCNC: ABNORMAL MG/DL
HBA1C MFR BLD: 5.94 % (ref 4.8–5.6)
HDLC SERPL-MCNC: 49 MG/DL (ref 40–60)
HGB UR QL STRIP.AUTO: NEGATIVE
KETONES UR QL STRIP: NEGATIVE
LDLC SERPL CALC-MCNC: 44 MG/DL (ref 0–100)
LDLC/HDLC SERPL: 0.89 {RATIO}
LEUKOCYTE ESTERASE UR QL STRIP.AUTO: NEGATIVE
NITRITE UR QL STRIP: NEGATIVE
PH UR STRIP.AUTO: 7 [PH] (ref 5–8)
POTASSIUM SERPL-SCNC: 4.2 MMOL/L (ref 3.5–5.2)
PROT SERPL-MCNC: 6.7 G/DL (ref 6–8.5)
PROT UR QL STRIP: NEGATIVE
SODIUM SERPL-SCNC: 142 MMOL/L (ref 136–145)
SP GR UR STRIP: >=1.03 (ref 1–1.03)
TRIGL SERPL-MCNC: 91 MG/DL (ref 0–150)
UROBILINOGEN UR QL STRIP: ABNORMAL
VLDLC SERPL-MCNC: 18 MG/DL (ref 5–40)

## 2021-11-22 PROCEDURE — 80053 COMPREHEN METABOLIC PANEL: CPT

## 2021-11-22 PROCEDURE — 80061 LIPID PANEL: CPT

## 2021-11-22 PROCEDURE — 83036 HEMOGLOBIN GLYCOSYLATED A1C: CPT

## 2021-11-22 PROCEDURE — 36415 COLL VENOUS BLD VENIPUNCTURE: CPT

## 2021-11-22 PROCEDURE — 81003 URINALYSIS AUTO W/O SCOPE: CPT

## 2022-02-01 ENCOUNTER — TELEPHONE (OUTPATIENT)
Dept: CARDIOLOGY | Facility: CLINIC | Age: 72
End: 2022-02-01

## 2022-02-01 NOTE — TELEPHONE ENCOUNTER
11/29/21 1 labeled NSVT episode, 7 beats long @ rate 206 BPM, 1 second for 1 second    Unable to determine from the stored EGM if this is true VT or  more suggestive of  RV oversensing.    This event is new for patient.

## 2022-02-10 PROCEDURE — 93296 REM INTERROG EVL PM/IDS: CPT | Performed by: INTERNAL MEDICINE

## 2022-02-10 PROCEDURE — 93294 REM INTERROG EVL PM/LDLS PM: CPT | Performed by: INTERNAL MEDICINE

## 2022-03-16 ENCOUNTER — LAB (OUTPATIENT)
Dept: LAB | Facility: HOSPITAL | Age: 72
End: 2022-03-16

## 2022-03-16 ENCOUNTER — TRANSCRIBE ORDERS (OUTPATIENT)
Dept: ADMINISTRATIVE | Facility: HOSPITAL | Age: 72
End: 2022-03-16

## 2022-03-16 DIAGNOSIS — E78.5 HYPERLIPIDEMIA, UNSPECIFIED HYPERLIPIDEMIA TYPE: ICD-10-CM

## 2022-03-16 DIAGNOSIS — Z00.00 ROUTINE GENERAL MEDICAL EXAMINATION AT A HEALTH CARE FACILITY: Primary | ICD-10-CM

## 2022-03-16 DIAGNOSIS — Z00.00 ROUTINE GENERAL MEDICAL EXAMINATION AT A HEALTH CARE FACILITY: ICD-10-CM

## 2022-03-16 DIAGNOSIS — E11.9 DIABETES MELLITUS WITHOUT COMPLICATION: ICD-10-CM

## 2022-03-16 DIAGNOSIS — I10 ESSENTIAL HYPERTENSION, MALIGNANT: ICD-10-CM

## 2022-03-16 LAB
ALBUMIN SERPL-MCNC: 4.3 G/DL (ref 3.5–5.2)
ALBUMIN/GLOB SERPL: 1.9 G/DL
ALP SERPL-CCNC: 89 U/L (ref 39–117)
ALT SERPL W P-5'-P-CCNC: 13 U/L (ref 1–41)
ANION GAP SERPL CALCULATED.3IONS-SCNC: 8 MMOL/L (ref 5–15)
AST SERPL-CCNC: 13 U/L (ref 1–40)
BASOPHILS # BLD AUTO: 0.05 10*3/MM3 (ref 0–0.2)
BASOPHILS NFR BLD AUTO: 0.7 % (ref 0–1.5)
BILIRUB SERPL-MCNC: 0.4 MG/DL (ref 0–1.2)
BILIRUB UR QL STRIP: NEGATIVE
BUN SERPL-MCNC: 16 MG/DL (ref 8–23)
BUN/CREAT SERPL: 17.4 (ref 7–25)
CALCIUM SPEC-SCNC: 9.6 MG/DL (ref 8.6–10.5)
CHLORIDE SERPL-SCNC: 99 MMOL/L (ref 98–107)
CHOLEST SERPL-MCNC: 127 MG/DL (ref 0–200)
CLARITY UR: CLEAR
CO2 SERPL-SCNC: 31 MMOL/L (ref 22–29)
COLOR UR: YELLOW
CREAT SERPL-MCNC: 0.92 MG/DL (ref 0.76–1.27)
DEPRECATED RDW RBC AUTO: 41.5 FL (ref 37–54)
EGFRCR SERPLBLD CKD-EPI 2021: 87.8 ML/MIN/1.73
EOSINOPHIL # BLD AUTO: 0.2 10*3/MM3 (ref 0–0.4)
EOSINOPHIL NFR BLD AUTO: 2.6 % (ref 0.3–6.2)
ERYTHROCYTE [DISTWIDTH] IN BLOOD BY AUTOMATED COUNT: 13.6 % (ref 12.3–15.4)
GLOBULIN UR ELPH-MCNC: 2.3 GM/DL
GLUCOSE SERPL-MCNC: 137 MG/DL (ref 65–99)
GLUCOSE UR STRIP-MCNC: ABNORMAL MG/DL
HBA1C MFR BLD: 6.1 % (ref 4.8–5.6)
HCT VFR BLD AUTO: 45.3 % (ref 37.5–51)
HDLC SERPL-MCNC: 39 MG/DL (ref 40–60)
HGB BLD-MCNC: 15.3 G/DL (ref 13–17.7)
HGB UR QL STRIP.AUTO: NEGATIVE
IMM GRANULOCYTES # BLD AUTO: 0.08 10*3/MM3 (ref 0–0.05)
IMM GRANULOCYTES NFR BLD AUTO: 1.1 % (ref 0–0.5)
KETONES UR QL STRIP: NEGATIVE
LDLC SERPL CALC-MCNC: 58 MG/DL (ref 0–100)
LDLC/HDLC SERPL: 1.33 {RATIO}
LEUKOCYTE ESTERASE UR QL STRIP.AUTO: NEGATIVE
LYMPHOCYTES # BLD AUTO: 1.39 10*3/MM3 (ref 0.7–3.1)
LYMPHOCYTES NFR BLD AUTO: 18.3 % (ref 19.6–45.3)
MCH RBC QN AUTO: 28.8 PG (ref 26.6–33)
MCHC RBC AUTO-ENTMCNC: 33.8 G/DL (ref 31.5–35.7)
MCV RBC AUTO: 85.2 FL (ref 79–97)
MONOCYTES # BLD AUTO: 0.6 10*3/MM3 (ref 0.1–0.9)
MONOCYTES NFR BLD AUTO: 7.9 % (ref 5–12)
NEUTROPHILS NFR BLD AUTO: 5.28 10*3/MM3 (ref 1.7–7)
NEUTROPHILS NFR BLD AUTO: 69.4 % (ref 42.7–76)
NITRITE UR QL STRIP: NEGATIVE
NRBC BLD AUTO-RTO: 0 /100 WBC (ref 0–0.2)
PH UR STRIP.AUTO: 5.5 [PH] (ref 5–8)
PLATELET # BLD AUTO: 249 10*3/MM3 (ref 140–450)
PMV BLD AUTO: 9.3 FL (ref 6–12)
POTASSIUM SERPL-SCNC: 4.4 MMOL/L (ref 3.5–5.2)
PROT SERPL-MCNC: 6.6 G/DL (ref 6–8.5)
PROT UR QL STRIP: NEGATIVE
RBC # BLD AUTO: 5.32 10*6/MM3 (ref 4.14–5.8)
SODIUM SERPL-SCNC: 138 MMOL/L (ref 136–145)
SP GR UR STRIP: >=1.03 (ref 1–1.03)
TRIGL SERPL-MCNC: 181 MG/DL (ref 0–150)
UROBILINOGEN UR QL STRIP: ABNORMAL
VLDLC SERPL-MCNC: 30 MG/DL (ref 5–40)
WBC NRBC COR # BLD: 7.6 10*3/MM3 (ref 3.4–10.8)

## 2022-03-16 PROCEDURE — 85025 COMPLETE CBC W/AUTO DIFF WBC: CPT

## 2022-03-16 PROCEDURE — 80053 COMPREHEN METABOLIC PANEL: CPT

## 2022-03-16 PROCEDURE — 83036 HEMOGLOBIN GLYCOSYLATED A1C: CPT

## 2022-03-16 PROCEDURE — 80061 LIPID PANEL: CPT

## 2022-03-16 PROCEDURE — 81003 URINALYSIS AUTO W/O SCOPE: CPT

## 2022-03-16 PROCEDURE — 36415 COLL VENOUS BLD VENIPUNCTURE: CPT

## 2022-06-27 ENCOUNTER — LAB (OUTPATIENT)
Dept: LAB | Facility: HOSPITAL | Age: 72
End: 2022-06-27

## 2022-06-27 ENCOUNTER — TRANSCRIBE ORDERS (OUTPATIENT)
Dept: ADMINISTRATIVE | Facility: HOSPITAL | Age: 72
End: 2022-06-27

## 2022-06-27 DIAGNOSIS — E11.9 DIABETES MELLITUS WITHOUT COMPLICATION: ICD-10-CM

## 2022-06-27 DIAGNOSIS — E11.9 DIABETES MELLITUS WITHOUT COMPLICATION: Primary | ICD-10-CM

## 2022-06-27 DIAGNOSIS — E78.5 HYPERLIPIDEMIA, UNSPECIFIED HYPERLIPIDEMIA TYPE: ICD-10-CM

## 2022-06-27 LAB
ALBUMIN SERPL-MCNC: 4 G/DL (ref 3.5–5.2)
ALBUMIN/GLOB SERPL: 1.7 G/DL
ALP SERPL-CCNC: 93 U/L (ref 39–117)
ALT SERPL W P-5'-P-CCNC: 7 U/L (ref 1–41)
ANION GAP SERPL CALCULATED.3IONS-SCNC: 10.4 MMOL/L (ref 5–15)
AST SERPL-CCNC: 10 U/L (ref 1–40)
BILIRUB SERPL-MCNC: 0.6 MG/DL (ref 0–1.2)
BUN SERPL-MCNC: 16 MG/DL (ref 8–23)
BUN/CREAT SERPL: 16 (ref 7–25)
CALCIUM SPEC-SCNC: 10.2 MG/DL (ref 8.6–10.5)
CHLORIDE SERPL-SCNC: 100 MMOL/L (ref 98–107)
CHOLEST SERPL-MCNC: 119 MG/DL (ref 0–200)
CO2 SERPL-SCNC: 27.6 MMOL/L (ref 22–29)
CREAT SERPL-MCNC: 1 MG/DL (ref 0.76–1.27)
EGFRCR SERPLBLD CKD-EPI 2021: 79.5 ML/MIN/1.73
GLOBULIN UR ELPH-MCNC: 2.3 GM/DL
GLUCOSE SERPL-MCNC: 117 MG/DL (ref 65–99)
HBA1C MFR BLD: 6 % (ref 4.8–5.6)
HDLC SERPL-MCNC: 43 MG/DL (ref 40–60)
LDLC SERPL CALC-MCNC: 53 MG/DL (ref 0–100)
LDLC/HDLC SERPL: 1.16 {RATIO}
POTASSIUM SERPL-SCNC: 4 MMOL/L (ref 3.5–5.2)
PROT SERPL-MCNC: 6.3 G/DL (ref 6–8.5)
SODIUM SERPL-SCNC: 138 MMOL/L (ref 136–145)
TRIGL SERPL-MCNC: 130 MG/DL (ref 0–150)
VLDLC SERPL-MCNC: 23 MG/DL (ref 5–40)

## 2022-06-27 PROCEDURE — 83036 HEMOGLOBIN GLYCOSYLATED A1C: CPT

## 2022-06-27 PROCEDURE — 80061 LIPID PANEL: CPT

## 2022-06-27 PROCEDURE — 80053 COMPREHEN METABOLIC PANEL: CPT

## 2022-06-27 PROCEDURE — 36415 COLL VENOUS BLD VENIPUNCTURE: CPT

## 2022-09-28 ENCOUNTER — OFFICE VISIT (OUTPATIENT)
Dept: CARDIOLOGY | Facility: CLINIC | Age: 72
End: 2022-09-28

## 2022-09-28 ENCOUNTER — CLINICAL SUPPORT NO REQUIREMENTS (OUTPATIENT)
Dept: CARDIOLOGY | Facility: CLINIC | Age: 72
End: 2022-09-28

## 2022-09-28 VITALS
DIASTOLIC BLOOD PRESSURE: 72 MMHG | WEIGHT: 198 LBS | SYSTOLIC BLOOD PRESSURE: 128 MMHG | HEIGHT: 69 IN | HEART RATE: 79 BPM | BODY MASS INDEX: 29.33 KG/M2

## 2022-09-28 DIAGNOSIS — I35.0 AORTIC STENOSIS, MODERATE: ICD-10-CM

## 2022-09-28 DIAGNOSIS — Z95.0 HISTORY OF PERMANENT CARDIAC PACEMAKER PLACEMENT: ICD-10-CM

## 2022-09-28 DIAGNOSIS — I44.1 SECOND DEGREE HEART BLOCK: Primary | ICD-10-CM

## 2022-09-28 PROCEDURE — 93280 PM DEVICE PROGR EVAL DUAL: CPT | Performed by: INTERNAL MEDICINE

## 2022-09-28 PROCEDURE — 99214 OFFICE O/P EST MOD 30 MIN: CPT | Performed by: NURSE PRACTITIONER

## 2022-09-28 PROCEDURE — 93000 ELECTROCARDIOGRAM COMPLETE: CPT | Performed by: NURSE PRACTITIONER

## 2022-09-28 NOTE — PROGRESS NOTES
Date of Office Visit: 2022  Encounter Provider: WICHO Lipscomb  Place of Service: [facility] CARDIOLOGY  Patient Name: Alfredo Toure  :1950    Chief Complaint   Patient presents with   • Aortic Stenosis   :     HPI: Alfredo Toure is a 72 y.o. male.  He is a patient of Dr. An's.  In , he developed high degree AV block and underwent placement of a dual-chamber pacemaker.  Echocardiogram at that time demonstrated normal LV function and moderate aortic stenosis.   He was last seen in the office by Dr. An in 2021 at which time he was doing well.  No changes were made to his regimen, and he was advised to follow-up in 1 year.   He has been doing well.  He denies any chest pain, shortness of breath, palpitations, edema, dizziness, or syncope.    Past Medical History:   Diagnosis Date   • Arrhythmia     2ND DEGREE HEART BLOCK   • Colon polyp    • Diabetes mellitus (HCC)    • GERD (gastroesophageal reflux disease)    • Hyperlipidemia    • Hypertension        Past Surgical History:   Procedure Laterality Date   • ABDOMINAL SURGERY      at [facility]   • CARDIAC CATHETERIZATION     • CARDIAC ELECTROPHYSIOLOGY PROCEDURE Left 2/15/2019    Procedure: Pacemaker DC new- [employer];  Surgeon: Zbigniew Tam MD;  Location: [facility] CATH INVASIVE LOCATION;  Service: Cardiology   • CHOLECYSTECTOMY     • COLONOSCOPY     • COLONOSCOPY N/A 2021    Procedure: COLONOSCOPY to cecum with cold polypectomy;  Surgeon: Zbigniew Washington MD;  Location: [facility] ENDOSCOPY;  Service: General;  Laterality: N/A;  Pre: h/x of polyps  Post: diverticulosis. polyp   • ENDOSCOPY N/A 2021    Procedure: ESOPHAGOGASTRODUODENOSCOPY with biopsies;  Surgeon: Zbigniew Washington MD;  Location: [facility] ENDOSCOPY;  Service: General;  Laterality: N/A;  Pre: reflux  Post: mild gastritis, hiatal hernia   • LUMBAR DISCECTOMY         Social History     Socioeconomic  "History   • Marital status:    Tobacco Use   • Smoking status: Never Smoker   • Smokeless tobacco: Never Used   • Tobacco comment: Daily caffeine daily   Vaping Use   • Vaping Use: Never used   Substance and Sexual Activity   • Alcohol use: Yes     Comment: on occasion - monthly.   alcohol is not the problem   • Drug use: No   • Sexual activity: Never       Family History   Problem Relation Age of Onset   • Colon cancer Maternal Uncle    • Malig Hyperthermia Neg Hx        Review of Systems   Constitutional: Negative.   Cardiovascular: Negative.  Negative for chest pain, dyspnea on exertion, leg swelling, orthopnea, paroxysmal nocturnal dyspnea and syncope.   Respiratory: Negative.    Hematologic/Lymphatic: Negative for bleeding problem.   Musculoskeletal: Negative for falls.   Gastrointestinal: Negative for melena.   Neurological: Negative for dizziness and light-headedness.       No Known Allergies      Current Outpatient Medications:   •  amLODIPine (NORVASC) 5 MG tablet, Take 5 mg by mouth Daily., Disp: , Rfl: 3  •  aspirin 81 MG EC tablet, Take 81 mg by mouth Daily., Disp: , Rfl:   •  FARXIGA 10 MG tablet, Take 1 tablet by mouth Daily., Disp: , Rfl: 3  •  metFORMIN (GLUCOPHAGE) 1000 MG tablet, Take 1,000 mg by mouth 2 (Two) Times a Day., Disp: , Rfl: 3  •  omeprazole (PRILOSEC) 40 MG capsule, Take 1 capsule by mouth 2 (Two) Times a Day., Disp: 60 capsule, Rfl: 5  •  rosuvastatin (CRESTOR) 10 MG tablet, Take 10 mg by mouth Daily., Disp: , Rfl:   •  Semaglutide,0.25 or 0.5MG/DOS, (OZEMPIC) 2 MG/1.5ML solution pen-injector, Inject 0.5 mg under the skin into the appropriate area as directed 1 (One) Time Per Week., Disp: , Rfl:   •  valsartan-hydrochlorothiazide (DIOVAN-HCT) 160-25 MG per tablet, Take 1 tablet by mouth Daily., Disp: , Rfl: 3      Objective:     Vitals:    09/28/22 1121   BP: 128/72   Pulse: 79   Weight: 89.8 kg (198 lb)   Height: 175.3 cm (69\")     Body mass index is 29.24 kg/m².    PHYSICAL " EXAM:    Neck:      Vascular: No JVD.   Pulmonary:      Effort: Pulmonary effort is normal.      Breath sounds: Normal breath sounds.   Cardiovascular:      Normal rate. Regular rhythm.      Murmurs: There is a harsh midsystolic murmur at the URSB, radiating to the neck.      No gallop. No click. No rub.   Pulses:     Intact distal pulses.           ECG 12 Lead    Date/Time: 9/28/2022 11:27 AM  Performed by: Fatuma Lyles APRN  Authorized by: Fatuma Lyles APRN   Comparison: compared with previous ECG from 9/24/2021  Similar to previous ECG  Rhythm: paced  Rate: normal  BPM: 79                Assessment:       Diagnosis Plan   1. Second degree heart block  ECG 12 Lead   2. History of permanent cardiac pacemaker placement     3. Aortic stenosis, moderate  Adult Transthoracic Echo Complete W/ Cont if Necessary Per Protocol     Orders Placed This Encounter   Procedures   • ECG 12 Lead     This order was created via procedure documentation     Order Specific Question:   Release to patient     Answer:   Routine Release   • Adult Transthoracic Echo Complete W/ Cont if Necessary Per Protocol     Standing Status:   Future     Standing Expiration Date:   9/28/2023     Order Specific Question:   Reason for exam?     Answer:   Valvular Function     Order Specific Question:   Valvular Function specification?     Answer:   Native Valvular Stenosis          Plan:       1.  Secondary heart block.  Status post permanent pacemaker.  Device check today demonstrated normal function and no episodes.      2.  Aortic stenosis.  Echocardiogram from February 2019 demonstrated moderate aortic stenosis.  He remains asymptomatic.  Given that it has been 3 years, I will order an echocardiogram for reassessment of his valve.      Further recommendations will be made pending the results of the echocardiogram.      As always, it has been a pleasure to participate in your patient's care.      Sincerely,         WICHO Araujo

## 2022-10-04 ENCOUNTER — RESEARCH ENCOUNTER (OUTPATIENT)
Dept: CARDIOLOGY | Facility: CLINIC | Age: 72
End: 2022-10-04

## 2022-10-04 NOTE — RESEARCH
Research study: [employer] PSR  Subject study ID#: [id number]  Subject initials: FELICIANO  Qualifying lead: [id number]     JANDRZEJ came to the [facility] offices on Sept 28, 2022 to see Fatuma SANDS and have his pacemaker interrogated by the clinic RN. I reviewed notes from both encounters to extract routine data for the [employer] registry. There were no qualifying adverse health events or device/lead adverse events to report. We will continue to follow FELICIANO per study protocol.      Cheri Richardson BSN RN CCRP  Clinical Research Coordinator

## 2022-10-13 ENCOUNTER — HOSPITAL ENCOUNTER (OUTPATIENT)
Dept: CARDIOLOGY | Facility: HOSPITAL | Age: 72
Discharge: HOME | End: 2022-10-13
Admitting: NURSE PRACTITIONER

## 2022-10-13 VITALS
WEIGHT: 198 LBS | HEART RATE: 77 BPM | DIASTOLIC BLOOD PRESSURE: 80 MMHG | BODY MASS INDEX: 29.33 KG/M2 | SYSTOLIC BLOOD PRESSURE: 140 MMHG | HEIGHT: 69 IN

## 2022-10-13 DIAGNOSIS — I35.0 AORTIC STENOSIS, MODERATE: ICD-10-CM

## 2022-10-13 LAB
AORTIC ARCH: 2.6 CM
AORTIC DIMENSIONLESS INDEX: 0.2 (DI)
ASCENDING AORTA: 3.1 CM
BH CV ECHO MEAS - ACS: 1.66 CM
BH CV ECHO MEAS - AO MAX PG: 58 MMHG
BH CV ECHO MEAS - AO MEAN PG: 37 MMHG
BH CV ECHO MEAS - AO ROOT DIAM: 3.3 CM
BH CV ECHO MEAS - AO V2 MAX: 381 CM/SEC
BH CV ECHO MEAS - AO V2 VTI: 88.5 CM
BH CV ECHO MEAS - AVA(I,D): 0.66 CM2
BH CV ECHO MEAS - EDV(CUBED): 119.7 ML
BH CV ECHO MEAS - EDV(MOD-SP2): 160 ML
BH CV ECHO MEAS - EDV(MOD-SP4): 179 ML
BH CV ECHO MEAS - EF(MOD-BP): 67 %
BH CV ECHO MEAS - EF(MOD-SP2): 66.9 %
BH CV ECHO MEAS - EF(MOD-SP4): 66.5 %
BH CV ECHO MEAS - ESV(CUBED): 26.3 ML
BH CV ECHO MEAS - ESV(MOD-SP2): 53 ML
BH CV ECHO MEAS - ESV(MOD-SP4): 60 ML
BH CV ECHO MEAS - FS: 39.6 %
BH CV ECHO MEAS - IVS/LVPW: 1.16 CM
BH CV ECHO MEAS - IVSD: 1.23 CM
BH CV ECHO MEAS - LAT PEAK E' VEL: 9 CM/SEC
BH CV ECHO MEAS - LV DIASTOLIC VOL/BSA (35-75): 87 CM2
BH CV ECHO MEAS - LV MASS(C)D: 214.4 GRAMS
BH CV ECHO MEAS - LV MAX PG: 2.05 MMHG
BH CV ECHO MEAS - LV MEAN PG: 1.08 MMHG
BH CV ECHO MEAS - LV SYSTOLIC VOL/BSA (12-30): 29.2 CM2
BH CV ECHO MEAS - LV V1 MAX: 71.6 CM/SEC
BH CV ECHO MEAS - LV V1 VTI: 16.9 CM
BH CV ECHO MEAS - LVIDD: 4.9 CM
BH CV ECHO MEAS - LVIDS: 3 CM
BH CV ECHO MEAS - LVOT AREA: 3.5 CM2
BH CV ECHO MEAS - LVOT DIAM: 2.1 CM
BH CV ECHO MEAS - LVPWD: 1.06 CM
BH CV ECHO MEAS - MED PEAK E' VEL: 8.4 CM/SEC
BH CV ECHO MEAS - MV A DUR: 0.18 SEC
BH CV ECHO MEAS - MV A MAX VEL: 114.8 CM/SEC
BH CV ECHO MEAS - MV DEC SLOPE: 316.2 CM/SEC2
BH CV ECHO MEAS - MV DEC TIME: 0.15 MSEC
BH CV ECHO MEAS - MV E MAX VEL: 68.6 CM/SEC
BH CV ECHO MEAS - MV E/A: 0.6
BH CV ECHO MEAS - MV MAX PG: 6.2 MMHG
BH CV ECHO MEAS - MV MEAN PG: 2.8 MMHG
BH CV ECHO MEAS - MV P1/2T: 81.7 MSEC
BH CV ECHO MEAS - MV V2 VTI: 22.8 CM
BH CV ECHO MEAS - MVA(P1/2T): 2.7 CM2
BH CV ECHO MEAS - MVA(VTI): 2.6 CM2
BH CV ECHO MEAS - PA ACC TIME: 0.1 SEC
BH CV ECHO MEAS - PA PR(ACCEL): 33.8 MMHG
BH CV ECHO MEAS - PA V2 MAX: 109.9 CM/SEC
BH CV ECHO MEAS - PULM A REVS DUR: 0.13 SEC
BH CV ECHO MEAS - PULM A REVS VEL: 20.4 CM/SEC
BH CV ECHO MEAS - PULM DIAS VEL: 26.2 CM/SEC
BH CV ECHO MEAS - PULM S/D: 1.17
BH CV ECHO MEAS - PULM SYS VEL: 30.6 CM/SEC
BH CV ECHO MEAS - QP/QS: 0.91
BH CV ECHO MEAS - RAP SYSTOLE: 3 MMHG
BH CV ECHO MEAS - RV MAX PG: 1.72 MMHG
BH CV ECHO MEAS - RV V1 MAX: 65.6 CM/SEC
BH CV ECHO MEAS - RV V1 VTI: 14.4 CM
BH CV ECHO MEAS - RVOT DIAM: 2.17 CM
BH CV ECHO MEAS - RVSP: 18 MMHG
BH CV ECHO MEAS - SI(MOD-SP2): 52 ML/M2
BH CV ECHO MEAS - SI(MOD-SP4): 57.9 ML/M2
BH CV ECHO MEAS - SV(LVOT): 58.5 ML
BH CV ECHO MEAS - SV(MOD-SP2): 107 ML
BH CV ECHO MEAS - SV(MOD-SP4): 119 ML
BH CV ECHO MEAS - SV(RVOT): 53.1 ML
BH CV ECHO MEAS - TAPSE (>1.6): 2.4 CM
BH CV ECHO MEAS - TR MAX PG: 15.1 MMHG
BH CV ECHO MEAS - TR MAX VEL: 194.3 CM/SEC
BH CV ECHO MEASUREMENTS AVERAGE E/E' RATIO: 7.89
BH CV XLRA - RV BASE: 3.7 CM
BH CV XLRA - RV LENGTH: 7.9 CM
BH CV XLRA - RV MID: 3.1 CM
BH CV XLRA - TDI S': 12.1 CM/SEC
LEFT ATRIUM VOLUME INDEX: 18 ML/M2
MAXIMAL PREDICTED HEART RATE: 146 BPM
SINUS: 3.2 CM
STJ: 3.2 CM
STRESS TARGET HR: 124 BPM

## 2022-10-13 PROCEDURE — 93306 TTE W/DOPPLER COMPLETE: CPT

## 2022-10-13 PROCEDURE — 93306 TTE W/DOPPLER COMPLETE: CPT | Performed by: INTERNAL MEDICINE

## 2022-10-13 PROCEDURE — 25010000002 PERFLUTREN (DEFINITY) 8.476 MG IN SODIUM CHLORIDE (PF) 0.9 % 10 ML INJECTION: Performed by: NURSE PRACTITIONER

## 2022-10-13 RX ADMIN — PERFLUTREN 2 ML: 6.52 INJECTION, SUSPENSION INTRAVENOUS at 08:56

## 2022-10-14 ENCOUNTER — TELEPHONE (OUTPATIENT)
Dept: CARDIOLOGY | Facility: CLINIC | Age: 72
End: 2022-10-14

## 2022-10-14 DIAGNOSIS — I35.0 AORTIC STENOSIS, MODERATE: Primary | ICD-10-CM

## 2022-10-14 NOTE — TELEPHONE ENCOUNTER
I spoke with him regarding his echocardiogram which demonstrated severe aortic stenosis.  He remains completely asymptomatic.  I will discuss with Dr. An before making further recommendations.  Of note, he follows with Dr. Torres for an aortic aneurysm and has an upcoming appointment with him in December.

## 2022-10-19 NOTE — TELEPHONE ENCOUNTER
Left VM of recommendations and to call back with any further questions or concerns, allowed by verbal release form.     Scheduling, please schedule patient to see Dr. An around 3/28/23 along with an echo on that day per Fatuma Lyles.    Sheila Coleman RN  Triage MG

## 2022-10-19 NOTE — TELEPHONE ENCOUNTER
Please let him know I reviewed his echo with Dr. An.  He recommends a 6 month follow up with Dr. An with an echocardiogram that day.      Please see the appt and echo are scheduled.

## 2022-10-21 NOTE — TELEPHONE ENCOUNTER
Scheduling called patient and left a voicemail to return call as noted in echo referral communications.     Thank you,   Lizeth

## 2022-11-04 ENCOUNTER — TRANSCRIBE ORDERS (OUTPATIENT)
Dept: ADMINISTRATIVE | Facility: HOSPITAL | Age: 72
End: 2022-11-04

## 2022-11-04 ENCOUNTER — LAB (OUTPATIENT)
Dept: LAB | Facility: HOSPITAL | Age: 72
End: 2022-11-04

## 2022-11-04 DIAGNOSIS — E78.5 HYPERLIPIDEMIA, UNSPECIFIED HYPERLIPIDEMIA TYPE: Primary | ICD-10-CM

## 2022-11-04 DIAGNOSIS — E11.9 DIABETES MELLITUS WITHOUT COMPLICATION: ICD-10-CM

## 2022-11-04 DIAGNOSIS — I10 ESSENTIAL HYPERTENSION, MALIGNANT: ICD-10-CM

## 2022-11-04 DIAGNOSIS — E78.5 HYPERLIPIDEMIA, UNSPECIFIED HYPERLIPIDEMIA TYPE: ICD-10-CM

## 2022-11-04 LAB
ALBUMIN SERPL-MCNC: 3.7 G/DL (ref 3.5–5.2)
ALBUMIN/GLOB SERPL: 1.5 G/DL
ALP SERPL-CCNC: 82 U/L (ref 39–117)
ALT SERPL W P-5'-P-CCNC: 12 U/L (ref 1–41)
ANION GAP SERPL CALCULATED.3IONS-SCNC: 9 MMOL/L (ref 5–15)
AST SERPL-CCNC: 13 U/L (ref 1–40)
BILIRUB SERPL-MCNC: 0.6 MG/DL (ref 0–1.2)
BILIRUB UR QL STRIP: NEGATIVE
BUN SERPL-MCNC: 15 MG/DL (ref 8–23)
BUN/CREAT SERPL: 15.8 (ref 7–25)
CALCIUM SPEC-SCNC: 9.5 MG/DL (ref 8.6–10.5)
CHLORIDE SERPL-SCNC: 98 MMOL/L (ref 98–107)
CHOLEST SERPL-MCNC: 112 MG/DL (ref 0–200)
CLARITY UR: CLEAR
CO2 SERPL-SCNC: 29 MMOL/L (ref 22–29)
COLOR UR: YELLOW
CREAT SERPL-MCNC: 0.95 MG/DL (ref 0.76–1.27)
EGFRCR SERPLBLD CKD-EPI 2021: 84 ML/MIN/1.73
GLOBULIN UR ELPH-MCNC: 2.5 GM/DL
GLUCOSE SERPL-MCNC: 124 MG/DL (ref 65–99)
GLUCOSE UR STRIP-MCNC: ABNORMAL MG/DL
HBA1C MFR BLD: 6 % (ref 4.8–5.6)
HDLC SERPL-MCNC: 45 MG/DL (ref 40–60)
HGB UR QL STRIP.AUTO: NEGATIVE
KETONES UR QL STRIP: NEGATIVE
LDLC SERPL CALC-MCNC: 41 MG/DL (ref 0–100)
LDLC/HDLC SERPL: 0.8 {RATIO}
LEUKOCYTE ESTERASE UR QL STRIP.AUTO: NEGATIVE
NITRITE UR QL STRIP: NEGATIVE
PH UR STRIP.AUTO: 5.5 [PH] (ref 5–8)
POTASSIUM SERPL-SCNC: 4.1 MMOL/L (ref 3.5–5.2)
PROT SERPL-MCNC: 6.2 G/DL (ref 6–8.5)
PROT UR QL STRIP: NEGATIVE
SODIUM SERPL-SCNC: 136 MMOL/L (ref 136–145)
SP GR UR STRIP: >=1.03 (ref 1–1.03)
TRIGL SERPL-MCNC: 154 MG/DL (ref 0–150)
UROBILINOGEN UR QL STRIP: ABNORMAL
VLDLC SERPL-MCNC: 26 MG/DL (ref 5–40)

## 2022-11-04 PROCEDURE — 36415 COLL VENOUS BLD VENIPUNCTURE: CPT

## 2022-11-04 PROCEDURE — 81003 URINALYSIS AUTO W/O SCOPE: CPT

## 2022-11-04 PROCEDURE — 80053 COMPREHEN METABOLIC PANEL: CPT

## 2022-11-04 PROCEDURE — 83036 HEMOGLOBIN GLYCOSYLATED A1C: CPT

## 2022-11-04 PROCEDURE — 80061 LIPID PANEL: CPT

## 2022-11-09 NOTE — PROGRESS NOTES
Date of Office Visit: 21  Encounter Provider: Loc An MD  Place of Service: [facility] CARDIOLOGY  Patient Name: Alfredo Toure  :1950  [id number]    Chief Complaint   Patient presents with   • second degree heart block   :     HPI: Alfredo Toure is a 71 y.o. male is a gentleman that had high degree AV block 2 years ago and underwent permanent pacemaker with Zb[employer]iew Temple with his pacing is been great doing fine no issues since has diabetes is trying to work on control  fully vaccinated wants his booster    Past Medical History:   Diagnosis Date   • Arrhythmia     2ND DEGREE HEART BLOCK   • Colon polyp    • Diabetes mellitus (CMS/HCC)    • GERD (gastroesophageal reflux disease)    • Hyperlipidemia    • Hypertension        Past Surgical History:   Procedure Laterality Date   • ABDOMINAL SURGERY      at [facility]   • CARDIAC CATHETERIZATION     • CARDIAC ELECTROPHYSIOLOGY PROCEDURE Left 2/15/2019    Procedure: Pacemaker DC new- [employer];  Surgeon: Zbigniew Tam MD;  Location: [facility] CATH INVASIVE LOCATION;  Service: Cardiology   • CHOLECYSTECTOMY     • COLONOSCOPY     • COLONOSCOPY N/A 2021    Procedure: COLONOSCOPY to cecum with cold polypectomy;  Surgeon: Zbigniew Washington MD;  Location: [facility] ENDOSCOPY;  Service: General;  Laterality: N/A;  Pre: h/x of polyps  Post: diverticulosis. polyp   • ENDOSCOPY N/A 2021    Procedure: ESOPHAGOGASTRODUODENOSCOPY with biopsies;  Surgeon: Zbigniew Washington MD;  Location: [facility] ENDOSCOPY;  Service: General;  Laterality: N/A;  Pre: reflux  Post: mild gastritis, hiatal hernia   • LUMBAR DISCECTOMY         Social History     Socioeconomic History   • Marital status:      Spouse name: Not on file   • Number of children: Not on file   • Years of education: Not on file   • Highest education level: Not on file   Tobacco Use   • Smoking status: Never Smoker   • Smokeless tobacco: Never Used  Detail Level: Simple   • Tobacco comment: Daily caffeine daily   Vaping Use   • Vaping Use: Never used   Substance and Sexual Activity   • Alcohol use: Yes     Comment: on occasion - monthly.   alcohol is not the problem   • Drug use: No   • Sexual activity: Never       Family History   Problem Relation Age of Onset   • Colon cancer Maternal Uncle    • Malig Hyperthermia Neg Hx        Review of Systems   Constitutional: Negative for decreased appetite, fever, malaise/fatigue and weight loss.   HENT: Negative for nosebleeds.    Eyes: Negative for double vision.   Cardiovascular: Negative for chest pain, claudication, cyanosis, dyspnea on exertion, irregular heartbeat, leg swelling, near-syncope, orthopnea, palpitations, paroxysmal nocturnal dyspnea and syncope.   Respiratory: Negative for cough, hemoptysis and shortness of breath.    Hematologic/Lymphatic: Negative for bleeding problem.   Skin: Negative for rash.   Musculoskeletal: Negative for falls and myalgias.   Gastrointestinal: Negative for hematochezia, jaundice, melena, nausea and vomiting.   Genitourinary: Negative for hematuria.   Neurological: Negative for dizziness and seizures.   Psychiatric/Behavioral: Negative for altered mental status and memory loss.       No Known Allergies      Current Outpatient Medications:   •  aspirin 81 MG EC tablet, Take 81 mg by mouth Daily., Disp: , Rfl:   •  FARXIGA 10 MG tablet, Take 1 tablet by mouth Daily., Disp: , Rfl: 3  •  metFORMIN (GLUCOPHAGE) 1000 MG tablet, Take 1,000 mg by mouth 2 (Two) Times a Day., Disp: , Rfl: 3  •  omeprazole (PRILOSEC) 40 MG capsule, Take 1 capsule by mouth 2 (Two) Times a Day., Disp: 60 capsule, Rfl: 5  •  rosuvastatin (CRESTOR) 10 MG tablet, Take 10 mg by mouth Daily., Disp: , Rfl:   •  Semaglutide,0.25 or 0.5MG/DOS, (OZEMPIC) 2 MG/1.5ML solution pen-injector, Inject 0.5 mg under the skin into the appropriate area as directed 1 (One) Time Per Week., Disp: , Rfl:   •  valsartan-hydrochlorothiazide (DIOVAN-HCT)  "160-25 MG per tablet, Take 1 tablet by mouth Daily., Disp: , Rfl: 3  •  amLODIPine (NORVASC) 5 MG tablet, Take 5 mg by mouth Daily., Disp: , Rfl: 3      Objective:     Vitals:    09/24/21 1359   BP: 118/80   Pulse: 75   Weight: 89.4 kg (197 lb 3.2 oz)   Height: 175.3 cm (69\")     Body mass index is 29.12 kg/m².    Constitutional:       Appearance: Well-developed.   Eyes:      General: No scleral icterus.  HENT:      Head: Normocephalic.   Neck:      Thyroid: No thyromegaly.      Vascular: No JVD.      Lymphadenopathy: No cervical adenopathy.   Pulmonary:      Effort: Pulmonary effort is normal.      Breath sounds: Normal breath sounds. No wheezing. No rales.   Cardiovascular:      Normal rate. Regular rhythm.      No gallop.   Edema:     Peripheral edema absent.   Abdominal:      Palpations: Abdomen is soft.      Tenderness: There is no abdominal tenderness.   Musculoskeletal: Normal range of motion. Skin:     General: Skin is warm and dry.      Findings: No rash.   Neurological:      Mental Status: Alert and oriented to person, place, and time.           ECG 12 Lead    Date/Time: 9/24/2021 2:36 PM  Performed by: oLc An MD  Authorized by: Loc An MD   Comparison: compared with previous ECG   Similar to previous ECG  Rhythm: sinus rhythm and paced    Clinical impression: abnormal EKG             Assessment:       Diagnosis Plan   1. Second degree heart block     2. RBBB     3. History of permanent cardiac pacemaker placement     4. Type II diabetes mellitus with complication (HCC)            Plan:       I think he is here for follow-up and is doing well he had a heart block and a pacemaker placed so he should do great going forward he is got about 3 years of battery life left on his battery which seems a little short to me but it seems like it is working well and is pacing 100% of the time we will have him come back and see us in a year sooner if he has trouble    As always, it has been a pleasure " to participate in your patient's care.      Sincerely,       Loc An MD                   Detail Level: Zone Detail Level: Detailed Patient Specific Counseling (Will Not Stick From Patient To Patient): Patient was advised to take Zyrtec in the am and Benadryl in the pm.

## 2023-01-11 ENCOUNTER — OFFICE VISIT (OUTPATIENT)
Dept: CARDIOLOGY | Facility: CLINIC | Age: 73
End: 2023-01-11
Payer: MEDICARE

## 2023-01-11 VITALS
BODY MASS INDEX: 29.15 KG/M2 | HEIGHT: 69 IN | DIASTOLIC BLOOD PRESSURE: 86 MMHG | WEIGHT: 196.8 LBS | HEART RATE: 71 BPM | SYSTOLIC BLOOD PRESSURE: 130 MMHG

## 2023-01-11 DIAGNOSIS — I35.0 AORTIC STENOSIS, MODERATE: Primary | ICD-10-CM

## 2023-01-11 DIAGNOSIS — Z95.0 HISTORY OF PERMANENT CARDIAC PACEMAKER PLACEMENT: ICD-10-CM

## 2023-01-11 DIAGNOSIS — I10 ESSENTIAL HYPERTENSION: ICD-10-CM

## 2023-01-11 DIAGNOSIS — I71.21 ANEURYSM OF ASCENDING AORTA WITHOUT RUPTURE: ICD-10-CM

## 2023-01-11 DIAGNOSIS — E11.8 TYPE II DIABETES MELLITUS WITH COMPLICATION: ICD-10-CM

## 2023-01-11 DIAGNOSIS — R06.09 DYSPNEA ON EXERTION: ICD-10-CM

## 2023-01-11 DIAGNOSIS — I35.0 NONRHEUMATIC AORTIC VALVE STENOSIS: ICD-10-CM

## 2023-01-11 PROCEDURE — 99214 OFFICE O/P EST MOD 30 MIN: CPT | Performed by: INTERNAL MEDICINE

## 2023-01-11 PROCEDURE — 93000 ELECTROCARDIOGRAM COMPLETE: CPT | Performed by: INTERNAL MEDICINE

## 2023-01-11 NOTE — PROGRESS NOTES
Date of Office Visit: 23  Encounter Provider: Loc An MD  Place of Service: [facility] CARDIOLOGY  Patient Name: Alfredo Toure  :1950  [id number]    Chief Complaint   Patient presents with   • Aortic Stenosis   :     HPI: Alfredo Toure is a 72 y.o. male is a gentleman that had high degree AV block 2 years ago and underwent permanent pacemaker with Zbigniew Temple with his pacing is been great doing fine no issues since has diabetes is trying to work on control  fully vaccinated wants his booster.  This is a gentleman that has had a longstanding murmur likely has a bicuspid aortic valve has a thoracic aortic aneurysm that is being followed by [facility] abdi it is 4.3 cm and its been stable.  He does not exercise he is retired but does not do much activity but the activity does he says he can do without problems he does not have PND does not have orthopnea does not really have edema he has some atypical chest pain not related to activity.    Past Medical History:   Diagnosis Date   • Arrhythmia     2ND DEGREE HEART BLOCK   • Colon polyp    • Diabetes mellitus (HCC)    • GERD (gastroesophageal reflux disease)    • Hyperlipidemia    • Hypertension        Past Surgical History:   Procedure Laterality Date   • ABDOMINAL SURGERY      at [facility]   • CARDIAC CATHETERIZATION     • CARDIAC ELECTROPHYSIOLOGY PROCEDURE Left 2/15/2019    Procedure: Pacemaker DC new- [employer];  Surgeon: Zbigniew Tam MD;  Location: [facility] CATH INVASIVE LOCATION;  Service: Cardiology   • CHOLECYSTECTOMY     • COLONOSCOPY     • COLONOSCOPY N/A 2021    Procedure: COLONOSCOPY to cecum with cold polypectomy;  Surgeon: Zbigniew Washington MD;  Location: [facility] ENDOSCOPY;  Service: General;  Laterality: N/A;  Pre: h/x of polyps  Post: diverticulosis. polyp   • ENDOSCOPY N/A 2021    Procedure: ESOPHAGOGASTRODUODENOSCOPY with biopsies;  Surgeon: Zbigniew Washington MD;   Location: [facility] ENDOSCOPY;  Service: General;  Laterality: N/A;  Pre: reflux  Post: mild gastritis, hiatal hernia   • LUMBAR DISCECTOMY         Social History     Socioeconomic History   • Marital status:    Tobacco Use   • Smoking status: Never   • Smokeless tobacco: Never   • Tobacco comments:     Daily caffeine daily   Vaping Use   • Vaping Use: Never used   Substance and Sexual Activity   • Alcohol use: Yes     Comment: on occasion - monthly.   alcohol is not the problem   • Drug use: No   • Sexual activity: Never       Family History   Problem Relation Age of Onset   • Colon cancer Maternal Uncle    • Malig Hyperthermia Neg Hx        Review of Systems   Constitutional: Negative for decreased appetite, fever, malaise/fatigue and weight loss.   HENT: Negative for nosebleeds.    Eyes: Negative for double vision.   Cardiovascular: Negative for chest pain, claudication, cyanosis, dyspnea on exertion, irregular heartbeat, leg swelling, near-syncope, orthopnea, palpitations, paroxysmal nocturnal dyspnea and syncope.   Respiratory: Negative for cough, hemoptysis and shortness of breath.    Hematologic/Lymphatic: Negative for bleeding problem.   Skin: Negative for rash.   Musculoskeletal: Negative for falls and myalgias.   Gastrointestinal: Negative for hematochezia, jaundice, melena, nausea and vomiting.   Genitourinary: Negative for hematuria.   Neurological: Negative for dizziness and seizures.   Psychiatric/Behavioral: Negative for altered mental status and memory loss.       No Known Allergies      Current Outpatient Medications:   •  amLODIPine (NORVASC) 5 MG tablet, Take 5 mg by mouth Daily., Disp: , Rfl: 3  •  aspirin 81 MG EC tablet, Take 81 mg by mouth Daily., Disp: , Rfl:   •  FARXIGA 10 MG tablet, Take 1 tablet by mouth Daily., Disp: , Rfl: 3  •  metFORMIN (GLUCOPHAGE) 1000 MG tablet, Take 1,000 mg by mouth 2 (Two) Times a Day., Disp: , Rfl: 3  •  omeprazole (PRILOSEC) 40 MG capsule, Take 1 capsule  "by mouth 2 (Two) Times a Day., Disp: 60 capsule, Rfl: 5  •  rosuvastatin (CRESTOR) 10 MG tablet, Take 10 mg by mouth Daily., Disp: , Rfl:   •  Semaglutide,0.25 or 0.5MG/DOS, (OZEMPIC) 2 MG/1.5ML solution pen-injector, Inject 0.5 mg under the skin into the appropriate area as directed 1 (One) Time Per Week., Disp: , Rfl:   •  valsartan-hydrochlorothiazide (DIOVAN-HCT) 160-25 MG per tablet, Take 1 tablet by mouth Daily., Disp: , Rfl: 3      Objective:     Vitals:    01/11/23 1110   BP: 130/86   Pulse: 71   Weight: 89.3 kg (196 lb 12.8 oz)   Height: 175.3 cm (69\")     Body mass index is 29.06 kg/m².    Constitutional:       Appearance: Well-developed.   Eyes:      General: No scleral icterus.  HENT:      Head: Normocephalic.   Neck:      Thyroid: No thyromegaly.      Vascular: No JVD.      Lymphadenopathy: No cervical adenopathy.   Pulmonary:      Effort: Pulmonary effort is normal.      Breath sounds: Normal breath sounds. No wheezing. No rales.   Cardiovascular:      Normal rate. Regular rhythm.      Murmurs: There is a harsh crescendo-decrescendo, mid to late systolic murmur at the URSB, radiating to the neck.      No gallop.   Edema:     Peripheral edema absent.   Abdominal:      Palpations: Abdomen is soft.      Tenderness: There is no abdominal tenderness.   Musculoskeletal: Normal range of motion. Skin:     General: Skin is warm and dry.      Findings: No rash.   Neurological:      Mental Status: Alert and oriented to person, place, and time.           ECG 12 Lead    Date/Time: 1/11/2023 11:55 AM  Performed by: Loc An MD  Authorized by: Loc An MD   Comparison: compared with previous ECG   Rhythm: sinus rhythm and paced    Clinical impression: abnormal EKG             Assessment:       Diagnosis Plan   1. Aortic stenosis, moderate        2. History of permanent cardiac pacemaker placement        3. Dyspnea on exertion        4. Nonrheumatic aortic valve stenosis        5. Aneurysm of ascending " aorta without rupture        6. Type II diabetes mellitus with complication (HCC)        7. Essential hypertension               Plan:       His pacemaker is working fine the bigger issue is I think he probably has a bicuspid aortic valve that is now getting worse his last echo is very close to the criteria for severe AS has normal LV function the problem is he does not really do a whole lot of activity it is a little bit tough because he gets some vague complaints like he could be symptomatic and he is paced all the time so I am going to repeat his echo now see what his valve looks like but I am also going to have him walk on a treadmill for symptomatic treadmill I I do not know what we will do because I am not sure his heart rate will get up that fast but we will going to do it and see how he does if he goes like 8 minutes then we can watch things if he goes two then I think we probably need to do something    As always, it has been a pleasure to participate in your patient's care.      Sincerely,       Loc An MD

## 2023-02-01 ENCOUNTER — HOSPITAL ENCOUNTER (OUTPATIENT)
Dept: CARDIOLOGY | Facility: HOSPITAL | Age: 73
Discharge: HOME | End: 2023-02-01
Admitting: INTERNAL MEDICINE
Payer: MEDICARE

## 2023-02-01 VITALS
WEIGHT: 196 LBS | SYSTOLIC BLOOD PRESSURE: 140 MMHG | HEART RATE: 66 BPM | BODY MASS INDEX: 29.03 KG/M2 | DIASTOLIC BLOOD PRESSURE: 90 MMHG | HEIGHT: 69 IN

## 2023-02-01 DIAGNOSIS — I10 ESSENTIAL HYPERTENSION: ICD-10-CM

## 2023-02-01 DIAGNOSIS — Z95.0 HISTORY OF PERMANENT CARDIAC PACEMAKER PLACEMENT: ICD-10-CM

## 2023-02-01 DIAGNOSIS — I35.0 AORTIC STENOSIS, MODERATE: ICD-10-CM

## 2023-02-01 DIAGNOSIS — I35.0 NONRHEUMATIC AORTIC VALVE STENOSIS: ICD-10-CM

## 2023-02-01 DIAGNOSIS — R06.09 DYSPNEA ON EXERTION: ICD-10-CM

## 2023-02-01 DIAGNOSIS — E11.8 TYPE II DIABETES MELLITUS WITH COMPLICATION: ICD-10-CM

## 2023-02-01 DIAGNOSIS — I71.21 ANEURYSM OF ASCENDING AORTA WITHOUT RUPTURE: ICD-10-CM

## 2023-02-01 LAB
AORTIC ARCH: 2.6 CM
AORTIC DIMENSIONLESS INDEX: 0.2 (DI)
ASCENDING AORTA: 3 CM
BH CV ECHO MEAS - ACS: 1.7 CM
BH CV ECHO MEAS - AO MAX PG: 70.3 MMHG
BH CV ECHO MEAS - AO MEAN PG: 39.9 MMHG
BH CV ECHO MEAS - AO ROOT DIAM: 3.6 CM
BH CV ECHO MEAS - AO V2 MAX: 419.1 CM/SEC
BH CV ECHO MEAS - AO V2 VTI: 105.9 CM
BH CV ECHO MEAS - AVA(I,D): 0.51 CM2
BH CV ECHO MEAS - EDV(CUBED): 110.6 ML
BH CV ECHO MEAS - EDV(MOD-SP2): 181 ML
BH CV ECHO MEAS - EDV(MOD-SP4): 154 ML
BH CV ECHO MEAS - EF(MOD-BP): 62.8 %
BH CV ECHO MEAS - EF(MOD-SP2): 68 %
BH CV ECHO MEAS - EF(MOD-SP4): 55.8 %
BH CV ECHO MEAS - ESV(CUBED): 49.6 ML
BH CV ECHO MEAS - ESV(MOD-SP2): 58 ML
BH CV ECHO MEAS - ESV(MOD-SP4): 68 ML
BH CV ECHO MEAS - FS: 23.5 %
BH CV ECHO MEAS - IVS/LVPW: 0.95 CM
BH CV ECHO MEAS - IVSD: 1.19 CM
BH CV ECHO MEAS - LAT PEAK E' VEL: 8.4 CM/SEC
BH CV ECHO MEAS - LV DIASTOLIC VOL/BSA (35-75): 75.2 CM2
BH CV ECHO MEAS - LV MASS(C)D: 225.2 GRAMS
BH CV ECHO MEAS - LV MAX PG: 1.79 MMHG
BH CV ECHO MEAS - LV MEAN PG: 1.05 MMHG
BH CV ECHO MEAS - LV SYSTOLIC VOL/BSA (12-30): 33.2 CM2
BH CV ECHO MEAS - LV V1 MAX: 66.8 CM/SEC
BH CV ECHO MEAS - LV V1 VTI: 16.3 CM
BH CV ECHO MEAS - LVIDD: 4.8 CM
BH CV ECHO MEAS - LVIDS: 3.7 CM
BH CV ECHO MEAS - LVOT AREA: 3.3 CM2
BH CV ECHO MEAS - LVOT DIAM: 2.05 CM
BH CV ECHO MEAS - LVPWD: 1.26 CM
BH CV ECHO MEAS - MED PEAK E' VEL: 14.3 CM/SEC
BH CV ECHO MEAS - MV A DUR: 0.18 SEC
BH CV ECHO MEAS - MV A MAX VEL: 111.4 CM/SEC
BH CV ECHO MEAS - MV DEC SLOPE: 260 CM/SEC2
BH CV ECHO MEAS - MV DEC TIME: 0.15 MSEC
BH CV ECHO MEAS - MV E MAX VEL: 85.3 CM/SEC
BH CV ECHO MEAS - MV E/A: 0.77
BH CV ECHO MEAS - MV MAX PG: 4.3 MMHG
BH CV ECHO MEAS - MV MEAN PG: 2.32 MMHG
BH CV ECHO MEAS - MV P1/2T: 91.8 MSEC
BH CV ECHO MEAS - MV V2 VTI: 22.2 CM
BH CV ECHO MEAS - MVA(P1/2T): 2.4 CM2
BH CV ECHO MEAS - MVA(VTI): 2.43 CM2
BH CV ECHO MEAS - PA ACC TIME: 0.11 SEC
BH CV ECHO MEAS - PA PR(ACCEL): 29.1 MMHG
BH CV ECHO MEAS - PA V2 MAX: 97.7 CM/SEC
BH CV ECHO MEAS - PULM A REVS DUR: 0.14 SEC
BH CV ECHO MEAS - PULM A REVS VEL: 29.2 CM/SEC
BH CV ECHO MEAS - PULM DIAS VEL: 33.1 CM/SEC
BH CV ECHO MEAS - PULM S/D: 0.83
BH CV ECHO MEAS - PULM SYS VEL: 27.5 CM/SEC
BH CV ECHO MEAS - QP/QS: 1.17
BH CV ECHO MEAS - RAP SYSTOLE: 3 MMHG
BH CV ECHO MEAS - RV MAX PG: 2.02 MMHG
BH CV ECHO MEAS - RV V1 MAX: 71.1 CM/SEC
BH CV ECHO MEAS - RV V1 VTI: 16.1 CM
BH CV ECHO MEAS - RVOT DIAM: 2.24 CM
BH CV ECHO MEAS - RVSP: 18.7 MMHG
BH CV ECHO MEAS - SI(MOD-SP2): 60.1 ML/M2
BH CV ECHO MEAS - SI(MOD-SP4): 42 ML/M2
BH CV ECHO MEAS - SV(LVOT): 54 ML
BH CV ECHO MEAS - SV(MOD-SP2): 123 ML
BH CV ECHO MEAS - SV(MOD-SP4): 86 ML
BH CV ECHO MEAS - SV(RVOT): 63.4 ML
BH CV ECHO MEAS - TAPSE (>1.6): 3.3 CM
BH CV ECHO MEAS - TR MAX PG: 15.7 MMHG
BH CV ECHO MEAS - TR MAX VEL: 197.9 CM/SEC
BH CV ECHO MEASUREMENTS AVERAGE E/E' RATIO: 7.52
BH CV STRESS BP STAGE 1: NORMAL
BH CV STRESS BP STAGE 2: NORMAL
BH CV STRESS DURATION MIN STAGE 1: 3
BH CV STRESS DURATION MIN STAGE 2: 2
BH CV STRESS DURATION SEC STAGE 1: 0
BH CV STRESS DURATION SEC STAGE 2: 0
BH CV STRESS GRADE STAGE 1: 10
BH CV STRESS GRADE STAGE 2: 12
BH CV STRESS HR STAGE 1: 98
BH CV STRESS HR STAGE 2: 110
BH CV STRESS METS STAGE 1: 5
BH CV STRESS METS STAGE 2: 6
BH CV STRESS PROTOCOL 1: NORMAL
BH CV STRESS RECOVERY BP: NORMAL MMHG
BH CV STRESS RECOVERY HR: 88 BPM
BH CV STRESS SPEED STAGE 1: 1.7
BH CV STRESS SPEED STAGE 2: 2.5
BH CV STRESS STAGE 1: 1
BH CV STRESS STAGE 2: 2
BH CV XLRA - RV BASE: 4 CM
BH CV XLRA - RV LENGTH: 7.8 CM
BH CV XLRA - RV MID: 3.4 CM
BH CV XLRA - TDI S': 16 CM/SEC
LEFT ATRIUM VOLUME INDEX: 24.1 ML/M2
MAXIMAL PREDICTED HEART RATE: 146 BPM
MAXIMAL PREDICTED HEART RATE: 146 BPM
PERCENT MAX PREDICTED HR: 75.34 %
SINUS: 3.2 CM
STJ: 2.9 CM
STRESS BASELINE BP: NORMAL MMHG
STRESS BASELINE HR: 85 BPM
STRESS PERCENT HR: 89 %
STRESS POST ESTIMATED WORKLOAD: 6 METS
STRESS POST EXERCISE DUR MIN: 5 MIN
STRESS POST EXERCISE DUR SEC: 0 SEC
STRESS POST PEAK BP: NORMAL MMHG
STRESS POST PEAK HR: 110 BPM
STRESS TARGET HR: 124 BPM
STRESS TARGET HR: 124 BPM

## 2023-02-01 PROCEDURE — 93017 CV STRESS TEST TRACING ONLY: CPT

## 2023-02-01 PROCEDURE — 25010000002 PERFLUTREN (DEFINITY) 8.476 MG IN SODIUM CHLORIDE (PF) 0.9 % 10 ML INJECTION: Performed by: INTERNAL MEDICINE

## 2023-02-01 PROCEDURE — 93018 CV STRESS TEST I&R ONLY: CPT | Performed by: INTERNAL MEDICINE

## 2023-02-01 PROCEDURE — 93306 TTE W/DOPPLER COMPLETE: CPT

## 2023-02-01 PROCEDURE — 93306 TTE W/DOPPLER COMPLETE: CPT | Performed by: INTERNAL MEDICINE

## 2023-02-01 PROCEDURE — 93016 CV STRESS TEST SUPVJ ONLY: CPT | Performed by: INTERNAL MEDICINE

## 2023-02-01 RX ADMIN — PERFLUTREN 2 ML: 6.52 INJECTION, SUSPENSION INTRAVENOUS at 11:05

## 2023-02-21 ENCOUNTER — TRANSCRIBE ORDERS (OUTPATIENT)
Dept: ADMINISTRATIVE | Facility: HOSPITAL | Age: 73
End: 2023-02-21
Payer: MEDICARE

## 2023-02-21 ENCOUNTER — LAB (OUTPATIENT)
Dept: LAB | Facility: HOSPITAL | Age: 73
End: 2023-02-21
Payer: MEDICARE

## 2023-02-21 DIAGNOSIS — E11.9 DIABETES MELLITUS WITHOUT COMPLICATION: Primary | ICD-10-CM

## 2023-02-21 DIAGNOSIS — I10 ESSENTIAL HYPERTENSION, MALIGNANT: ICD-10-CM

## 2023-02-21 DIAGNOSIS — E78.5 HYPERLIPIDEMIA, UNSPECIFIED HYPERLIPIDEMIA TYPE: ICD-10-CM

## 2023-02-21 DIAGNOSIS — E11.9 DIABETES MELLITUS WITHOUT COMPLICATION: ICD-10-CM

## 2023-02-21 LAB
ALBUMIN SERPL-MCNC: 3.9 G/DL (ref 3.5–5.2)
ALBUMIN/GLOB SERPL: 1.5 G/DL
ALP SERPL-CCNC: 87 U/L (ref 39–117)
ALT SERPL W P-5'-P-CCNC: 11 U/L (ref 1–41)
ANION GAP SERPL CALCULATED.3IONS-SCNC: 10.5 MMOL/L (ref 5–15)
AST SERPL-CCNC: 14 U/L (ref 1–40)
BILIRUB SERPL-MCNC: 0.5 MG/DL (ref 0–1.2)
BUN SERPL-MCNC: 15 MG/DL (ref 8–23)
BUN/CREAT SERPL: 17.2 (ref 7–25)
CALCIUM SPEC-SCNC: 9.7 MG/DL (ref 8.6–10.5)
CHLORIDE SERPL-SCNC: 96 MMOL/L (ref 98–107)
CHOLEST SERPL-MCNC: 124 MG/DL (ref 0–200)
CO2 SERPL-SCNC: 31.5 MMOL/L (ref 22–29)
CREAT SERPL-MCNC: 0.87 MG/DL (ref 0.76–1.27)
EGFRCR SERPLBLD CKD-EPI 2021: 90.5 ML/MIN/1.73
GLOBULIN UR ELPH-MCNC: 2.6 GM/DL
GLUCOSE SERPL-MCNC: 131 MG/DL (ref 65–99)
HBA1C MFR BLD: 6.2 % (ref 4.8–5.6)
HDLC SERPL-MCNC: 50 MG/DL (ref 40–60)
LDLC SERPL CALC-MCNC: 49 MG/DL (ref 0–100)
LDLC/HDLC SERPL: 0.88 {RATIO}
POTASSIUM SERPL-SCNC: 3.9 MMOL/L (ref 3.5–5.2)
PROT SERPL-MCNC: 6.5 G/DL (ref 6–8.5)
SODIUM SERPL-SCNC: 138 MMOL/L (ref 136–145)
TRIGL SERPL-MCNC: 149 MG/DL (ref 0–150)
VLDLC SERPL-MCNC: 25 MG/DL (ref 5–40)

## 2023-02-21 PROCEDURE — 80053 COMPREHEN METABOLIC PANEL: CPT

## 2023-02-21 PROCEDURE — 36415 COLL VENOUS BLD VENIPUNCTURE: CPT

## 2023-02-21 PROCEDURE — 83036 HEMOGLOBIN GLYCOSYLATED A1C: CPT

## 2023-02-21 PROCEDURE — 80061 LIPID PANEL: CPT

## 2023-05-05 ENCOUNTER — TELEPHONE (OUTPATIENT)
Dept: CARDIOLOGY | Facility: CLINIC | Age: 73
End: 2023-05-05

## 2023-05-05 ENCOUNTER — OFFICE VISIT (OUTPATIENT)
Dept: CARDIOLOGY | Facility: CLINIC | Age: 73
End: 2023-05-05
Payer: MEDICARE

## 2023-05-05 ENCOUNTER — LAB (OUTPATIENT)
Dept: LAB | Facility: HOSPITAL | Age: 73
End: 2023-05-05
Payer: MEDICARE

## 2023-05-05 ENCOUNTER — HOSPITAL ENCOUNTER (OUTPATIENT)
Dept: CARDIOLOGY | Facility: HOSPITAL | Age: 73
Discharge: HOME | End: 2023-05-05
Payer: MEDICARE

## 2023-05-05 ENCOUNTER — CLINICAL SUPPORT NO REQUIREMENTS (OUTPATIENT)
Dept: CARDIOLOGY | Facility: CLINIC | Age: 73
End: 2023-05-05
Payer: MEDICARE

## 2023-05-05 ENCOUNTER — TELEPHONE (OUTPATIENT)
Dept: CARDIAC SURGERY | Facility: CLINIC | Age: 73
End: 2023-05-05
Payer: MEDICARE

## 2023-05-05 ENCOUNTER — TRANSCRIBE ORDERS (OUTPATIENT)
Dept: CARDIOLOGY | Facility: CLINIC | Age: 73
End: 2023-05-05

## 2023-05-05 VITALS
BODY MASS INDEX: 29.03 KG/M2 | DIASTOLIC BLOOD PRESSURE: 76 MMHG | SYSTOLIC BLOOD PRESSURE: 118 MMHG | HEIGHT: 69 IN | HEART RATE: 67 BPM | WEIGHT: 195.99 LBS

## 2023-05-05 VITALS
HEIGHT: 69 IN | DIASTOLIC BLOOD PRESSURE: 90 MMHG | SYSTOLIC BLOOD PRESSURE: 138 MMHG | BODY MASS INDEX: 29.68 KG/M2 | HEART RATE: 60 BPM | WEIGHT: 200.4 LBS

## 2023-05-05 DIAGNOSIS — I44.1 SECOND DEGREE HEART BLOCK: Primary | ICD-10-CM

## 2023-05-05 DIAGNOSIS — Z01.810 PRE-OPERATIVE CARDIOVASCULAR EXAMINATION: ICD-10-CM

## 2023-05-05 DIAGNOSIS — R06.09 DYSPNEA ON EXERTION: ICD-10-CM

## 2023-05-05 DIAGNOSIS — I35.0 AORTIC STENOSIS, MODERATE: ICD-10-CM

## 2023-05-05 DIAGNOSIS — I35.0 NONRHEUMATIC AORTIC VALVE STENOSIS: Primary | ICD-10-CM

## 2023-05-05 DIAGNOSIS — I71.21 ANEURYSM OF ASCENDING AORTA WITHOUT RUPTURE: ICD-10-CM

## 2023-05-05 DIAGNOSIS — Z13.6 SCREENING FOR ISCHEMIC HEART DISEASE: ICD-10-CM

## 2023-05-05 DIAGNOSIS — Z01.810 PRE-OPERATIVE CARDIOVASCULAR EXAMINATION: Primary | ICD-10-CM

## 2023-05-05 LAB
ANION GAP SERPL CALCULATED.3IONS-SCNC: 8.8 MMOL/L (ref 5–15)
AORTIC ARCH: 2 CM
AORTIC DIMENSIONLESS INDEX: 0.2 (DI)
ASCENDING AORTA: 3.2 CM
BASOPHILS # BLD AUTO: 0.02 10*3/MM3 (ref 0–0.2)
BASOPHILS NFR BLD AUTO: 0.3 % (ref 0–1.5)
BH CV ECHO MEAS - ACS: 0.83 CM
BH CV ECHO MEAS - AO MAX PG: 70.1 MMHG
BH CV ECHO MEAS - AO MEAN PG: 45.1 MMHG
BH CV ECHO MEAS - AO ROOT DIAM: 3.7 CM
BH CV ECHO MEAS - AO V2 MAX: 418.7 CM/SEC
BH CV ECHO MEAS - AO V2 VTI: 111.2 CM
BH CV ECHO MEAS - AVA(I,D): 0.86 CM2
BH CV ECHO MEAS - EDV(CUBED): 157 ML
BH CV ECHO MEAS - EDV(MOD-SP2): 155 ML
BH CV ECHO MEAS - EDV(MOD-SP4): 146 ML
BH CV ECHO MEAS - EF(MOD-BP): 57.8 %
BH CV ECHO MEAS - EF(MOD-SP2): 60 %
BH CV ECHO MEAS - EF(MOD-SP4): 56.2 %
BH CV ECHO MEAS - ESV(CUBED): 63.6 ML
BH CV ECHO MEAS - ESV(MOD-SP2): 62 ML
BH CV ECHO MEAS - ESV(MOD-SP4): 64 ML
BH CV ECHO MEAS - FS: 26 %
BH CV ECHO MEAS - IVS/LVPW: 1.02 CM
BH CV ECHO MEAS - IVSD: 1.04 CM
BH CV ECHO MEAS - LV DIASTOLIC VOL/BSA (35-75): 71.4 CM2
BH CV ECHO MEAS - LV MASS(C)D: 214.1 GRAMS
BH CV ECHO MEAS - LV MAX PG: 6.4 MMHG
BH CV ECHO MEAS - LV MEAN PG: 3.2 MMHG
BH CV ECHO MEAS - LV SYSTOLIC VOL/BSA (12-30): 31.3 CM2
BH CV ECHO MEAS - LV V1 MAX: 126.4 CM/SEC
BH CV ECHO MEAS - LV V1 VTI: 26.5 CM
BH CV ECHO MEAS - LVIDD: 5.4 CM
BH CV ECHO MEAS - LVIDS: 4 CM
BH CV ECHO MEAS - LVOT AREA: 3.6 CM2
BH CV ECHO MEAS - LVOT DIAM: 2.14 CM
BH CV ECHO MEAS - LVPWD: 1.02 CM
BH CV ECHO MEAS - MED PEAK E' VEL: 5.9 CM/SEC
BH CV ECHO MEAS - MV A DUR: 0.13 SEC
BH CV ECHO MEAS - MV A MAX VEL: 99.8 CM/SEC
BH CV ECHO MEAS - MV DEC SLOPE: 326.9 CM/SEC2
BH CV ECHO MEAS - MV DEC TIME: 0.16 MSEC
BH CV ECHO MEAS - MV E MAX VEL: 79.3 CM/SEC
BH CV ECHO MEAS - MV E/A: 0.79
BH CV ECHO MEAS - MV MAX PG: 5.3 MMHG
BH CV ECHO MEAS - MV MEAN PG: 3.4 MMHG
BH CV ECHO MEAS - MV P1/2T: 80.9 MSEC
BH CV ECHO MEAS - MV V2 VTI: 36 CM
BH CV ECHO MEAS - MVA(P1/2T): 2.7 CM2
BH CV ECHO MEAS - MVA(VTI): 2.6 CM2
BH CV ECHO MEAS - PA ACC TIME: 0.1 SEC
BH CV ECHO MEAS - PA PR(ACCEL): 34.6 MMHG
BH CV ECHO MEAS - PA V2 MAX: 77.4 CM/SEC
BH CV ECHO MEAS - PULM A REVS DUR: 0.14 SEC
BH CV ECHO MEAS - PULM A REVS VEL: 27.9 CM/SEC
BH CV ECHO MEAS - PULM DIAS VEL: 37.2 CM/SEC
BH CV ECHO MEAS - PULM S/D: 1.24
BH CV ECHO MEAS - PULM SYS VEL: 46.1 CM/SEC
BH CV ECHO MEAS - QP/QS: 0.78
BH CV ECHO MEAS - RAP SYSTOLE: 8 MMHG
BH CV ECHO MEAS - RV MAX PG: 1.79 MMHG
BH CV ECHO MEAS - RV V1 MAX: 66.8 CM/SEC
BH CV ECHO MEAS - RV V1 VTI: 16.3 CM
BH CV ECHO MEAS - RVOT DIAM: 2.4 CM
BH CV ECHO MEAS - RVSP: 34.6 MMHG
BH CV ECHO MEAS - SI(MOD-SP2): 45.5 ML/M2
BH CV ECHO MEAS - SI(MOD-SP4): 40.1 ML/M2
BH CV ECHO MEAS - SV(LVOT): 95.2 ML
BH CV ECHO MEAS - SV(MOD-SP2): 93 ML
BH CV ECHO MEAS - SV(MOD-SP4): 82 ML
BH CV ECHO MEAS - SV(RVOT): 74.2 ML
BH CV ECHO MEAS - TAPSE (>1.6): 2.44 CM
BH CV ECHO MEAS - TR MAX PG: 26.6 MMHG
BH CV ECHO MEAS - TR MAX VEL: 257.9 CM/SEC
BH CV XLRA - RV BASE: 3.8 CM
BH CV XLRA - RV LENGTH: 7.6 CM
BH CV XLRA - RV MID: 3.2 CM
BH CV XLRA - TDI S': 12.4 CM/SEC
BUN SERPL-MCNC: 14 MG/DL (ref 8–23)
BUN/CREAT SERPL: 15.6 (ref 7–25)
CALCIUM SPEC-SCNC: 9.6 MG/DL (ref 8.6–10.5)
CHLORIDE SERPL-SCNC: 103 MMOL/L (ref 98–107)
CO2 SERPL-SCNC: 29.2 MMOL/L (ref 22–29)
CREAT SERPL-MCNC: 0.9 MG/DL (ref 0.76–1.27)
DEPRECATED RDW RBC AUTO: 41.6 FL (ref 37–54)
EGFRCR SERPLBLD CKD-EPI 2021: 89.6 ML/MIN/1.73
EOSINOPHIL # BLD AUTO: 0.15 10*3/MM3 (ref 0–0.4)
EOSINOPHIL NFR BLD AUTO: 2.1 % (ref 0.3–6.2)
ERYTHROCYTE [DISTWIDTH] IN BLOOD BY AUTOMATED COUNT: 13.6 % (ref 12.3–15.4)
GLUCOSE SERPL-MCNC: 118 MG/DL (ref 65–99)
HCT VFR BLD AUTO: 42.8 % (ref 37.5–51)
HGB BLD-MCNC: 14.5 G/DL (ref 13–17.7)
IMM GRANULOCYTES # BLD AUTO: 0.05 10*3/MM3 (ref 0–0.05)
IMM GRANULOCYTES NFR BLD AUTO: 0.7 % (ref 0–0.5)
LEFT ATRIUM VOLUME INDEX: 39.5 ML/M2
LYMPHOCYTES # BLD AUTO: 1.33 10*3/MM3 (ref 0.7–3.1)
LYMPHOCYTES NFR BLD AUTO: 18.3 % (ref 19.6–45.3)
MAXIMAL PREDICTED HEART RATE: 146 BPM
MCH RBC QN AUTO: 28.3 PG (ref 26.6–33)
MCHC RBC AUTO-ENTMCNC: 33.9 G/DL (ref 31.5–35.7)
MCV RBC AUTO: 83.6 FL (ref 79–97)
MONOCYTES # BLD AUTO: 0.51 10*3/MM3 (ref 0.1–0.9)
MONOCYTES NFR BLD AUTO: 7 % (ref 5–12)
NEUTROPHILS NFR BLD AUTO: 5.19 10*3/MM3 (ref 1.7–7)
NEUTROPHILS NFR BLD AUTO: 71.6 % (ref 42.7–76)
NRBC BLD AUTO-RTO: 0 /100 WBC (ref 0–0.2)
PLATELET # BLD AUTO: 209 10*3/MM3 (ref 140–450)
PMV BLD AUTO: 9.3 FL (ref 6–12)
POTASSIUM SERPL-SCNC: 4.3 MMOL/L (ref 3.5–5.2)
RBC # BLD AUTO: 5.12 10*6/MM3 (ref 4.14–5.8)
SINUS: 3.7 CM
SODIUM SERPL-SCNC: 141 MMOL/L (ref 136–145)
STJ: 2.8 CM
STRESS TARGET HR: 124 BPM
WBC NRBC COR # BLD: 7.25 10*3/MM3 (ref 3.4–10.8)

## 2023-05-05 PROCEDURE — 93306 TTE W/DOPPLER COMPLETE: CPT

## 2023-05-05 PROCEDURE — 85025 COMPLETE CBC W/AUTO DIFF WBC: CPT

## 2023-05-05 PROCEDURE — 80048 BASIC METABOLIC PNL TOTAL CA: CPT

## 2023-05-05 PROCEDURE — 36415 COLL VENOUS BLD VENIPUNCTURE: CPT

## 2023-05-05 PROCEDURE — 25510000001 PERFLUTREN (DEFINITY) 8.476 MG IN SODIUM CHLORIDE (PF) 0.9 % 10 ML INJECTION: Performed by: NURSE PRACTITIONER

## 2023-05-05 PROCEDURE — 93306 TTE W/DOPPLER COMPLETE: CPT | Performed by: INTERNAL MEDICINE

## 2023-05-05 RX ORDER — FINASTERIDE 5 MG/1
5 TABLET, FILM COATED ORAL DAILY
COMMUNITY

## 2023-05-05 RX ADMIN — PERFLUTREN 2 ML: 6.52 INJECTION, SUSPENSION INTRAVENOUS at 10:04

## 2023-05-05 NOTE — H&P (VIEW-ONLY)
Date of Office Visit: 23  Encounter Provider: Loc An MD  Place of Service: [facility] CARDIOLOGY  Patient Name: Alfredo Toure  :1950  [id number]    Chief Complaint   Patient presents with   • Aortic Stenosis   :     HPI: Alfredo Toure is a 72 y.o. male is a gentleman that had high degree AV block 2 years ago and underwent permanent pacemaker with [employer]bign[employer] Temple with his pacing is been great doing fine no issues since has diabetes is trying to work on control  fully vaccinated wants his booster.  This is a gentleman that has had a longstanding murmur likely has a bicuspid aortic valve has a thoracic aortic aneurysm that is being followed by [facility] Torres it is 4.3 cm and its been stable. I saw him 4 months ago he had a peak gradient of 70 and a mean of 40 was pretty asymptomatic.  I walked him on a treadmill he lasted for 5 minutes.  We decided we will going to observe him and see how he does and he comes in for follow-up today    Since that time he now notices that he is more fatigued and is having dyspnea on exertion.  No chest pain no syncope no palpitations no edema.  No bleeding difficulty    Past Medical History:   Diagnosis Date   • Arrhythmia     2ND DEGREE HEART BLOCK   • Colon polyp    • Diabetes mellitus    • GERD (gastroesophageal reflux disease)    • Hyperlipidemia    • Hypertension        Past Surgical History:   Procedure Laterality Date   • ABDOMINAL SURGERY      at [facility]   • CARDIAC CATHETERIZATION     • CARDIAC ELECTROPHYSIOLOGY PROCEDURE Left 2/15/2019    Procedure: Pacemaker DC new- [employer];  Surgeon: Zbigniew Tam MD;  Location:  [facility] CATH INVASIVE LOCATION;  Service: Cardiology   • CHOLECYSTECTOMY     • COLONOSCOPY     • COLONOSCOPY N/A 2021    Procedure: COLONOSCOPY to cecum with cold polypectomy;  Surgeon: Zbigniew Washington MD;  Location: [facility] ENDOSCOPY;  Service: General;  Laterality: N/A;  Pre: h/x  of polyps  Post: diverticulosis. polyp   • ENDOSCOPY N/A 4/26/2021    Procedure: ESOPHAGOGASTRODUODENOSCOPY with biopsies;  Surgeon: Zbigniew Washington MD;  Location: [facility] ENDOSCOPY;  Service: General;  Laterality: N/A;  Pre: reflux  Post: mild gastritis, hiatal hernia   • LUMBAR DISCECTOMY         Social History     Socioeconomic History   • Marital status:    Tobacco Use   • Smoking status: Never   • Smokeless tobacco: Never   • Tobacco comments:     Daily caffeine daily   Vaping Use   • Vaping Use: Never used   Substance and Sexual Activity   • Alcohol use: Yes     Comment: on occasion - monthly.   alcohol is not the problem   • Drug use: No   • Sexual activity: Never       Family History   Problem Relation Age of Onset   • Colon cancer Maternal Uncle    • Malig Hyperthermia Neg Hx        Review of Systems   Constitutional: Negative for decreased appetite, fever, malaise/fatigue and weight loss.   HENT: Negative for nosebleeds.    Eyes: Negative for double vision.   Cardiovascular: Negative for chest pain, claudication, cyanosis, dyspnea on exertion, irregular heartbeat, leg swelling, near-syncope, orthopnea, palpitations, paroxysmal nocturnal dyspnea and syncope.   Respiratory: Negative for cough, hemoptysis and shortness of breath.    Hematologic/Lymphatic: Negative for bleeding problem.   Skin: Negative for rash.   Musculoskeletal: Negative for falls and myalgias.   Gastrointestinal: Negative for hematochezia, jaundice, melena, nausea and vomiting.   Genitourinary: Negative for hematuria.   Neurological: Negative for dizziness and seizures.   Psychiatric/Behavioral: Negative for altered mental status and memory loss.       No Known Allergies      Current Outpatient Medications:   •  amLODIPine (NORVASC) 5 MG tablet, Take 1 tablet by mouth Daily., Disp: , Rfl: 3  •  aspirin 81 MG EC tablet, Take 1 tablet by mouth Daily., Disp: , Rfl:   •  FARXIGA 10 MG tablet, Take 10 mg by mouth Daily., Disp: , Rfl:  "3  •  finasteride (PROSCAR) 5 MG tablet, Take 1 tablet by mouth Daily., Disp: , Rfl:   •  metFORMIN (GLUCOPHAGE) 1000 MG tablet, Take 1 tablet by mouth 2 (Two) Times a Day., Disp: , Rfl: 3  •  omeprazole (PRILOSEC) 40 MG capsule, Take 1 capsule by mouth 2 (Two) Times a Day., Disp: 60 capsule, Rfl: 5  •  rosuvastatin (CRESTOR) 10 MG tablet, Take 1 tablet by mouth Daily., Disp: , Rfl:   •  Semaglutide,0.25 or 0.5MG/DOS, (OZEMPIC) 2 MG/1.5ML solution pen-injector, Inject 0.5 mg under the skin into the appropriate area as directed 1 (One) Time Per Week., Disp: , Rfl:   •  valsartan-hydrochlorothiazide (DIOVAN-HCT) 160-25 MG per tablet, Take 1 tablet by mouth Daily., Disp: , Rfl: 3  No current facility-administered medications for this visit.      Objective:     Vitals:    05/05/23 1007   BP: 138/90   Pulse: 60   Weight: 90.9 kg (200 lb 6.4 oz)   Height: 175.3 cm (69\")     Body mass index is 29.59 kg/m².    Constitutional:       Appearance: Well-developed.   Eyes:      General: No scleral icterus.  HENT:      Head: Normocephalic.   Neck:      Thyroid: No thyromegaly.      Vascular: No JVD.      Lymphadenopathy: No cervical adenopathy.   Pulmonary:      Effort: Pulmonary effort is normal.      Breath sounds: Normal breath sounds. No wheezing. No rales.   Cardiovascular:      Normal rate. Regular rhythm.      Murmurs: There is a harsh crescendo-decrescendo, mid to late systolic murmur at the URSB, radiating to the neck.      No gallop.   Edema:     Peripheral edema absent.   Abdominal:      Palpations: Abdomen is soft.      Tenderness: There is no abdominal tenderness.   Musculoskeletal: Normal range of motion. Skin:     General: Skin is warm and dry.      Findings: No rash.   Neurological:      Mental Status: Alert and oriented to person, place, and time.           ECG 12 Lead    Date/Time: 5/5/2023 10:28 AM  Performed by: Loc An MD  Authorized by: Loc An MD   Comparison: compared with previous ECG "   Similar to previous ECG  Rhythm: paced    Clinical impression: abnormal EKG             Assessment:       Diagnosis Plan   1. Nonrheumatic aortic valve stenosis        2. Aneurysm of ascending aorta without rupture               Plan:       Well I think he is got symptomatic severe degenerative aortic stenosis with a bicuspid aortic valve and moderate aortic aneurysm.  And it is time to move forward with an evaluation and treatment of that.  I think the best option for him is going to be a surgical aortic valve replacement and if they need to do something to the root at that time they can do it.  We are going to move forward and get a cardiac cath on him.  He is interested in having this done at [facility] and so I am going to set him up for a consultation with Dr. Ky Arce.  We will try to get this done sometime in the next month    As always, it has been a pleasure to participate in your patient's care.      Sincerely,       Loc An MD

## 2023-05-05 NOTE — PROGRESS NOTES
Date of Office Visit: 23  Encounter Provider: Loc An MD  Place of Service: [facility] CARDIOLOGY  Patient Name: Alfredo Toure  :[id number]  [id number]    Chief Complaint   Patient presents with   • Aortic Stenosis   :     HPI: Alfredo Toure is a 72 y.o. male is a gentleman that had high degree AV block 2 years ago and underwent permanent pacemaker with Zbigniew Temple with his pacing is been great doing fine no issues since has diabetes is trying to work on control  fully vaccinated wants his booster.  This is a gentleman that has had a longstanding murmur likely has a bicuspid aortic valve has a thoracic aortic aneurysm that is being followed by [facility] it is 4.3 cm and its been stable. I saw him 4 months ago he had a peak gradient of 70 and a mean of 40 was pretty asymptomatic.  I walked him on a treadmill he lasted for 5 minutes.  We decided we will going to observe him and see how he does and he comes in for follow-up today    Since that time he now notices that he is more fatigued and is having dyspnea on exertion.  No chest pain no syncope no palpitations no edema.  No bleeding difficulty    Past Medical History:   Diagnosis Date   • Arrhythmia     2ND DEGREE HEART BLOCK   • Colon polyp    • Diabetes mellitus    • GERD (gastroesophageal reflux disease)    • Hyperlipidemia    • Hypertension        Past Surgical History:   Procedure Laterality Date   • ABDOMINAL SURGERY      at [facility]   • CARDIAC CATHETERIZATION     • CARDIAC ELECTROPHYSIOLOGY PROCEDURE Left 2/15/2019    Procedure: Pacemaker DC new- [employer];  Surgeon: Zbigniew Tam MD;  Location:  [facility];  Service: Cardiology   • CHOLECYSTECTOMY     • COLONOSCOPY     • COLONOSCOPY N/A 2021    Procedure: COLONOSCOPY to cecum with cold polypectomy;  Surgeon: Zbigniew Washington MD;  Location: [facility] ENDOSCOPY;  Service: General;  Laterality: N/A;  Pre: h/x  of polyps  Post: diverticulosis. polyp   • ENDOSCOPY N/A 4/26/2021    Procedure: ESOPHAGOGASTRODUODENOSCOPY with biopsies;  Surgeon: Zbigniew Washington MD;  Location: [facility] ENDOSCOPY;  Service: General;  Laterality: N/A;  Pre: reflux  Post: mild gastritis, hiatal hernia   • LUMBAR DISCECTOMY         Social History     Socioeconomic History   • Marital status:    Tobacco Use   • Smoking status: Never   • Smokeless tobacco: Never   • Tobacco comments:     Daily caffeine daily   Vaping Use   • Vaping Use: Never used   Substance and Sexual Activity   • Alcohol use: Yes     Comment: on occasion - monthly.   alcohol is not the problem   • Drug use: No   • Sexual activity: Never       Family History   Problem Relation Age of Onset   • Colon cancer Maternal Uncle    • Malig Hyperthermia Neg Hx        Review of Systems   Constitutional: Negative for decreased appetite, fever, malaise/fatigue and weight loss.   HENT: Negative for nosebleeds.    Eyes: Negative for double vision.   Cardiovascular: Negative for chest pain, claudication, cyanosis, dyspnea on exertion, irregular heartbeat, leg swelling, near-syncope, orthopnea, palpitations, paroxysmal nocturnal dyspnea and syncope.   Respiratory: Negative for cough, hemoptysis and shortness of breath.    Hematologic/Lymphatic: Negative for bleeding problem.   Skin: Negative for rash.   Musculoskeletal: Negative for falls and myalgias.   Gastrointestinal: Negative for hematochezia, jaundice, melena, nausea and vomiting.   Genitourinary: Negative for hematuria.   Neurological: Negative for dizziness and seizures.   Psychiatric/Behavioral: Negative for altered mental status and memory loss.       No Known Allergies      Current Outpatient Medications:   •  amLODIPine (NORVASC) 5 MG tablet, Take 1 tablet by mouth Daily., Disp: , Rfl: 3  •  aspirin 81 MG EC tablet, Take 1 tablet by mouth Daily., Disp: , Rfl:   •  FARXIGA 10 MG tablet, Take 10 mg by mouth Daily., Disp: , Rfl:  "3  •  finasteride (PROSCAR) 5 MG tablet, Take 1 tablet by mouth Daily., Disp: , Rfl:   •  metFORMIN (GLUCOPHAGE) 1000 MG tablet, Take 1 tablet by mouth 2 (Two) Times a Day., Disp: , Rfl: 3  •  omeprazole (PRILOSEC) 40 MG capsule, Take 1 capsule by mouth 2 (Two) Times a Day., Disp: 60 capsule, Rfl: 5  •  rosuvastatin (CRESTOR) 10 MG tablet, Take 1 tablet by mouth Daily., Disp: , Rfl:   •  Semaglutide,0.25 or 0.5MG/DOS, (OZEMPIC) 2 MG/1.5ML solution pen-injector, Inject 0.5 mg under the skin into the appropriate area as directed 1 (One) Time Per Week., Disp: , Rfl:   •  valsartan-hydrochlorothiazide (DIOVAN-HCT) 160-25 MG per tablet, Take 1 tablet by mouth Daily., Disp: , Rfl: 3  No current facility-administered medications for this visit.      Objective:     Vitals:    05/05/23 1007   BP: 138/90   Pulse: 60   Weight: 90.9 kg (200 lb 6.4 oz)   Height: 175.3 cm (69\")     Body mass index is 29.59 kg/m².    Constitutional:       Appearance: Well-developed.   Eyes:      General: No scleral icterus.  HENT:      Head: Normocephalic.   Neck:      Thyroid: No thyromegaly.      Vascular: No JVD.      Lymphadenopathy: No cervical adenopathy.   Pulmonary:      Effort: Pulmonary effort is normal.      Breath sounds: Normal breath sounds. No wheezing. No rales.   Cardiovascular:      Normal rate. Regular rhythm.      Murmurs: There is a harsh crescendo-decrescendo, mid to late systolic murmur at the URSB, radiating to the neck.      No gallop.   Edema:     Peripheral edema absent.   Abdominal:      Palpations: Abdomen is soft.      Tenderness: There is no abdominal tenderness.   Musculoskeletal: Normal range of motion. Skin:     General: Skin is warm and dry.      Findings: No rash.   Neurological:      Mental Status: Alert and oriented to person, place, and time.           ECG 12 Lead    Date/Time: 5/5/2023 10:28 AM  Performed by: Loc An MD  Authorized by: Loc An MD   Comparison: compared with previous ECG "   Similar to previous ECG  Rhythm: paced    Clinical impression: abnormal EKG             Assessment:       Diagnosis Plan   1. Nonrheumatic aortic valve stenosis        2. Aneurysm of ascending aorta without rupture               Plan:       Well I think he is got symptomatic severe degenerative aortic stenosis with a bicuspid aortic valve and moderate aortic aneurysm.  And it is time to move forward with an evaluation and treatment of that.  I think the best option for him is going to be a surgical aortic valve replacement and if they need to do something to the root at that time they can do it.  We are going to move forward and get a cardiac cath on him.  He is interested in having this done at [facility] and so I am going to set him up for a consultation with Dr. Ky Arce.  We will try to get this done sometime in the next month    As always, it has been a pleasure to participate in your patient's care.      Sincerely,       Loc An MD

## 2023-05-05 NOTE — TELEPHONE ENCOUNTER
Called left message for patient labs look good per WD          Loc An MD Bishop, Rita, MA  Please let him know his labs look good    Associated Results     Contains abnormal data Basic Metabolic Panel  Order: [id number]   Status: Final result      Visible to patient: Yes (seen)      Dx: Pre-operative cardiovascular examinat...     Specimen Information: Blood    1 Result Note            Component  Ref Range & Units 11:26  (5/5/23) 2 mo ago  (2/21/23) 6 mo ago  (11/4/22) 6 mo ago  (11/2/22) 6 mo ago  (11/2/22) 10 mo ago  (6/27/22) 1 yr ago  (3/16/22)   Glucose  65 - 99 mg/dL 118 High   131 High   124 High   107 High  R, CM   117 High   137 High     BUN  8 - 23 mg/dL 14  15  15    16  16    Creatinine  0.76 - 1.27 mg/dL 0.90  0.87  0.95   0.8 R  1.00  0.92    Sodium  136 - 145 mmol/L 141  138  136    138  138    Potassium  3.5 - 5.2 mmol/L 4.3  3.9  4.1    4.0  4.4    Chloride  98 - 107 mmol/L 103  96 Low   98    100  99    CO2  22.0 - 29.0 mmol/L 29.2 High   31.5 High   29.0    27.6  31.0 High     Calcium  8.6 - 10.5 mg/dL 9.6  9.7  9.5    10.2  9.6    BUN/Creatinine Ratio  7.0 - 25.0 15.6  17.2  15.8    16.0  17.4    Anion Gap  5.0 - 15.0 mmol/L 8.8  10.5  9.0    10.4  8.0    eGFR  >60.0 mL/min/1.73 89.6  90.5  84.0 CM    79.5 CM  87.8 CM    Resulting Agency  [facility]  [facility]  [facility]  [facility]  [facility]  [facility]           Narrative  Performed by:  [facility]  GFR Normal >60   Chronic Kidney Disease <60   Kidney Failure <15     The GFR formula is only valid for adults with stable renal function between ages 18 and 70.      Specimen Collected: 05/05/23 11:26 EDT Last Resulted: 05/05/23 12:11 EDT         Lab Flowsheet      Order Details      View Encounter      Lab and Collection Details      Routing      Result History     View Encounter Conversation        CM=Additional comments  R=Reference range differs from displayed range        Result Care Coordination      Result  Notes     Loc An MD   5/5/2023  1:12 PM EDT Back to Top      Please let him know his labs look good       Patient Communication     Released  Seen Back to Top            Contains abnormal data CBC Auto Differential  Order: [id number] - Part of Panel Order [id number]   Status: Final result      Visible to patient: Yes (seen)      Dx: Pre-operative cardiovascular examinat...     Specimen Information: Blood    1 Result Note            Component  Ref Range & Units 11:26  (5/5/23) 1 yr ago  (3/16/22) 2 yr ago  (3/5/21) 3 yr ago  (2/7/20) 4 yr ago  (2/13/19) 4 yr ago  (1/11/19) 5 yr ago  (2/16/18)   WBC  3.40 - 10.80 10*3/mm3 7.25  7.60  6.80  6.92  7.75  7.81 R  7.81 R    RBC  4.14 - 5.80 10*6/mm3 5.12  5.32  5.00  5.52  5.96 High   5.92 R  5.40 R    Hemoglobin  13.0 - 17.7 g/dL 14.5  15.3  14.1  16.3  16.5  16.9 R  15.4 R    Hematocrit  37.5 - 51.0 % 42.8  45.3  42.5  46.4  50.1  52.4 High  R  46.1 R    MCV  79.0 - 97.0 fL 83.6  85.2  85.0  84.1  84.1  88.5 R  85.4 R    MCH  26.6 - 33.0 pg 28.3  28.8  28.2  29.5  27.7  28.5 R  28.5 R    MCHC  31.5 - 35.7 g/dL 33.9  33.8  33.2  35.1  32.9  32.3 Low  R  33.4 R    RDW  12.3 - 15.4 % 13.6  13.6  13.4  13.4  13.5  14.3 R  13.6 R    RDW-SD  37.0 - 54.0 fl 41.6  41.5  41.7  40.6  40.8  45.7  42.0    MPV  6.0 - 12.0 fL 9.3  9.3  9.4  9.6  9.8  10.5  10.1    Platelets  140 - 450 10*3/mm3 209  249  213  237  268  224 R  222 R    Neutrophil %  42.7 - 76.0 % 71.6  69.4  67.1  66.9  71.9  67.8  68.0    Lymphocyte %  19.6 - 45.3 % 18.3 Low   18.3 Low   18.8 Low   19.7  16.3 Low   22.3  17.8 Low     Monocyte %  5.0 - 12.0 % 7.0  7.9  10.1  10.7  8.0  7.4  9.7    Eosinophil %  0.3 - 6.2 % 2.1  2.6  2.5  2.0  1.8  2.2  3.1    Basophil %  0.0 - 1.5 % 0.3  0.7  0.6  0.3  0.5  0.3  0.4    Immature Grans %  0.0 - 0.5 % 0.7 High   1.1 High   0.9 High   0.4  1.5 High   1.0 High   1.0 High     Neutrophils, Absolute  1.70 - 7.00 10*3/mm3 5.19  5.28  4.56  4.63  5.57 R  5.30 R  5.31 R     Lymphocytes, Absolute  0.70 - 3.10 10*3/mm3 1.33  1.39  1.28  1.36  1.26  1.74 R  1.39 R    Monocytes, Absolute  0.10 - 0.90 10*3/mm3 0.51  0.60  0.69  0.74  0.62  0.58 R  0.76 R    Eosinophils, Absolute  0.00 - 0.40 10*3/mm3 0.15  0.20  0.17  0.14  0.14  0.17 R  0.24 R    Basophils, Absolute  0.00 - 0.20 10*3/mm3 0.02  0.05  0.04  0.02  0.04  0.02  0.03    Immature Grans, Absolute  0.00 - 0.05 10*3/mm3 0.05  0.08 High   0.06 High   0.03  0.12 High   0.08 High  R  0.08 High  R    nRBC  0.0 - 0.2 /100 WBC 0.0  0.0  0.0  0.0  0.0 R      Resulting Agency  [facility]  [facility]  [facility]  [facility]  [facility]  [facility]              Specimen Collected: 05/05/23 11:26 EDT Last Resulted: 05/05/23 11:51 EDT         Lab Flowsheet      Order Details      View Encounter      Lab and Collection Details      Routing      Result History     View Encounter Conversation        R=Reference range differs from displayed range

## 2023-05-22 ENCOUNTER — HOSPITAL ENCOUNTER (OUTPATIENT)
Facility: HOSPITAL | Age: 73
Setting detail: HOSPITAL OUTPATIENT SURGERY
Discharge: HOME | End: 2023-05-22
Attending: INTERNAL MEDICINE | Admitting: INTERNAL MEDICINE
Payer: MEDICARE

## 2023-05-22 VITALS
SYSTOLIC BLOOD PRESSURE: 106 MMHG | TEMPERATURE: 97.6 F | OXYGEN SATURATION: 92 % | WEIGHT: 195 LBS | BODY MASS INDEX: 28.88 KG/M2 | DIASTOLIC BLOOD PRESSURE: 69 MMHG | RESPIRATION RATE: 20 BRPM | HEIGHT: 69 IN | HEART RATE: 67 BPM

## 2023-05-22 DIAGNOSIS — I71.21 ANEURYSM OF ASCENDING AORTA WITHOUT RUPTURE: ICD-10-CM

## 2023-05-22 DIAGNOSIS — I35.0 NONRHEUMATIC AORTIC VALVE STENOSIS: ICD-10-CM

## 2023-05-22 LAB — GLUCOSE BLDC GLUCOMTR-MCNC: 127 MG/DL (ref 70–130)

## 2023-05-22 PROCEDURE — C1894 INTRO/SHEATH, NON-LASER: HCPCS | Performed by: INTERNAL MEDICINE

## 2023-05-22 PROCEDURE — C1769 GUIDE WIRE: HCPCS | Performed by: INTERNAL MEDICINE

## 2023-05-22 PROCEDURE — 82948 REAGENT STRIP/BLOOD GLUCOSE: CPT

## 2023-05-22 PROCEDURE — 93458 L HRT ARTERY/VENTRICLE ANGIO: CPT | Performed by: INTERNAL MEDICINE

## 2023-05-22 PROCEDURE — 25510000001 IOPAMIDOL PER 1 ML: Performed by: INTERNAL MEDICINE

## 2023-05-22 PROCEDURE — 99153 MOD SED SAME PHYS/QHP EA: CPT | Performed by: INTERNAL MEDICINE

## 2023-05-22 PROCEDURE — 25010000002 MIDAZOLAM PER 1 MG: Performed by: INTERNAL MEDICINE

## 2023-05-22 PROCEDURE — 99152 MOD SED SAME PHYS/QHP 5/>YRS: CPT | Performed by: INTERNAL MEDICINE

## 2023-05-22 PROCEDURE — 25010000002 FENTANYL CITRATE (PF) 50 MCG/ML SOLUTION: Performed by: INTERNAL MEDICINE

## 2023-05-22 PROCEDURE — 25010000002 HEPARIN (PORCINE) PER 1000 UNITS: Performed by: INTERNAL MEDICINE

## 2023-05-22 RX ORDER — LIDOCAINE HYDROCHLORIDE 20 MG/ML
INJECTION, SOLUTION INFILTRATION; PERINEURAL
Status: DISCONTINUED | OUTPATIENT
Start: 2023-05-22 | End: 2023-05-22 | Stop reason: HOSPADM

## 2023-05-22 RX ORDER — SODIUM CHLORIDE 9 MG/ML
40 INJECTION, SOLUTION INTRAVENOUS AS NEEDED
Status: DISCONTINUED | OUTPATIENT
Start: 2023-05-22 | End: 2023-05-22 | Stop reason: HOSPADM

## 2023-05-22 RX ORDER — SODIUM CHLORIDE 9 MG/ML
250 INJECTION, SOLUTION INTRAVENOUS ONCE AS NEEDED
Status: DISCONTINUED | OUTPATIENT
Start: 2023-05-22 | End: 2023-05-22 | Stop reason: HOSPADM

## 2023-05-22 RX ORDER — SODIUM CHLORIDE 0.9 % (FLUSH) 0.9 %
10 SYRINGE (ML) INJECTION EVERY 12 HOURS SCHEDULED
Status: DISCONTINUED | OUTPATIENT
Start: 2023-05-22 | End: 2023-05-22 | Stop reason: HOSPADM

## 2023-05-22 RX ORDER — ACETAMINOPHEN 325 MG/1
650 TABLET ORAL EVERY 4 HOURS PRN
Status: DISCONTINUED | OUTPATIENT
Start: 2023-05-22 | End: 2023-05-22 | Stop reason: HOSPADM

## 2023-05-22 RX ORDER — SODIUM CHLORIDE 0.9 % (FLUSH) 0.9 %
10 SYRINGE (ML) INJECTION AS NEEDED
Status: DISCONTINUED | OUTPATIENT
Start: 2023-05-22 | End: 2023-05-22 | Stop reason: HOSPADM

## 2023-05-22 RX ORDER — SODIUM CHLORIDE 9 MG/ML
75 INJECTION, SOLUTION INTRAVENOUS CONTINUOUS
Status: DISCONTINUED | OUTPATIENT
Start: 2023-05-22 | End: 2023-05-22 | Stop reason: HOSPADM

## 2023-05-22 RX ORDER — MIDAZOLAM HYDROCHLORIDE 1 MG/ML
INJECTION INTRAMUSCULAR; INTRAVENOUS
Status: DISCONTINUED | OUTPATIENT
Start: 2023-05-22 | End: 2023-05-22 | Stop reason: HOSPADM

## 2023-05-22 RX ORDER — FENTANYL CITRATE 50 UG/ML
INJECTION, SOLUTION INTRAMUSCULAR; INTRAVENOUS
Status: DISCONTINUED | OUTPATIENT
Start: 2023-05-22 | End: 2023-05-22 | Stop reason: HOSPADM

## 2023-05-22 RX ORDER — HEPARIN SODIUM 1000 [USP'U]/ML
INJECTION, SOLUTION INTRAVENOUS; SUBCUTANEOUS
Status: DISCONTINUED | OUTPATIENT
Start: 2023-05-22 | End: 2023-05-22 | Stop reason: HOSPADM

## 2023-05-22 RX ORDER — VERAPAMIL HYDROCHLORIDE 2.5 MG/ML
INJECTION, SOLUTION INTRAVENOUS
Status: DISCONTINUED | OUTPATIENT
Start: 2023-05-22 | End: 2023-05-22 | Stop reason: HOSPADM

## 2023-05-22 RX ADMIN — SODIUM CHLORIDE 75 ML/HR: 9 INJECTION, SOLUTION INTRAVENOUS at 08:10

## 2023-05-22 RX ADMIN — ACETAMINOPHEN 650 MG: 325 TABLET, FILM COATED ORAL at 12:25

## 2023-05-22 NOTE — Clinical Note
Hemostasis started on the right radial artery. Manual pressure applied to vessel. Manual pressure was held by AW RTR. Manual pressure was held for 5 min. Hemostasis achieved successfully.

## 2023-05-22 NOTE — DISCHARGE INSTRUCTIONS
[facility]  [address] [place] [address]    Coronary Angiogram (Radial/Ulnar Approach) After Care    Refer to this sheet in the next few weeks. These instructions provide you with information on caring for yourself after your procedure. Your caregiver may also give you more specific instructions. Your treatment has been planned according to current medical practices, but problems sometimes occur. Call your caregiver if you have any problems or questions after your procedure.    Home Care Instructions:  You may shower the day after the procedure. Remove the bandage (dressing) and gently wash the site with plain soap and water. Gently pat the site dry. You may apply a band aid daily for 2 days if desired.    Do not apply powder or lotion to the site.  Do not submerge the affected site in water for 3 to 5 days or until the site is completely healed.   Do not lift, push or pull anything over 5 pounds for 5 days after your procedure or as directed by your physician.  As a reference, a gallon of milk weighs 8 pounds.   Inspect the site at least twice daily. You may notice some bruising at the site and it may be tender for 1 to 2 weeks.     Increase your fluid intake for the next 2 days.    Keep arm elevated for 24 hours. For the remainder of the day, keep your arm in “Pledge of Allegiance” position when up and about.     You may drive 24 hours after the procedure unless otherwise instructed by your caregiver.  Do not operate machinery or power tools for 24 hours.  A responsible adult should be with you for the first 24 hours after you arrive home. Do not make any important legal decisions or sign legal papers for 24 hours.  Do not drink alcohol for 24 hours.    Metformin or any medications containing Metformin should not be taken for 48 hours after your procedure.      Call Your Doctor if:   You have unusual pain at the radial/ulnar (wrist) site.  You have redness, warmth, swelling, or pain at the  radial/ulnar (wrist) site.  You have drainage (other than a small amount of blood on the dressing).  `You have chills or a fever > 101.  Your arm becomes pale or dark, cool, tingly, or numb.  You develop chest pain, shortness of breath, feel faint or pass out.    You have heavy bleeding from the site, hold pressure on the site for 20 minutes.  If the bleeding stops, apply a fresh bandage and call your cardiologist.  However, if you        continue to have bleeding, call 911 and continue to apply pressure to the site.   You have any symptoms of a stroke.  Remember BE FAST  B-balance. Sudden trouble walking or loss of balance.  E-eyes.  Sudden changes in how you see or a sudden onset of a very bad headache.   F-face. Sudden weakness or loss of feeling of the face or facial droop on one side.   A-arms Sudden weakness or numbness in one arm.  One arm drifts down if they are both held out in front of you. This happens suddenly and usually on one side of the body.   S-speech.  Sudden trouble speaking, slurred speech or trouble understanding what are saying.   T-time  Time to call emergency services.  Write down the symptoms and the time they started.      [facility]  [address] [place] [address]      Coronary Angiogram (Femoral Approach) After Care     Refer to this sheet in the next few weeks. These instructions provide you with information on caring for yourself after your procedure. Your health care provider may also give you more specific instructions. Your treatment has been planned according to current medical practices, but problems sometimes occur. Call your health care provider if you have any problems or questions after your procedure.      What to Expect After the Procedure:  After your procedure, it is typical to have the following sensations:  Minor discomfort or tenderness and a small bump at the catheter insertion site. The bump should usually decrease in size and tenderness within 1  to 2 weeks.  Any bruising will usually fade within 2 to 4 weeks.    Home Care Instructions:  Do not apply powder or lotion to the site.  Do not take baths, swim, or use a hot tub until your health care provider approves and the site is completely healed.  Do not bend, squat, or lift anything over 20 lb (9 kg) or as directed by your health care provider. However, we recommend lifting nothing heavier than a gallon of milk.    You may shower 24 hours after the procedure. Remove the bandage (dressing) and gently wash the site with plain soap and water. Gently pat the site dry. You may apply a band aid daily for 2 days if desired.    Inspect the site at least twice daily.  Increase your fluid intake for the next 2 days.    Limit your activity for the first 48 hours. .    Avoid strenuous activity for 1 week or as advised by your physician.    Follow instructions about when you can drive or return to work as directed by your physician.    Hold direct pressure over the site when you cough, sneeze, laugh or change positions.  Do this for the next 2 days.    Do not operate machinery or power tools for 24 hours.  A responsible adult should be with you for the first 24 hours after you arrive home. Do not make any important legal decisions or sign legal papers for 24 hours.  Do not drink alcohol for 24 hours.  Metformin or any medications containing Metformin should not be taken for 48 hours after your procedure.      Call Your Doctor If:  You have drainage (other than a small amount of blood on the dressing).  You have chills or a fever > 101.  You have redness, warmth, swelling(larger than a walnut), or pain at the insertion site  You develop chest pain or shortness of breath, feel faint, or pass out.  You develop pain, discoloration, coldness, numbness, tingling, or severe bruising in the leg that held the catheter.  You develop bleeding from any other place, such as the bowels.  You have heavy bleeding from the site, hold  pressure on the site for 20 minutes.  If the bleeding stops, apply a fresh bandage and call your cardiologist.  However, if you continue to have bleeding, call 911.  You have any symptoms of a stroke.  Remember BE FAST  B-balance. Sudden trouble walking or loss of balance.  E-eyes.  Sudden changes in how you see or a sudden onset of a very bad headache.   F-face. Sudden weakness or loss of feeling of the face or facial droop on one side.   A-arms Sudden weakness or numbness in one arm.  One arm drifts down if they are both held out in front of you. This happens suddenly and usually on one side of the body.  S-speech.  Sudden trouble speaking, slurred speech or trouble understanding what people are saying.   T-time  Time to call emergency services.  Write down the symptoms and the time they started.        Make Sure You:  Understand these instructions.  Will watch your condition.  Will get help right away if you are not doing well or get worse.

## 2023-05-22 NOTE — Clinical Note
A 5 fr sheath was  inserted using micropuncture technique with ultrasound guidance into the right femoral artery. Sheath insertion not delayed.

## 2023-05-22 NOTE — INTERVAL H&P NOTE
H&P reviewed. The patient was examined and there are no changes to the H&P. I have explained the risks and benefits of the procedure to the patient.  The patient understands and agrees to proceed

## 2023-05-23 ENCOUNTER — OFFICE VISIT (OUTPATIENT)
Dept: CARDIAC SURGERY | Facility: CLINIC | Age: 73
End: 2023-05-23

## 2023-05-23 VITALS
HEART RATE: 70 BPM | WEIGHT: 195 LBS | BODY MASS INDEX: 28.88 KG/M2 | HEIGHT: 69 IN | SYSTOLIC BLOOD PRESSURE: 120 MMHG | RESPIRATION RATE: 20 BRPM | TEMPERATURE: 97.5 F | OXYGEN SATURATION: 95 % | DIASTOLIC BLOOD PRESSURE: 78 MMHG

## 2023-05-23 DIAGNOSIS — I71.21 ANEURYSM OF ASCENDING AORTA WITHOUT RUPTURE: ICD-10-CM

## 2023-05-23 DIAGNOSIS — E11.8 TYPE II DIABETES MELLITUS WITH COMPLICATION: ICD-10-CM

## 2023-05-23 DIAGNOSIS — I44.1 SECOND DEGREE HEART BLOCK: ICD-10-CM

## 2023-05-23 DIAGNOSIS — R06.09 DYSPNEA ON EXERTION: ICD-10-CM

## 2023-05-23 DIAGNOSIS — I10 ESSENTIAL HYPERTENSION: ICD-10-CM

## 2023-05-23 DIAGNOSIS — R53.83 OTHER FATIGUE: ICD-10-CM

## 2023-05-23 DIAGNOSIS — I35.0 NONRHEUMATIC AORTIC VALVE STENOSIS: ICD-10-CM

## 2023-05-23 DIAGNOSIS — Z95.0 HISTORY OF PERMANENT CARDIAC PACEMAKER PLACEMENT: Primary | ICD-10-CM

## 2023-05-23 NOTE — LETTER
May 30, 2023     Loc An MD  [address]  [facility] [address]    Patient: Alfredo Toure   YOB: 1950   Date of Visit: 5/23/2023       Dear Dr. Juve MD:    Thank you for referring Alfredo Toure to me for evaluation. Below are the relevant portions of my assessment and plan of care.    If you have questions, please do not hesitate to call me. I look forward to following Alfredo along with you.         Sincerely,        Ky Arce MD        CC: MD Claude Dawkins Sebastian, MD  05/30/23 1406  Sign when Signing Visit  5/30/2023    Seen on 5/23/2023    Chief Complaint     Evaluation of aortic stenosis and aortic enlargement    History of Present Illness:       Dear Iván and Colleagues,  It was nice to see Alfredo Toure in consultation at your request. He is a 72 y.o. male with diabetes, hypertension, symptomatic bradycardia requiring a pacemaker, GERD, hyperlipidemia and a known proximal aortic dilatation who has been followed for a heart murmur and he was diagnosed 4 months ago with severe aortic stenosis but he was asymptomatic.  In the interval, he developed fatigue and dyspnea with exertion.  He denies chest pain, syncope, TIA, orthopnea or PND.  His symptoms have been progressive and they are moderate at this point. He denies a history of scarlet or rheumatic fever, endocarditis or IV drug use.  The treadmill test and he lasted only 5 minutes due to dyspnea.  Following, he had a 2D echocardiogram that showed severe aortic stenosis with a peak gradient of 70 mmHg, and mean gradient of 40 mmHg, and aortic valve area 0.5 cm², a peak velocity of 4.1 m/s and an ejection fraction of 68%.  The valve appears to be tricuspid.  He had a cardiac cath that showed two-vessel coronary artery disease with significant disease in an obtuse marginal and the LAD.  He was seen by another surgeon elsewhere for his aorta and the last diameter was read as 4.6 cm.    Patient Active  Problem List   Diagnosis   • Second degree heart block   • Other fatigue   • RBBB   • Dyspnea on exertion   • Symptomatic bradycardia   • History of permanent cardiac pacemaker placement   • Nonrheumatic aortic valve stenosis   • Aneurysm of ascending aorta without rupture   • Type II diabetes mellitus with complication   • Essential hypertension       Past Medical History:   Diagnosis Date   • Arrhythmia     2ND DEGREE HEART BLOCK   • Colon polyp    • Diabetes mellitus    • GERD (gastroesophageal reflux disease)    • Hyperlipidemia    • Hypertension        Past Surgical History:   Procedure Laterality Date   • ABDOMINAL SURGERY      at [facility]   • CARDIAC CATHETERIZATION     • CARDIAC CATHETERIZATION N/A 5/22/2023    Procedure: Coronary angiography;  Surgeon: Loc An MD;  [facility];  Service: Cardiology;  Laterality: N/A;   • CARDIAC CATHETERIZATION N/A 5/22/2023    Procedure: Left Heart Cath;  Surgeon: Loc An MD;  Location: [facility] CATH INVASIVE LOCATION;  Service: Cardiology;  Laterality: N/A;   • CARDIAC ELECTROPHYSIOLOGY PROCEDURE Left 2/15/2019    Procedure: Pacemaker DC new- [employer];  Surgeon: Zbigniew Tam MD;  Location: [facility] CATH INVASIVE LOCATION;  Service: Cardiology   • CHOLECYSTECTOMY     • COLONOSCOPY  2014   • COLONOSCOPY N/A 4/26/2021    Procedure: COLONOSCOPY to cecum with cold polypectomy;  Surgeon: Zbigniew Washington MD;  Location: [facility] ENDOSCOPY;  Service: General;  Laterality: N/A;  Pre: h/x of polyps  Post: diverticulosis. polyp   • ENDOSCOPY N/A 4/26/2021    Procedure: ESOPHAGOGASTRODUODENOSCOPY with biopsies;  Surgeon: Zbigniew Washington MD;  Location: [facility] ENDOSCOPY;  Service: General;  Laterality: N/A;  Pre: reflux  Post: mild gastritis, hiatal hernia   • LUMBAR DISCECTOMY         No Known Allergies      Current Outpatient Medications:   •  amLODIPine (NORVASC) 5 MG tablet, Take 1 tablet by mouth Daily., Disp: ,  Rfl: 3  •  aspirin 81 MG EC tablet, Take 1 tablet by mouth Daily., Disp: , Rfl:   •  FARXIGA 10 MG tablet, Take 10 mg by mouth Daily., Disp: , Rfl: 3  •  finasteride (PROSCAR) 5 MG tablet, Take 1 tablet by mouth Daily., Disp: , Rfl:   •  metFORMIN (GLUCOPHAGE) 1000 MG tablet, Take 1 tablet by mouth 2 (Two) Times a Day., Disp: , Rfl: 3  •  omeprazole (PRILOSEC) 40 MG capsule, Take 1 capsule by mouth 2 (Two) Times a Day., Disp: 60 capsule, Rfl: 5  •  rosuvastatin (CRESTOR) 10 MG tablet, Take 1 tablet by mouth Daily., Disp: , Rfl:   •  valsartan-hydrochlorothiazide (DIOVAN-HCT) 160-25 MG per tablet, Take 1 tablet by mouth Daily., Disp: , Rfl: 3    Social History     Socioeconomic History   • Marital status:    Tobacco Use   • Smoking status: Never   • Smokeless tobacco: Never   • Tobacco comments:     Daily caffeine daily   Vaping Use   • Vaping Use: Never used   Substance and Sexual Activity   • Alcohol use: Yes     Comment: on occasion - monthly.   • Drug use: No   • Sexual activity: Never       Family History   Problem Relation Age of Onset   • Colon cancer Maternal Uncle    • Malig Hyperthermia Neg Hx      Review of Systems   Constitutional: Positive for fatigue.   Respiratory: Positive for shortness of breath.    Cardiovascular: Negative for chest pain.   Neurological: Negative for dizziness and weakness.   All other systems reviewed and are negative.      Physical Exam:    Vital Signs:  Weight: 88.5 kg (195 lb)   Body mass index is 28.8 kg/m².  Temp: 97.5 °F (36.4 °C)   Heart Rate: 70   BP: 120/78     Constitutional:       Appearance: Well-developed.   Eyes:      Conjunctiva/sclera: Conjunctivae normal.      Pupils: Pupils are equal, round, and reactive to light.   HENT:      Head: Normocephalic and atraumatic.      Nose: Nose normal.   Neck:      Thyroid: No thyromegaly.      Vascular: No JVD.      Lymphadenopathy: No cervical adenopathy.   Pulmonary:      Effort: Pulmonary effort is normal.      Breath  sounds: Normal breath sounds. No rales.   Cardiovascular:      PMI at left midclavicular line. Normal rate. Regular rhythm.      Murmurs: There is a harsh midsystolic murmur at the URSB, radiating to the neck.      No gallop.   Pulses:     Intact distal pulses. No decreased pulses.   Edema:     Peripheral edema absent.   Abdominal:      General: Bowel sounds are normal. There is no distension.      Palpations: Abdomen is soft. There is no abdominal mass.      Tenderness: There is no abdominal tenderness.   Musculoskeletal: Normal range of motion.         General: No tenderness or deformity.      Cervical back: Normal range of motion and neck supple. Skin:     General: Skin is warm and dry.      Findings: No erythema or rash.   Neurological:      Mental Status: Alert and oriented to person, place, and time.      Deep Tendon Reflexes: Reflexes are normal and symmetric.   Psychiatric:         Behavior: Behavior normal.          Assessment:     Problems Addressed this Visit        Cardiac and Vasculature    Second degree heart block    Dyspnea on exertion    History of permanent cardiac pacemaker placement - Primary    Nonrheumatic aortic valve stenosis    Essential hypertension       Endocrine and Metabolic    Type II diabetes mellitus with complication       Symptoms and Signs    Other fatigue       Other    Aneurysm of ascending aorta without rupture   Diagnoses       Codes Comments    History of permanent cardiac pacemaker placement    -  Primary ICD-10-CM: Z95.0  ICD-9-CM: V45.01     Nonrheumatic aortic valve stenosis     ICD-10-CM: I35.0  ICD-9-CM: 424.1     Essential hypertension     ICD-10-CM: I10  ICD-9-CM: 401.9     Type II diabetes mellitus with complication     ICD-10-CM: E11.8  ICD-9-CM: 250.90     Other fatigue     ICD-10-CM: R53.83  ICD-9-CM: 780.79     Aneurysm of ascending aorta without rupture     ICD-10-CM: I71.21  ICD-9-CM: 441.2     Dyspnea on exertion     ICD-10-CM: R06.09  ICD-9-CM: 786.09      Second degree heart block     ICD-10-CM: I44.1  ICD-9-CM: 426.13         Assessment/recommendation:     Severe symptomatic aortic stenosis with documented elevated gradients and aortic valve area less than 1 cm².  It is unclear whether the valve is bicuspid or trileaflet, it seems to be the latter.  I recommend aortic valve replacement with a biologic prosthesis to prolong survival, symptomatic relief and to prevent adverse events.  I discussed the risks, benefits and alternatives of surgery and he wishes to proceed.  I discussed the STS calculated risk which is very low.  He has an associated ascending aortic aneurysm close to 4.9 cm.  There is no dissection or ulceration.  I recommend concomitant aneurysm repair either by ascending aortic graft interposition repair versus composite aortic valve conduit.  Severe two-vessel coronary artery disease.  He failed a treadmill test.  I recommend CABG with LIMA to the LAD and vein to the OM as part of the surgical procedure.  Hypertension, well controlled, continue same regimen    Thank you for allowing me to participate in his care.    Regards,    Ky Arce M.D.    I spent over 65 minutes before, during and after the office visit in reviewing the records, examining the patient, reviewing and interpreting myself the cardiac cath, and echocardiogram, discussing the findings and options with the patient, reviewing the CTA of the chest, discussing my recommendation of proximal thoracic aorta replacement and CABG, discussing the risk and benefits and discussing in detail the risk of mortality and complications that include but are not limited to bleeding, reoperation, renal failure, respiratory failure, valve failure, prolonged hospitalization and ventilation among others.  I also spent time in documenting in the electronic record

## 2023-05-24 ENCOUNTER — PATIENT ROUNDING (BHMG ONLY) (OUTPATIENT)
Dept: CARDIAC SURGERY | Facility: CLINIC | Age: 73
End: 2023-05-24
Payer: MEDICARE

## 2023-05-24 NOTE — PROGRESS NOTES
A My Chart message has been sent to the patient for PATIENT ROUNDING with [facility]

## 2023-05-25 ENCOUNTER — TELEPHONE (OUTPATIENT)
Dept: CARDIAC SURGERY | Facility: CLINIC | Age: 73
End: 2023-05-25
Payer: MEDICARE

## 2023-05-26 ENCOUNTER — PREP FOR SURGERY (OUTPATIENT)
Dept: OTHER | Facility: HOSPITAL | Age: 73
End: 2023-05-26
Payer: MEDICARE

## 2023-05-26 DIAGNOSIS — I24.8 OTHER FORMS OF ACUTE ISCHEMIC HEART DISEASE: ICD-10-CM

## 2023-05-26 DIAGNOSIS — R79.9 ABNORMAL FINDING OF BLOOD CHEMISTRY, UNSPECIFIED: ICD-10-CM

## 2023-05-26 DIAGNOSIS — I25.119 CORONARY ARTERY DISEASE INVOLVING NATIVE HEART WITH ANGINA PECTORIS, UNSPECIFIED VESSEL OR LESION TYPE: ICD-10-CM

## 2023-05-26 DIAGNOSIS — I71.21 ANEURYSM OF ASCENDING AORTA WITHOUT RUPTURE: ICD-10-CM

## 2023-05-26 DIAGNOSIS — R79.1 ABNORMAL COAGULATION PROFILE: ICD-10-CM

## 2023-05-26 DIAGNOSIS — I79.8 OTHER DISORDERS OF ARTERIES, ARTERIOLES AND CAPILLARIES IN DISEASES CLASSIFIED ELSEWHERE: ICD-10-CM

## 2023-05-26 DIAGNOSIS — Z01.818 PREOPERATIVE TESTING: Primary | ICD-10-CM

## 2023-05-26 DIAGNOSIS — I50.32 CHRONIC DIASTOLIC (CONGESTIVE) HEART FAILURE: ICD-10-CM

## 2023-05-26 RX ORDER — CHLORHEXIDINE GLUCONATE 0.12 MG/ML
15 RINSE ORAL EVERY 12 HOURS
OUTPATIENT
Start: 2023-05-26 | End: 2023-05-27

## 2023-05-26 RX ORDER — CHLORHEXIDINE GLUCONATE 0.12 MG/ML
15 RINSE ORAL ONCE
OUTPATIENT
Start: 2023-05-26 | End: 2023-05-26

## 2023-05-26 RX ORDER — CEFAZOLIN SODIUM 2 G/100ML
2 INJECTION, SOLUTION INTRAVENOUS ONCE
OUTPATIENT
Start: 2023-05-26 | End: 2023-05-26

## 2023-05-26 RX ORDER — CHLORHEXIDINE GLUCONATE 500 MG/1
1 CLOTH TOPICAL EVERY 12 HOURS PRN
OUTPATIENT
Start: 2023-05-26

## 2023-05-30 ENCOUNTER — TELEPHONE (OUTPATIENT)
Dept: CARDIAC SURGERY | Facility: CLINIC | Age: 73
End: 2023-05-30

## 2023-05-30 NOTE — TELEPHONE ENCOUNTER
Spoke with pt surgery scheduled for 06/14/2023 5am arrival, PAT on 06/12/2023 @ 8am. Pt agreeable.

## 2023-05-30 NOTE — PROGRESS NOTES
5/30/2023    Seen on 5/23/2023    Chief Complaint     Evaluation of aortic stenosis and aortic enlargement    History of Present Illness:       Dear Iván and Colleagues,  It was nice to see Alfredo MENSAH Mesfin in consultation at your request. He is a 72 y.o. male with diabetes, hypertension, symptomatic bradycardia requiring a pacemaker, GERD, hyperlipidemia and a known proximal aortic dilatation who has been followed for a heart murmur and he was diagnosed 4 months ago with severe aortic stenosis but he was asymptomatic.  In the interval, he developed fatigue and dyspnea with exertion.  He denies chest pain, syncope, TIA, orthopnea or PND.  His symptoms have been progressive and they are moderate at this point. He denies a history of scarlet or rheumatic fever, endocarditis or IV drug use.  The treadmill test and he lasted only 5 minutes due to dyspnea.  Following, he had a 2D echocardiogram that showed severe aortic stenosis with a peak gradient of 70 mmHg, and mean gradient of 40 mmHg, and aortic valve area 0.5 cm², a peak velocity of 4.1 m/s and an ejection fraction of 68%.  The valve appears to be tricuspid.  He had a cardiac cath that showed two-vessel coronary artery disease with significant disease in an obtuse marginal and the LAD.  He was seen by another surgeon elsewhere for his aorta and the last diameter was read as 4.6 cm.    Patient Active Problem List   Diagnosis   • Second degree heart block   • Other fatigue   • RBBB   • Dyspnea on exertion   • Symptomatic bradycardia   • History of permanent cardiac pacemaker placement   • Nonrheumatic aortic valve stenosis   • Aneurysm of ascending aorta without rupture   • Type II diabetes mellitus with complication   • Essential hypertension       Past Medical History:   Diagnosis Date   • Arrhythmia     2ND DEGREE HEART BLOCK   • Colon polyp    • Diabetes mellitus    • GERD (gastroesophageal reflux disease)    • Hyperlipidemia    • Hypertension        Past Surgical  History:   Procedure Laterality Date   • ABDOMINAL SURGERY      at [facility]   • CARDIAC CATHETERIZATION     • CARDIAC CATHETERIZATION N/A 5/22/2023    Procedure: Coronary angiography;  Surgeon: Loc An MD;  Location:  [facility] CATH INVASIVE LOCATION;  Service: Cardiology;  Laterality: N/A;   • CARDIAC CATHETERIZATION N/A 5/22/2023    Procedure: Left Heart Cath;  Surgeon: Loc An MD;  Location:  [facility] CATH INVASIVE LOCATION;  Service: Cardiology;  Laterality: N/A;   • CARDIAC ELECTROPHYSIOLOGY PROCEDURE Left 2/15/2019    Procedure: Pacemaker DC new- [employer];  Surgeon: Zbigniew Tam MD;  Location:  [facility] CATH INVASIVE LOCATION;  Service: Cardiology   • CHOLECYSTECTOMY     • COLONOSCOPY  2014   • COLONOSCOPY N/A 4/26/2021    Procedure: COLONOSCOPY to cecum with cold polypectomy;  Surgeon: Zbigniew Washington MD;  [facility];  Service: General;  Laterality: N/A;  Pre: h/x of polyps  Post: diverticulosis. polyp   • ENDOSCOPY N/A 4/26/2021    Procedure: ESOPHAGOGASTRODUODENOSCOPY with biopsies;  Surgeon: Zbigniew Washington MD;  [facility];  Service: General;  Laterality: N/A;  Pre: reflux  Post: mild gastritis, hiatal hernia   • LUMBAR DISCECTOMY         No Known Allergies      Current Outpatient Medications:   •  amLODIPine (NORVASC) 5 MG tablet, Take 1 tablet by mouth Daily., Disp: , Rfl: 3  •  aspirin 81 MG EC tablet, Take 1 tablet by mouth Daily., Disp: , Rfl:   •  FARXIGA 10 MG tablet, Take 10 mg by mouth Daily., Disp: , Rfl: 3  •  finasteride (PROSCAR) 5 MG tablet, Take 1 tablet by mouth Daily., Disp: , Rfl:   •  metFORMIN (GLUCOPHAGE) 1000 MG tablet, Take 1 tablet by mouth 2 (Two) Times a Day., Disp: , Rfl: 3  •  omeprazole (PRILOSEC) 40 MG capsule, Take 1 capsule by mouth 2 (Two) Times a Day., Disp: 60 capsule, Rfl: 5  •  rosuvastatin (CRESTOR) 10 MG tablet, Take 1 tablet by mouth Daily., Disp: , Rfl:   •  valsartan-hydrochlorothiazide  (DIOVAN-HCT) 160-25 MG per tablet, Take 1 tablet by mouth Daily., Disp: , Rfl: 3    Social History     Socioeconomic History   • Marital status:    Tobacco Use   • Smoking status: Never   • Smokeless tobacco: Never   • Tobacco comments:     Daily caffeine daily   Vaping Use   • Vaping Use: Never used   Substance and Sexual Activity   • Alcohol use: Yes     Comment: on occasion - monthly.   • Drug use: No   • Sexual activity: Never       Family History   Problem Relation Age of Onset   • Colon cancer Maternal Uncle    • Malig Hyperthermia Neg Hx      Review of Systems   Constitutional: Positive for fatigue.   Respiratory: Positive for shortness of breath.    Cardiovascular: Negative for chest pain.   Neurological: Negative for dizziness and weakness.   All other systems reviewed and are negative.      Physical Exam:    Vital Signs:  Weight: 88.5 kg (195 lb)   Body mass index is 28.8 kg/m².  Temp: 97.5 °F (36.4 °C)   Heart Rate: 70   BP: 120/78     Constitutional:       Appearance: Well-developed.   Eyes:      Conjunctiva/sclera: Conjunctivae normal.      Pupils: Pupils are equal, round, and reactive to light.   HENT:      Head: Normocephalic and atraumatic.      Nose: Nose normal.   Neck:      Thyroid: No thyromegaly.      Vascular: No JVD.      Lymphadenopathy: No cervical adenopathy.   Pulmonary:      Effort: Pulmonary effort is normal.      Breath sounds: Normal breath sounds. No rales.   Cardiovascular:      PMI at left midclavicular line. Normal rate. Regular rhythm.      Murmurs: There is a harsh midsystolic murmur at the URSB, radiating to the neck.      No gallop.   Pulses:     Intact distal pulses. No decreased pulses.   Edema:     Peripheral edema absent.   Abdominal:      General: Bowel sounds are normal. There is no distension.      Palpations: Abdomen is soft. There is no abdominal mass.      Tenderness: There is no abdominal tenderness.   Musculoskeletal: Normal range of motion.         General:  No tenderness or deformity.      Cervical back: Normal range of motion and neck supple. Skin:     General: Skin is warm and dry.      Findings: No erythema or rash.   Neurological:      Mental Status: Alert and oriented to person, place, and time.      Deep Tendon Reflexes: Reflexes are normal and symmetric.   Psychiatric:         Behavior: Behavior normal.          Assessment:     Problems Addressed this Visit        Cardiac and Vasculature    Second degree heart block    Dyspnea on exertion    History of permanent cardiac pacemaker placement - Primary    Nonrheumatic aortic valve stenosis    Essential hypertension       Endocrine and Metabolic    Type II diabetes mellitus with complication       Symptoms and Signs    Other fatigue       Other    Aneurysm of ascending aorta without rupture   Diagnoses       Codes Comments    History of permanent cardiac pacemaker placement    -  Primary ICD-10-CM: Z95.0  ICD-9-CM: V45.01     Nonrheumatic aortic valve stenosis     ICD-10-CM: I35.0  ICD-9-CM: 424.1     Essential hypertension     ICD-10-CM: I10  ICD-9-CM: 401.9     Type II diabetes mellitus with complication     ICD-10-CM: E11.8  ICD-9-CM: 250.90     Other fatigue     ICD-10-CM: R53.83  ICD-9-CM: 780.79     Aneurysm of ascending aorta without rupture     ICD-10-CM: I71.21  ICD-9-CM: 441.2     Dyspnea on exertion     ICD-10-CM: R06.09  ICD-9-CM: 786.09     Second degree heart block     ICD-10-CM: I44.1  ICD-9-CM: 426.13         Assessment/recommendation:     1. Severe symptomatic aortic stenosis with documented elevated gradients and aortic valve area less than 1 cm².  It is unclear whether the valve is bicuspid or trileaflet, it seems to be the latter.  I recommend aortic valve replacement with a biologic prosthesis to prolong survival, symptomatic relief and to prevent adverse events.  I discussed the risks, benefits and alternatives of surgery and he wishes to proceed.  I discussed the STS calculated risk which is  very low.  He has an associated ascending aortic aneurysm close to 4.9 cm.  There is no dissection or ulceration.  I recommend concomitant aneurysm repair either by ascending aortic graft interposition repair versus composite aortic valve conduit.  2. Severe two-vessel coronary artery disease.  He failed a treadmill test.  I recommend CABG with LIMA to the LAD and vein to the OM as part of the surgical procedure.  3. Hypertension, well controlled, continue same regimen    Thank you for allowing me to participate in his care.    Regards,    Ky Arce M.D.    I spent over 65 minutes before, during and after the office visit in reviewing the records, examining the patient, reviewing and interpreting myself the cardiac cath, and echocardiogram, discussing the findings and options with the patient, reviewing the CTA of the chest, discussing my recommendation of proximal thoracic aorta replacement and CABG, discussing the risk and benefits and discussing in detail the risk of mortality and complications that include but are not limited to bleeding, reoperation, renal failure, respiratory failure, valve failure, prolonged hospitalization and ventilation among others.  I also spent time in documenting in the electronic record

## 2023-06-12 ENCOUNTER — HOSPITAL ENCOUNTER (OUTPATIENT)
Dept: CARDIOLOGY | Facility: HOSPITAL | Age: 73
Discharge: HOME | End: 2023-06-12
Payer: MEDICARE

## 2023-06-12 ENCOUNTER — PRE-ADMISSION TESTING (OUTPATIENT)
Dept: PREADMISSION TESTING | Facility: HOSPITAL | Age: 73
End: 2023-06-12
Payer: MEDICARE

## 2023-06-12 ENCOUNTER — HOSPITAL ENCOUNTER (OUTPATIENT)
Dept: GENERAL RADIOLOGY | Facility: HOSPITAL | Age: 73
Discharge: HOME | End: 2023-06-12
Payer: MEDICARE

## 2023-06-12 ENCOUNTER — ANESTHESIA EVENT (OUTPATIENT)
Dept: PERIOP | Facility: HOSPITAL | Age: 73
End: 2023-06-12
Payer: MEDICARE

## 2023-06-12 VITALS
SYSTOLIC BLOOD PRESSURE: 120 MMHG | DIASTOLIC BLOOD PRESSURE: 79 MMHG | WEIGHT: 199.8 LBS | OXYGEN SATURATION: 96 % | BODY MASS INDEX: 29.59 KG/M2 | HEIGHT: 69 IN | TEMPERATURE: 98.3 F | HEART RATE: 80 BPM | RESPIRATION RATE: 18 BRPM

## 2023-06-12 DIAGNOSIS — I71.21 ANEURYSM OF ASCENDING AORTA WITHOUT RUPTURE: ICD-10-CM

## 2023-06-12 DIAGNOSIS — I25.119 CORONARY ARTERY DISEASE INVOLVING NATIVE HEART WITH ANGINA PECTORIS, UNSPECIFIED VESSEL OR LESION TYPE: ICD-10-CM

## 2023-06-12 DIAGNOSIS — Z01.818 PREOPERATIVE TESTING: ICD-10-CM

## 2023-06-12 LAB
ABO GROUP BLD: NORMAL
ALBUMIN SERPL-MCNC: 4.2 G/DL (ref 3.5–5.2)
ALBUMIN/GLOB SERPL: 1.7 G/DL
ALP SERPL-CCNC: 96 U/L (ref 39–117)
ALT SERPL W P-5'-P-CCNC: 12 U/L (ref 1–41)
ANION GAP SERPL CALCULATED.3IONS-SCNC: 8 MMOL/L (ref 5–15)
APTT PPP: 25.5 SECONDS (ref 22.7–35.4)
ARTERIAL PATENCY WRIST A: POSITIVE
AST SERPL-CCNC: 16 U/L (ref 1–40)
ATMOSPHERIC PRESS: 746.2 MMHG
BASE EXCESS BLDA CALC-SCNC: 4.9 MMOL/L (ref 0–2)
BASOPHILS # BLD AUTO: 0.03 10*3/MM3 (ref 0–0.2)
BASOPHILS NFR BLD AUTO: 0.5 % (ref 0–1.5)
BDY SITE: ABNORMAL
BH CV XLRA MEAS - DIST GSV CALF DIST LEFT: 0.17 CM
BH CV XLRA MEAS - DIST GSV CALF DIST RIGHT: 0.18 CM
BH CV XLRA MEAS - DIST GSV THIGH DIST LEFT: 0.23 CM
BH CV XLRA MEAS - DIST GSV THIGH DIST RIGHT: 0.18 CM
BH CV XLRA MEAS - DIST LSV CALF DIST LEFT: 0.1 CM
BH CV XLRA MEAS - DIST LSV CALF DIST RIGHT: 0.09 CM
BH CV XLRA MEAS - GSV ANKLE DIST LEFT: 0.22 CM
BH CV XLRA MEAS - GSV ANKLE DIST RIGHT: 0.18 CM
BH CV XLRA MEAS - GSV KNEE DIST LEFT: 0.21 CM
BH CV XLRA MEAS - GSV KNEE DIST RIGHT: 0.2 CM
BH CV XLRA MEAS - GSV ORIGIN DIST LEFT: 0.54 CM
BH CV XLRA MEAS - GSV ORIGIN DIST RIGHT: 0.39 CM
BH CV XLRA MEAS - MID GSV CALF LEFT: 0.18 CM
BH CV XLRA MEAS - MID GSV CALF RIGHT: 0.16 CM
BH CV XLRA MEAS - MID GSV THIGH  LEFT: 0.21 CM
BH CV XLRA MEAS - MID GSV THIGH  RIGHT: 0.23 CM
BH CV XLRA MEAS - MID LSV CALF DIST LEFT: 0.13 CM
BH CV XLRA MEAS - MID LSV CALF DIST RIGHT: 0.23 CM
BH CV XLRA MEAS - PROX GSV CALF DIST LEFT: 0.24 CM
BH CV XLRA MEAS - PROX GSV CALF DIST RIGHT: 0.2 CM
BH CV XLRA MEAS - PROX GSV THIGH  LEFT: 0.29 CM
BH CV XLRA MEAS - PROX GSV THIGH  RIGHT: 0.26 CM
BH CV XLRA MEAS - PROX LSV CALF DIST LEFT: 0.13 CM
BH CV XLRA MEAS - PROX LSV CALF DIST RIGHT: 0.19 CM
BH CV XLRA MEAS LEFT DIST CCA EDV: -14.1 CM/SEC
BH CV XLRA MEAS LEFT DIST CCA PSV: -73.5 CM/SEC
BH CV XLRA MEAS LEFT DIST ICA EDV: -26.3 CM/SEC
BH CV XLRA MEAS LEFT DIST ICA PSV: -63.2 CM/SEC
BH CV XLRA MEAS LEFT ICA/CCA RATIO: 0.88
BH CV XLRA MEAS LEFT MID ICA EDV: 19.3 CM/SEC
BH CV XLRA MEAS LEFT MID ICA PSV: 45.9 CM/SEC
BH CV XLRA MEAS LEFT PROX CCA EDV: 17.6 CM/SEC
BH CV XLRA MEAS LEFT PROX CCA PSV: 90.3 CM/SEC
BH CV XLRA MEAS LEFT PROX ECA EDV: -11.1 CM/SEC
BH CV XLRA MEAS LEFT PROX ECA PSV: -98.5 CM/SEC
BH CV XLRA MEAS LEFT PROX ICA EDV: -11.8 CM/SEC
BH CV XLRA MEAS LEFT PROX ICA PSV: -64.4 CM/SEC
BH CV XLRA MEAS LEFT PROX SCLA PSV: 104 CM/SEC
BH CV XLRA MEAS LEFT VERTEBRAL A EDV: -14.3 CM/SEC
BH CV XLRA MEAS LEFT VERTEBRAL A PSV: -34.8 CM/SEC
BH CV XLRA MEAS RIGHT DIST CCA EDV: -25.1 CM/SEC
BH CV XLRA MEAS RIGHT DIST CCA PSV: -81.3 CM/SEC
BH CV XLRA MEAS RIGHT DIST ICA EDV: -25.1 CM/SEC
BH CV XLRA MEAS RIGHT DIST ICA PSV: -54.2 CM/SEC
BH CV XLRA MEAS RIGHT ICA/CCA RATIO: 0.84
BH CV XLRA MEAS RIGHT MID ICA EDV: -12.6 CM/SEC
BH CV XLRA MEAS RIGHT MID ICA PSV: -68.4 CM/SEC
BH CV XLRA MEAS RIGHT PROX CCA EDV: 18.2 CM/SEC
BH CV XLRA MEAS RIGHT PROX CCA PSV: 89.1 CM/SEC
BH CV XLRA MEAS RIGHT PROX ECA EDV: -12.2 CM/SEC
BH CV XLRA MEAS RIGHT PROX ECA PSV: -69.9 CM/SEC
BH CV XLRA MEAS RIGHT PROX ICA EDV: -20 CM/SEC
BH CV XLRA MEAS RIGHT PROX ICA PSV: -50.7 CM/SEC
BH CV XLRA MEAS RIGHT PROX SCLA PSV: 88.8 CM/SEC
BH CV XLRA MEAS RIGHT VERTEBRAL A EDV: -15 CM/SEC
BH CV XLRA MEAS RIGHT VERTEBRAL A PSV: -36.7 CM/SEC
BILIRUB SERPL-MCNC: 0.4 MG/DL (ref 0–1.2)
BILIRUB UR QL STRIP: NEGATIVE
BLD GP AB SCN SERPL QL: NEGATIVE
BUN SERPL-MCNC: 16 MG/DL (ref 8–23)
BUN/CREAT SERPL: 17.2 (ref 7–25)
CALCIUM SPEC-SCNC: 9.1 MG/DL (ref 8.6–10.5)
CHLORIDE SERPL-SCNC: 102 MMOL/L (ref 98–107)
CHOLEST SERPL-MCNC: 141 MG/DL (ref 0–200)
CLARITY UR: CLEAR
CLOSE TME COLL+ADP + EPINEP PNL BLD: 97 % (ref 86–100)
CO2 SERPL-SCNC: 32 MMOL/L (ref 22–29)
COLOR UR: YELLOW
CREAT SERPL-MCNC: 0.93 MG/DL (ref 0.76–1.27)
DEPRECATED RDW RBC AUTO: 42.8 FL (ref 37–54)
EGFRCR SERPLBLD CKD-EPI 2021: 86.2 ML/MIN/1.73
EOSINOPHIL # BLD AUTO: 0.19 10*3/MM3 (ref 0–0.4)
EOSINOPHIL NFR BLD AUTO: 3.2 % (ref 0.3–6.2)
ERYTHROCYTE [DISTWIDTH] IN BLOOD BY AUTOMATED COUNT: 13.7 % (ref 12.3–15.4)
GLOBULIN UR ELPH-MCNC: 2.5 GM/DL
GLUCOSE SERPL-MCNC: 199 MG/DL (ref 65–99)
GLUCOSE UR STRIP-MCNC: ABNORMAL MG/DL
HBA1C MFR BLD: 6.8 % (ref 4.8–5.6)
HCO3 BLDA-SCNC: 30.8 MMOL/L (ref 22–28)
HCT VFR BLD AUTO: 43 % (ref 37.5–51)
HDLC SERPL-MCNC: 50 MG/DL (ref 40–60)
HGB BLD-MCNC: 14.5 G/DL (ref 13–17.7)
HGB UR QL STRIP.AUTO: NEGATIVE
IMM GRANULOCYTES # BLD AUTO: 0.06 10*3/MM3 (ref 0–0.05)
IMM GRANULOCYTES NFR BLD AUTO: 1 % (ref 0–0.5)
INR PPP: 0.84 (ref 0.9–1.1)
KETONES UR QL STRIP: NEGATIVE
LDLC SERPL CALC-MCNC: 69 MG/DL (ref 0–100)
LDLC/HDLC SERPL: 1.34 {RATIO}
LEUKOCYTE ESTERASE UR QL STRIP.AUTO: NEGATIVE
LYMPHOCYTES # BLD AUTO: 1.06 10*3/MM3 (ref 0.7–3.1)
LYMPHOCYTES NFR BLD AUTO: 17.7 % (ref 19.6–45.3)
MAGNESIUM SERPL-MCNC: 1.9 MG/DL (ref 1.6–2.4)
MCH RBC QN AUTO: 29.1 PG (ref 26.6–33)
MCHC RBC AUTO-ENTMCNC: 33.7 G/DL (ref 31.5–35.7)
MCV RBC AUTO: 86.3 FL (ref 79–97)
MODALITY: ABNORMAL
MONOCYTES # BLD AUTO: 0.57 10*3/MM3 (ref 0.1–0.9)
MONOCYTES NFR BLD AUTO: 9.5 % (ref 5–12)
NEUTROPHILS NFR BLD AUTO: 4.08 10*3/MM3 (ref 1.7–7)
NEUTROPHILS NFR BLD AUTO: 68.1 % (ref 42.7–76)
NITRITE UR QL STRIP: NEGATIVE
NRBC BLD AUTO-RTO: 0 /100 WBC (ref 0–0.2)
NT-PROBNP SERPL-MCNC: 279 PG/ML (ref 0–900)
PCO2 BLDA: 49.4 MM HG (ref 35–45)
PH BLDA: 7.4 PH UNITS (ref 7.35–7.45)
PH UR STRIP.AUTO: <=5 [PH] (ref 5–8)
PLATELET # BLD AUTO: 205 10*3/MM3 (ref 140–450)
PMV BLD AUTO: 9.2 FL (ref 6–12)
PO2 BLDA: 75.9 MM HG (ref 80–100)
POTASSIUM SERPL-SCNC: 3.9 MMOL/L (ref 3.5–5.2)
PROT SERPL-MCNC: 6.7 G/DL (ref 6–8.5)
PROT UR QL STRIP: NEGATIVE
PROTHROMBIN TIME: 11.6 SECONDS (ref 11.7–14.2)
QT INTERVAL: 379 MS
RBC # BLD AUTO: 4.98 10*6/MM3 (ref 4.14–5.8)
RH BLD: POSITIVE
SAO2 % BLDCOA: 94.8 % (ref 92–99)
SARS-COV-2 ORF1AB RESP QL NAA+PROBE: NOT DETECTED
SODIUM SERPL-SCNC: 142 MMOL/L (ref 136–145)
SP GR UR STRIP: >=1.03 (ref 1–1.03)
T&S EXPIRATION DATE: NORMAL
TOTAL RATE: 16 BREATHS/MINUTE
TRIGL SERPL-MCNC: 121 MG/DL (ref 0–150)
UROBILINOGEN UR QL STRIP: ABNORMAL
VLDLC SERPL-MCNC: 22 MG/DL (ref 5–40)
WBC NRBC COR # BLD: 5.99 10*3/MM3 (ref 3.4–10.8)

## 2023-06-12 PROCEDURE — 36600 WITHDRAWAL OF ARTERIAL BLOOD: CPT | Performed by: INTERNAL MEDICINE

## 2023-06-12 PROCEDURE — 85025 COMPLETE CBC W/AUTO DIFF WBC: CPT

## 2023-06-12 PROCEDURE — 36415 COLL VENOUS BLD VENIPUNCTURE: CPT

## 2023-06-12 PROCEDURE — 93970 EXTREMITY STUDY: CPT

## 2023-06-12 PROCEDURE — 86850 RBC ANTIBODY SCREEN: CPT

## 2023-06-12 PROCEDURE — 86901 BLOOD TYPING SEROLOGIC RH(D): CPT

## 2023-06-12 PROCEDURE — 83880 ASSAY OF NATRIURETIC PEPTIDE: CPT

## 2023-06-12 PROCEDURE — 80061 LIPID PANEL: CPT

## 2023-06-12 PROCEDURE — 86900 BLOOD TYPING SEROLOGIC ABO: CPT

## 2023-06-12 PROCEDURE — 85610 PROTHROMBIN TIME: CPT

## 2023-06-12 PROCEDURE — 71046 X-RAY EXAM CHEST 2 VIEWS: CPT

## 2023-06-12 PROCEDURE — 81003 URINALYSIS AUTO W/O SCOPE: CPT

## 2023-06-12 PROCEDURE — 93005 ELECTROCARDIOGRAM TRACING: CPT

## 2023-06-12 PROCEDURE — 83036 HEMOGLOBIN GLYCOSYLATED A1C: CPT

## 2023-06-12 PROCEDURE — 93010 ELECTROCARDIOGRAM REPORT: CPT | Performed by: INTERNAL MEDICINE

## 2023-06-12 PROCEDURE — 85730 THROMBOPLASTIN TIME PARTIAL: CPT

## 2023-06-12 PROCEDURE — 80053 COMPREHEN METABOLIC PANEL: CPT

## 2023-06-12 PROCEDURE — 82803 BLOOD GASES ANY COMBINATION: CPT | Performed by: INTERNAL MEDICINE

## 2023-06-12 PROCEDURE — 85576 BLOOD PLATELET AGGREGATION: CPT

## 2023-06-12 PROCEDURE — 87635 SARS-COV-2 COVID-19 AMP PRB: CPT

## 2023-06-12 PROCEDURE — 93880 EXTRACRANIAL BILAT STUDY: CPT

## 2023-06-12 PROCEDURE — 83735 ASSAY OF MAGNESIUM: CPT

## 2023-06-12 RX ORDER — CHLORHEXIDINE GLUCONATE 0.12 MG/ML
15 RINSE ORAL TAKE AS DIRECTED
Status: ON HOLD | COMMUNITY

## 2023-06-12 RX ORDER — CHLORHEXIDINE GLUCONATE 500 MG/1
1 CLOTH TOPICAL EVERY 12 HOURS PRN
Status: ACTIVE | OUTPATIENT
Start: 2023-06-12

## 2023-06-12 RX ORDER — CHLORHEXIDINE GLUCONATE 0.12 MG/ML
15 RINSE ORAL EVERY 12 HOURS
Status: DISPENSED | OUTPATIENT
Start: 2023-06-12 | End: 2023-06-13

## 2023-06-12 NOTE — ANESTHESIA PREPROCEDURE EVALUATION
Anesthesia Evaluation     Patient summary reviewed and Nursing notes reviewed   NPO Solid Status: > 8 hours  NPO Liquid Status: > 2 hours           Airway   Mallampati: II  TM distance: >3 FB  Neck ROM: full  No difficulty expected  Dental - normal exam     Pulmonary - normal exam   (+) shortness of breath,   Cardiovascular   Exercise tolerance: poor (<4 METS)    ECG reviewed    (+) pacemaker pacemaker interrogated 6-12 months ago, hypertension, valvular problems/murmurs AS, dysrhythmias Bradycardia, murmur, hyperlipidemia,       Neuro/Psych- negative ROS  GI/Hepatic/Renal/Endo    (+)  GERD,  diabetes mellitus type 2,     Musculoskeletal     Abdominal  - normal exam    Bowel sounds: normal.   Substance History - negative use     OB/GYN negative ob/gyn ROS         Other   arthritis,                      Anesthesia Plan    ASA 4     general, [place], CVL and PAC     intravenous induction   Postoperative Plan: Expected vent after surgery  Anesthetic plan, risks, benefits, and alternatives have been provided, discussed and informed consent has been obtained with: patient.  Pre-procedure education provided  Use of blood products discussed with patient  Consented to blood products.       CODE STATUS:

## 2023-06-12 NOTE — DISCHARGE INSTRUCTIONS
Take the following medications the morning of surgery with a small sip of water:  NONE    Arrive to hospital on your day of surgery at 5:00 AM.      If you are on prescription narcotic pain medication to control your pain you may also take that medication the morning of surgery.    General Instructions:  Do not eat or drink anything after midnight the night before surgery.  Infants may have breast milk up to four hours before surgery.  Infants drinking formula may drink formula up to six hours before surgery.   Patients who avoid smoking, chewing tobacco and alcohol for 4 weeks prior to surgery have a reduced risk of post-operative complications.  Quit smoking as many days before surgery as you can.  Do not smoke, use chewing tobacco or drink alcohol the day of surgery.   If applicable bring your C-PAP/ BI-PAP machine in with you to preop day of surgery.  Bring any papers given to you in the doctor’s office.  Wear clean comfortable clothes.  Do not wear contact lenses, false eyelashes or make-up.  Bring a case for your glasses.   Bring crutches or walker if applicable.  Remove all piercings.  Leave jewelry and any other valuables at home.  Hair extensions with metal clips must be removed prior to surgery.  The Pre-Admission Testing nurse will instruct you to bring medications if unable to obtain an accurate list in Pre-Admission Testing.        If you were given a blood bank ID arm band remember to bring it with you the day of surgery.    Preventing a Surgical Site Infection:  For 2 to 3 days before surgery, avoid shaving with a razor because the razor can irritate skin and make it easier to develop an infection.    Any areas of open skin can increase the risk of a post-operative wound infection by allowing bacteria to enter and travel throughout the body.  Notify your surgeon if you have any skin wounds / rashes even if it is not near the expected surgical site.  The area will need assessed to determine if surgery  should be delayed until it is healed.  The night prior to surgery shower using a fresh bar of anti-bacterial soap (such as Dial) and clean washcloth.  Sleep in a clean bed with clean clothing.  Do not allow pets to sleep with you.  Shower on the morning of surgery using a fresh bar of anti-bacterial soap (such as Dial) and clean washcloth.  Dry with a clean towel and dress in clean clothing.  Ask your surgeon if you will be receiving antibiotics prior to surgery.  Make sure you, your family, and all healthcare providers clean their hands with soap and water or an alcohol based hand  before caring for you or your wound.    Day of surgery:  Your arrival time is approximately two hours before your scheduled surgery time.  Upon arrival, a Pre-op nurse and Anesthesiologist will review your health history, obtain vital signs, and answer questions you may have.  The only belongings needed at this time will be your home medications and if applicable your C-PAP/BI-PAP machine.  A Pre-op nurse will start an IV and you may receive medication in preparation for surgery, including something to help you relax.      Please be aware that surgery does come with discomfort.  We want to make every effort to control your discomfort so please discuss any uncontrolled symptoms with your nurse.   Your doctor will most likely have prescribed pain medications.      If you are going home after surgery you will receive individualized written care instructions before being discharged.  A responsible adult must drive you to and from the hospital on the day of your surgery and stay with you for 24 hours.  Discharge prescriptions can be filled by the hospital pharmacy during regular pharmacy hours.  If you are having surgery late in the day/evening your prescription may be e-prescribed to your pharmacy.  Please verify your pharmacy hours or chose a 24 hour pharmacy to avoid not having access to your prescription because your pharmacy has  closed for the day.    If you are staying overnight following surgery, you will be transported to your hospital room following the recovery period.  [facility] has all private rooms.    If you have any questions please call Pre-Admission Testing at [phone].  Deductibles and co-payments are collected on the day of service. Please be prepared to pay the required co-pay, deductible or deposit on the day of service as defined by your plan.    Call your surgeon immediately if you experience any of the following symptoms:  Sore Throat  Shortness of Breath or difficulty breathing  Cough  Chills  Body soreness or muscle pain  Headache  Fever  New loss of taste or smell  Do not arrive for your surgery ill.  Your procedure will need to be rescheduled to another time.  You will need to call your physician before the day of surgery to avoid any unnecessary exposure to hospital staff as well as other patients.    CHLORHEXIDINE CLOTH INSTRUCTIONS  The morning of surgery follow these instructions using the Chlorhexidine cloths you've been given.  These steps reduce bacteria on the body.  Do not use the cloths near your eyes, ears mouth, genitalia or on open wounds.  Throw the cloths away after use but do not try to flush them down a toilet.      Open and remove one cloth at a time from the package.    Leave the cloth unfolded and begin the bathing.  Massage the skin with the cloths using gentle pressure to remove bacteria.  Do not scrub harshly.   Follow the steps below with one 2% CHG cloth per area (6 total cloths).  One cloth for neck, shoulders and chest.  One cloth for both arms, hands, fingers and underarms (do underarms last).  One cloth for the abdomen followed by groin.  One cloth for right leg and foot including between the toes.  One cloth for left leg and foot including between the toes.  The last cloth is to be used for the back of the neck, back and buttocks.    Allow the CHG to air dry 3 minutes  on the skin which will give it time to work and decrease the chance of irritation.  The skin may feel sticky until it is dry.  Do not rinse with water or any other liquid or you will lose the beneficial effects of the CHG.  If mild skin irritation occurs, do rinse the skin to remove the CHG.  Report this to the nurse at time of admission.  Do not apply lotions, creams, ointments, deodorants or perfumes after using the clothes. Dress in clean clothes before coming to the hospital.    BACTROBAN NASAL OINTMENT  There are many germs normally in your nose. Bactroban is an ointment that will help reduce these germs. Please follow these instructions for Bactroban use:    ____The day before surgery in the evening              Date________    ____The day of surgery in the morning    Date________    **Squirt ½ package of Bactroban Ointment onto a cotton applicator and apply to inside of 1st nostril.  Squirt the remaining Bactroban and apply to the inside of the other nostril.    PERIDEX- ORAL:  Use only if your surgeon has ordered  Use the night before and morning of surgery - Swish, gargle, and spit - do not swallow.

## 2023-06-14 ENCOUNTER — ANESTHESIA (OUTPATIENT)
Dept: PERIOP | Facility: HOSPITAL | Age: 73
End: 2023-06-14
Payer: MEDICARE

## 2023-06-14 PROCEDURE — 63710000001 INSULIN REGULAR HUMAN PER 5 UNITS: Performed by: ANESTHESIOLOGY

## 2023-06-14 PROCEDURE — 25010000002 MIDAZOLAM PER 1 MG: Performed by: ANESTHESIOLOGY

## 2023-06-14 PROCEDURE — 25010000002 HYDROCORTISONE SOD SUC (PF) 100 MG RECONSTITUTED SOLUTION: Performed by: ANESTHESIOLOGY

## 2023-06-14 PROCEDURE — 25010000002 MAGNESIUM SULFATE PER 500 MG OF MAGNESIUM: Performed by: ANESTHESIOLOGY

## 2023-06-14 PROCEDURE — 25010000002 PROPOFOL 10 MG/ML EMULSION: Performed by: ANESTHESIOLOGY

## 2023-06-14 PROCEDURE — 25010000002 PROTAMINE SULFATE PER 10 MG: Performed by: ANESTHESIOLOGY

## 2023-06-14 PROCEDURE — 25010000002 HEPARIN (PORCINE) PER 1000 UNITS: Performed by: ANESTHESIOLOGY

## 2023-06-14 PROCEDURE — C1751 CATH, INF, PER/CENT/MIDLINE: HCPCS | Performed by: ANESTHESIOLOGY

## 2023-06-14 PROCEDURE — 25010000002 DESMOPRESSIN PER 1 MCG: Performed by: THORACIC SURGERY (CARDIOTHORACIC VASCULAR SURGERY)

## 2023-06-14 PROCEDURE — 25010000002 CEFAZOLIN IN DEXTROSE 2-4 GM/100ML-% SOLUTION: Performed by: NURSE PRACTITIONER

## 2023-06-14 PROCEDURE — 25010000002 PHENYLEPHRINE 10 MG/ML SOLUTION 5 ML VIAL: Performed by: ANESTHESIOLOGY

## 2023-06-14 PROCEDURE — 25010000002 FENTANYL CITRATE (PF) 250 MCG/5ML SOLUTION: Performed by: ANESTHESIOLOGY

## 2023-06-14 RX ORDER — MIDAZOLAM HYDROCHLORIDE 1 MG/ML
INJECTION INTRAMUSCULAR; INTRAVENOUS AS NEEDED
Status: DISCONTINUED | OUTPATIENT
Start: 2023-06-14 | End: 2023-06-14 | Stop reason: SURG

## 2023-06-14 RX ORDER — PROPOFOL 10 MG/ML
VIAL (ML) INTRAVENOUS AS NEEDED
Status: DISCONTINUED | OUTPATIENT
Start: 2023-06-14 | End: 2023-06-14 | Stop reason: SURG

## 2023-06-14 RX ORDER — PHENYLEPHRINE HCL IN 0.9% NACL 1 MG/10 ML
SYRINGE (ML) INTRAVENOUS AS NEEDED
Status: DISCONTINUED | OUTPATIENT
Start: 2023-06-14 | End: 2023-06-14 | Stop reason: SURG

## 2023-06-14 RX ORDER — FENTANYL CITRATE 50 UG/ML
INJECTION, SOLUTION INTRAMUSCULAR; INTRAVENOUS AS NEEDED
Status: DISCONTINUED | OUTPATIENT
Start: 2023-06-14 | End: 2023-06-14 | Stop reason: SURG

## 2023-06-14 RX ORDER — ROCURONIUM BROMIDE 10 MG/ML
INJECTION, SOLUTION INTRAVENOUS AS NEEDED
Status: DISCONTINUED | OUTPATIENT
Start: 2023-06-14 | End: 2023-06-14 | Stop reason: SURG

## 2023-06-14 RX ORDER — CALCIUM CHLORIDE 100 MG/ML
INJECTION INTRAVENOUS; INTRAVENTRICULAR AS NEEDED
Status: DISCONTINUED | OUTPATIENT
Start: 2023-06-14 | End: 2023-06-14 | Stop reason: SURG

## 2023-06-14 RX ORDER — HEPARIN SODIUM 1000 [USP'U]/ML
INJECTION, SOLUTION INTRAVENOUS; SUBCUTANEOUS AS NEEDED
Status: DISCONTINUED | OUTPATIENT
Start: 2023-06-14 | End: 2023-06-14 | Stop reason: SURG

## 2023-06-14 RX ORDER — MAGNESIUM SULFATE HEPTAHYDRATE 500 MG/ML
INJECTION, SOLUTION INTRAMUSCULAR; INTRAVENOUS AS NEEDED
Status: DISCONTINUED | OUTPATIENT
Start: 2023-06-14 | End: 2023-06-14 | Stop reason: SURG

## 2023-06-14 RX ORDER — PROTAMINE SULFATE 10 MG/ML
INJECTION, SOLUTION INTRAVENOUS AS NEEDED
Status: DISCONTINUED | OUTPATIENT
Start: 2023-06-14 | End: 2023-06-14 | Stop reason: SURG

## 2023-06-14 RX ORDER — AMINOCAPROIC ACID 250 MG/ML
INJECTION, SOLUTION INTRAVENOUS AS NEEDED
Status: DISCONTINUED | OUTPATIENT
Start: 2023-06-14 | End: 2023-06-14 | Stop reason: SURG

## 2023-06-14 RX ORDER — SODIUM CHLORIDE 9 MG/ML
INJECTION, SOLUTION INTRAVENOUS CONTINUOUS PRN
Status: DISCONTINUED | OUTPATIENT
Start: 2023-06-14 | End: 2023-06-14 | Stop reason: SURG

## 2023-06-14 RX ORDER — NICARDIPINE HYDROCHLORIDE 2.5 MG/ML
INJECTION INTRAVENOUS AS NEEDED
Status: DISCONTINUED | OUTPATIENT
Start: 2023-06-14 | End: 2023-06-14 | Stop reason: SURG

## 2023-06-14 RX ORDER — LIDOCAINE HYDROCHLORIDE 20 MG/ML
INJECTION, SOLUTION EPIDURAL; INFILTRATION; INTRACAUDAL; PERINEURAL AS NEEDED
Status: DISCONTINUED | OUTPATIENT
Start: 2023-06-14 | End: 2023-06-14 | Stop reason: SURG

## 2023-06-14 RX ADMIN — FENTANYL CITRATE 150 MCG: 50 INJECTION, SOLUTION INTRAMUSCULAR; INTRAVENOUS at 11:28

## 2023-06-14 RX ADMIN — NICARDIPINE HYDROCHLORIDE 0.4 MG: 2.5 INJECTION, SOLUTION INTRAVENOUS at 08:03

## 2023-06-14 RX ADMIN — HYDROCORTISONE SODIUM SUCCINATE 100 MG: 100 INJECTION, POWDER, FOR SOLUTION INTRAMUSCULAR; INTRAVENOUS at 08:26

## 2023-06-14 RX ADMIN — NICARDIPINE HYDROCHLORIDE 0.6 MG: 2.5 INJECTION, SOLUTION INTRAVENOUS at 11:28

## 2023-06-14 RX ADMIN — NICARDIPINE HYDROCHLORIDE 5 MG/HR: 2.5 INJECTION, SOLUTION INTRAVENOUS at 08:03

## 2023-06-14 RX ADMIN — SODIUM CHLORIDE 1.9 UNITS/HR: 900 INJECTION, SOLUTION INTRAVENOUS at 08:38

## 2023-06-14 RX ADMIN — FENTANYL CITRATE 150 MCG: 50 INJECTION, SOLUTION INTRAMUSCULAR; INTRAVENOUS at 08:03

## 2023-06-14 RX ADMIN — CEFAZOLIN SODIUM 2 G: 2 INJECTION, SOLUTION INTRAVENOUS at 07:36

## 2023-06-14 RX ADMIN — HEPARIN SODIUM 28000 UNITS: 1000 INJECTION, SOLUTION INTRAVENOUS; SUBCUTANEOUS at 08:50

## 2023-06-14 RX ADMIN — DEXMEDETOMIDINE 0.5 MCG/KG/HR: 100 INJECTION, SOLUTION, CONCENTRATE INTRAVENOUS at 07:36

## 2023-06-14 RX ADMIN — PROPOFOL 60 MG: 10 INJECTION, EMULSION INTRAVENOUS at 07:08

## 2023-06-14 RX ADMIN — CEFAZOLIN SODIUM 2 G: 2 INJECTION, SOLUTION INTRAVENOUS at 11:29

## 2023-06-14 RX ADMIN — LIDOCAINE HYDROCHLORIDE 100 MG: 20 INJECTION, SOLUTION EPIDURAL; INFILTRATION; INTRACAUDAL; PERINEURAL at 07:08

## 2023-06-14 RX ADMIN — ROCURONIUM BROMIDE 50 MG: 10 INJECTION, SOLUTION INTRAVENOUS at 10:25

## 2023-06-14 RX ADMIN — DESMOPRESSIN ACETATE 27.24 MCG: 4 SOLUTION INTRAVENOUS at 11:47

## 2023-06-14 RX ADMIN — ROCURONIUM BROMIDE 20 MG: 10 INJECTION, SOLUTION INTRAVENOUS at 08:54

## 2023-06-14 RX ADMIN — PROPOFOL 50 MG: 10 INJECTION, EMULSION INTRAVENOUS at 08:03

## 2023-06-14 RX ADMIN — AMINOCAPROIC ACID 10 G: 250 INJECTION, SOLUTION INTRAVENOUS at 11:24

## 2023-06-14 RX ADMIN — PHENYLEPHRINE HYDROCHLORIDE 0.5 MCG/KG/MIN: 10 INJECTION INTRAVENOUS at 07:15

## 2023-06-14 RX ADMIN — MIDAZOLAM 2 MG: 1 INJECTION INTRAMUSCULAR; INTRAVENOUS at 07:01

## 2023-06-14 RX ADMIN — NICARDIPINE HYDROCHLORIDE 0.6 MG: 2.5 INJECTION, SOLUTION INTRAVENOUS at 08:05

## 2023-06-14 RX ADMIN — Medication 100 MCG: at 07:10

## 2023-06-14 RX ADMIN — CALCIUM CHLORIDE 250 MG: 100 INJECTION INTRAVENOUS; INTRAVENTRICULAR at 13:28

## 2023-06-14 RX ADMIN — PROTAMINE SULFATE 300 MG: 10 INJECTION, SOLUTION INTRAVENOUS at 11:23

## 2023-06-14 RX ADMIN — ROCURONIUM BROMIDE 80 MG: 10 INJECTION, SOLUTION INTRAVENOUS at 07:08

## 2023-06-14 RX ADMIN — MAGNESIUM SULFATE HEPTAHYDRATE 2 G: 500 INJECTION, SOLUTION INTRAMUSCULAR; INTRAVENOUS at 11:06

## 2023-06-14 RX ADMIN — CALCIUM CHLORIDE 250 MG: 100 INJECTION INTRAVENOUS; INTRAVENTRICULAR at 12:53

## 2023-06-14 RX ADMIN — AMINOCAPROIC ACID 10 G: 250 INJECTION, SOLUTION INTRAVENOUS at 08:52

## 2023-06-14 RX ADMIN — NICARDIPINE HYDROCHLORIDE 0.6 MG: 2.5 INJECTION, SOLUTION INTRAVENOUS at 08:09

## 2023-06-14 RX ADMIN — FENTANYL CITRATE 200 MCG: 50 INJECTION, SOLUTION INTRAMUSCULAR; INTRAVENOUS at 07:08

## 2023-06-14 RX ADMIN — SODIUM CHLORIDE: 9 INJECTION, SOLUTION INTRAVENOUS at 06:57

## 2023-06-14 RX ADMIN — PROPOFOL 25 MCG/KG/MIN: 10 INJECTION, EMULSION INTRAVENOUS at 07:36

## 2023-06-14 RX ADMIN — NICARDIPINE HYDROCHLORIDE 0.6 MG: 2.5 INJECTION, SOLUTION INTRAVENOUS at 08:07

## 2023-06-14 NOTE — ANESTHESIA PROCEDURE NOTES
Central Line      Patient reassessed immediately prior to procedure    Patient location during procedure: OR  Start time: 6/14/2023 7:17 AM  Stop Time:6/14/2023 7:05 AM  Indications: MD/Surgeon request, vascular access and central pressure monitoring  Staff  Anesthesiologist: Lina Aguilar MD  Preanesthetic Checklist  Completed: patient identified, IV checked, site marked, risks and benefits discussed, surgical consent, monitors and equipment checked, pre-op evaluation and timeout performed  Central Line Prep  Sterile Tech:cap, gloves, gown, mask and sterile barriers  Prep: chloraprep  Patient monitoring: blood pressure monitoring, EKG and continuous pulse oximetry  Central Line Procedure  Laterality:right  Location:internal jugular  Catheter Type:Cordis (MAC Introducer)  Catheter Size:9 Fr  Guidance:ultrasound guided  PROCEDURE NOTE/ULTRASOUND INTERPRETATION.  Using ultrasound guidance the potential vascular sites for insertion of the catheter were visualized to determine the patency of the vessel to be used for vascular access.  After selecting the appropriate site for insertion, the needle was visualized under ultrasound being inserted into the internal jugular vein, followed by ultrasound confirmation of wire and catheter placement. There were no abnormalities seen on ultrasound; an image was taken; and the patient tolerated the procedure with no complications. Images: still images obtained, printed/placed on chart (Image in SHALONDA exam)  Assessment  Post procedure:biopatch applied, line sutured, occlusive dressing applied and secured with tape  Assessement:blood return through all ports, free fluid flow, chest x-ray ordered and Mason Test  Complications:no  Patient Tolerance:patient tolerated the procedure well with no apparent complications

## 2023-06-14 NOTE — ANESTHESIA PROCEDURE NOTES
Airway  Urgency: elective    Date/Time: 6/14/2023 7:12 AM  Airway not difficult    General Information and Staff    Patient location during procedure: OR  Anesthesiologist: Lina Aguilar MD    Indications and Patient Condition  Indications for airway management: airway protection    Preoxygenated: yes  MILS maintained throughout  Mask difficulty assessment: 2 - vent by mask + OA or adjuvant +/- NMBA    Final Airway Details  Final airway type: endotracheal airway      Successful airway: ETT  Cuffed: yes   Successful intubation technique: direct laryngoscopy  Endotracheal tube insertion site: oral  Blade: Mila  Blade size: 3  ETT size (mm): 8.0  Cormack-Lehane Classification: grade I - full view of glottis  Placement verified by: chest auscultation and capnometry   Measured from: teeth  Number of attempts at approach: 1  Assessment: lips, teeth, and gum same as pre-op and atraumatic intubation

## 2023-06-14 NOTE — ANESTHESIA POSTPROCEDURE EVALUATION
"Patient: Alfredo Toure    Procedure Summary     Date: 06/14/23 Room / Location: [place] [address] / [facility] CARDIOVASCULAR OPERATING ROOM    Anesthesia Start: 0657 Anesthesia Stop: 1353    Procedures:       STERNOTOMY;  AORTIC ANEURYSM REPAIR AORTIC VALVE REPLACEMENT; AMPUTATION OF LEFT ATRIAL APPENDAGE;  TRANSESOPHAGEAL ECHOCARDIOGRAM WITH ANESTHESIA; PRP (Chest)      STERNOTOMY; CORONARY ARTERY BYPASS X 3 WITH INTERNAL MAMMARY ARTERY GRAFT AND ENDOSCOPIC SAPHENOUS VEIN HARVEST FROM RIGHT LEG (Chest) Diagnosis:       Aneurysm of ascending aorta without rupture      (Aneurysm of ascending aorta without rupture [I71.21])    Surgeons: Ky Arce MD Provider: Lina Aguilar MD    Anesthesia Type: general, [place], CVL, PAC ASA Status: 4          Anesthesia Type: general, [place], CVL, PAC    Vitals  Vitals Value Taken Time   /64 06/14/23 1706   Temp 36.5 °C (97.7 °F) 06/14/23 1719   Pulse 80 06/14/23 1719   Resp 18 06/14/23 1349   SpO2 100 % 06/14/23 1719   Vitals shown include unvalidated device data.        Post Anesthesia Care and Evaluation    Patient location during evaluation: bedside  Patient participation: complete - patient participated  Level of consciousness: awake  Pain score: 3  Pain management: adequate    Airway patency: patent  Anesthetic complications: No anesthetic complications    Cardiovascular status: acceptable  Respiratory status: nasal cannula  Hydration status: acceptable    Comments: /67 (BP Location: Left arm, Patient Position: Lying)   Pulse 71   Temp 36.9 °C (98.5 °F) (Oral)   Resp 18   Ht 175.3 cm (69\")   Wt 90.8 kg (200 lb 2.8 oz)   SpO2 99%   BMI 29.56 kg/m²       "

## 2023-06-14 NOTE — ANESTHESIA PROCEDURE NOTES
Arterial Line      Patient reassessed immediately prior to procedure     Line placed for hemodynamic monitoring and ABGs/Labs/ISTAT.  Performed By   Anesthesiologist: Lina Aguilar MD   Preanesthetic Checklist  Completed: patient identified, IV checked, site marked, risks and benefits discussed, surgical consent, monitors and equipment checked, pre-op evaluation and timeout performed  Arterial Line Prep    Sterile Tech: gloves, mask and cap  Prep: ChloraPrep  Patient monitoring: blood pressure monitoring, continuous pulse oximetry and EKG  Arterial Line Procedure   Laterality:left  Location:  radial artery  Catheter size: 20 G   Guidance: landmark technique  Number of attempts: 1  Successful placement: yes   Post Assessment   Dressing Type: secured with tape and occlusive dressing applied.   Complications no  Circ/Move/Sens Assessment: normal and unchanged.   Patient Tolerance: patient tolerated the procedure well with no apparent complications

## 2023-06-14 NOTE — ANESTHESIA PROCEDURE NOTES
Diagnostic IntraOp Per    Procedure Performed: Diagnostic IntraOp Per       Start Time:  6/14/2023 9:29 AM       End Time:   6/14/2023 9:29 AM    Preanesthesia Checklist:  Patient identified, IV assessed, risks and benefits discussed, monitors and equipment assessed, procedure being performed at surgeon's request and anesthesia consent obtained.    General Procedure Information  Diagnostic Indications for Echo:  assessment of ascending aorta, assessment of surgical repair, defect repair evaluation and hemodynamic monitoring  Physician Requesting Echo: Ky Arce MD  ICD Code for Medical Necessity:  Non Rheumatic MR  Location performed:  OR  Intubated  Bite block placed  Heart visualized  Probe Insertion:  Easy  Probe Type:  Multiplane  Modalities:  2D only, color flow mapping, continuous wave Doppler and pulse wave Doppler    Echocardiographic and Doppler Measurements    Ventricles    Right Ventricle:  Cavity size normal.  Hypertrophy not present.  Thrombus not present.  Global function normal.    Left Ventricle:  Cavity size dilated.  Diastolic dimension 5.3 cm.  Thrombus not present.  Global Function normal.  Ejection Fraction 60%.          Valves    Aortic Valve:  Annulus calcified.  Stenosis severe.  Area: 1.5 cm².  Mean Gradient: 39/60 mmHg.  Regurgitation trace.  Leaflets calcified, bicuspid and thickened.  Leaflet motions restricted.      Mitral Valve:  Annulus calcified.  Stenosis not present.  Regurgitation trace.  Leaflets normal.  Leaflet motions normal.      Tricuspid Valve:  Annulus normal.  Stenosis not present.  Regurgitation trace.    Pulmonic Valve:  Regurgitation mild.        Aorta    Ascending Aorta:  Size aneurysmal.  Diameter 4.8 cm.  Dissection not present.  Plaque thickness less than 3 mm.  Mobile plaque not present.    Aortic Arch:  Size normal.  Diameter 3.6 cm.  Dissection not present.  Plaque thickness less than 3 mm.  Mobile plaque not present.    Descending Aorta:  Size normal.   Dissection not present.  Plaque thickness less than 3 mm.  Mobile plaque not present.        Atria    Right Atrium:  Size normal.  Spontaneous echo contrast not present.  Thrombus not present.  Tumor not present.  Device present.    Left Atrium:  Size dilated.  Spontaneous echo contrast not present.  Thrombus not present.  Tumor not present.  Device not present.    Left atrial appendage normal.  Other Atria Findings:       NATIVIDAD bilobed    Septa        Ventricular Septum:  Intra-ventricular septum morphology normal.          Other Findings  Pericardium:  normal  Pleural Effusion:  none  Pulmonary Arteries:  normal  Pulmonary Venous Flow:  normal    Anesthesia Information  Performed Personally  Anesthesiologist:  Lina Aguilar MD      Echocardiogram Comments:       Post CPB   S/p Ascending aorta replacement, AVR, NATIVIDAD ligation, CABG x3  Bioprosthetic aortic valve well seated, no paravalvular leak, trace intravalvular leak, mean gradient 5mmHg  Other valves unchanged  LVEF 65%, underfilled  No distal aortic dissection post separation from CPB

## 2023-06-14 NOTE — ANESTHESIA PROCEDURE NOTES
PA Catheter      Patient reassessed immediately prior to procedure    Patient location during procedure: OR  Start time: 6/14/2023 7:22 AM  Stop Time:6/14/2023 7:25 AM  Indications: central pressure monitoring  Staff  Anesthesiologist: iLna Aguilar MD  Preanesthetic Checklist  Completed: patient identified, IV checked, site marked, risks and benefits discussed, surgical consent, monitors and equipment checked, pre-op evaluation and timeout performed  Central Line Prep  Sterile Tech:cap, gloves, gown, mask and sterile barriers  Prep: chloraprep  Patient monitoring: blood pressure monitoring, EKG and continuous pulse oximetry  Central Line Procedure  Laterality:right  Location:internal jugular  Catheter Type:Vestal-Rashaad  Assessment  Complications:no  Patient Tolerance:patient tolerated the procedure well with no apparent complications

## 2023-06-29 PROBLEM — I25.10 CORONARY ARTERY DISEASE INVOLVING NATIVE CORONARY ARTERY OF NATIVE HEART WITHOUT ANGINA PECTORIS: Status: ACTIVE | Noted: 2023-06-29

## 2023-06-29 PROBLEM — Z95.1 S/P CABG (CORONARY ARTERY BYPASS GRAFT): Status: ACTIVE | Noted: 2023-06-29

## 2023-06-29 PROBLEM — Z95.2 S/P AVR: Status: ACTIVE | Noted: 2023-06-29

## 2023-07-07 ENCOUNTER — TELEPHONE (OUTPATIENT)
Dept: CARDIOLOGY | Facility: CLINIC | Age: 73
End: 2023-07-07

## 2023-07-27 ENCOUNTER — TELEPHONE (OUTPATIENT)
Dept: CARDIOLOGY | Facility: CLINIC | Age: 73
End: 2023-07-27
Payer: MEDICARE

## 2023-07-27 DIAGNOSIS — I10 ESSENTIAL HYPERTENSION: Primary | ICD-10-CM

## 2023-07-27 RX ORDER — LOSARTAN POTASSIUM 25 MG/1
25 TABLET ORAL DAILY
Qty: 30 TABLET | Refills: 2 | Status: SHIPPED | OUTPATIENT
Start: 2023-07-27 | End: 2023-07-28

## 2023-07-27 NOTE — TELEPHONE ENCOUNTER
Patient calling stating his  BP has been running real high. He has an appt with Dr. An on 8/1/23 but thought he should call since  BP is High  150/77, 170/80, 175/88, 188/91  He is on Metoprolol succ 25mg once daily. Dr. Arce's office increased this when he was in to see them.   He was told to call us .      [phone]

## 2023-07-27 NOTE — TELEPHONE ENCOUNTER
I have sent in a prescription for losartan 25 mg daily.  He will take this in addition to the metoprolol.  He will need labs in 1 week.

## 2023-07-28 RX ORDER — GUAIFENESIN 600 MG/1
1200 TABLET, EXTENDED RELEASE ORAL 2 TIMES DAILY
COMMUNITY

## 2023-07-28 RX ORDER — VALSARTAN 160 MG/1
160 TABLET ORAL DAILY
COMMUNITY
End: 2023-07-28 | Stop reason: SDUPTHER

## 2023-07-28 RX ORDER — VALSARTAN 320 MG/1
320 TABLET ORAL DAILY
Qty: 30 TABLET | Refills: 2 | Status: SHIPPED | OUTPATIENT
Start: 2023-07-28

## 2023-07-28 NOTE — TELEPHONE ENCOUNTER
Fatuma I called patient back and told him to double up on his valsartan to 320mg once daily. Told him to take two of his 160mg tablets. Once daily.  He states he wants us to take care of his BP for now.

## 2023-07-28 NOTE — TELEPHONE ENCOUNTER
Fatuma patient calling back stating the pharmacist told him not to take the losartan because he is on Valsartan 160mg once daily. Which he states Dr. Goncalves put him on this July 7th    For his BP he states he takes Metoprolol succ 25mg once daily and valsartan 160mg once daily.

## 2023-07-28 NOTE — TELEPHONE ENCOUNTER
Valsartan was not on his medication list when I made this recommendation.  But that is accurate, he should not take both.  There should not be so many people making changes to his meds. Would he like for us or Dr. Goncalves to manage his blood pressure?    If he wants us to, he should double the Valsartan to 320 mg.      He will also need to let the pharmacy know to void the losartan.

## 2023-08-01 ENCOUNTER — LAB (OUTPATIENT)
Dept: LAB | Facility: HOSPITAL | Age: 73
End: 2023-08-01
Payer: MEDICARE

## 2023-08-01 ENCOUNTER — OFFICE VISIT (OUTPATIENT)
Dept: CARDIOLOGY | Facility: CLINIC | Age: 73
End: 2023-08-01
Payer: MEDICARE

## 2023-08-01 VITALS
DIASTOLIC BLOOD PRESSURE: 82 MMHG | BODY MASS INDEX: 28.58 KG/M2 | SYSTOLIC BLOOD PRESSURE: 148 MMHG | WEIGHT: 193 LBS | HEART RATE: 79 BPM | HEIGHT: 69 IN

## 2023-08-01 DIAGNOSIS — Z95.1 S/P CABG (CORONARY ARTERY BYPASS GRAFT): ICD-10-CM

## 2023-08-01 DIAGNOSIS — I10 ESSENTIAL HYPERTENSION: ICD-10-CM

## 2023-08-01 DIAGNOSIS — Z95.2 S/P AVR: ICD-10-CM

## 2023-08-01 DIAGNOSIS — R94.6 ABNORMAL RESULTS OF THYROID FUNCTION STUDIES: ICD-10-CM

## 2023-08-01 DIAGNOSIS — I25.112 ATHEROSCLEROSIS OF NATIVE CORONARY ARTERY OF NATIVE HEART WITH REFRACTORY ANGINA PECTORIS: ICD-10-CM

## 2023-08-01 DIAGNOSIS — I35.0 NONRHEUMATIC AORTIC VALVE STENOSIS: ICD-10-CM

## 2023-08-01 DIAGNOSIS — I35.0 NONRHEUMATIC AORTIC VALVE STENOSIS: Primary | ICD-10-CM

## 2023-08-01 DIAGNOSIS — E11.8 TYPE II DIABETES MELLITUS WITH COMPLICATION: ICD-10-CM

## 2023-08-01 LAB
ALBUMIN SERPL-MCNC: 4.5 G/DL (ref 3.5–5.2)
ALBUMIN/GLOB SERPL: 2 G/DL
ALP SERPL-CCNC: 112 U/L (ref 39–117)
ALT SERPL W P-5'-P-CCNC: 7 U/L (ref 1–41)
ANION GAP SERPL CALCULATED.3IONS-SCNC: 13 MMOL/L (ref 5–15)
AST SERPL-CCNC: 12 U/L (ref 1–40)
BASOPHILS # BLD AUTO: 0.04 10*3/MM3 (ref 0–0.2)
BASOPHILS NFR BLD AUTO: 0.5 % (ref 0–1.5)
BILIRUB SERPL-MCNC: 0.4 MG/DL (ref 0–1.2)
BUN SERPL-MCNC: 12 MG/DL (ref 8–23)
BUN/CREAT SERPL: 14.6 (ref 7–25)
CALCIUM SPEC-SCNC: 9.8 MG/DL (ref 8.6–10.5)
CHLORIDE SERPL-SCNC: 100 MMOL/L (ref 98–107)
CO2 SERPL-SCNC: 30 MMOL/L (ref 22–29)
CREAT SERPL-MCNC: 0.82 MG/DL (ref 0.76–1.27)
DEPRECATED RDW RBC AUTO: 41.2 FL (ref 37–54)
EGFRCR SERPLBLD CKD-EPI 2021: 92.2 ML/MIN/1.73
EOSINOPHIL # BLD AUTO: 0.32 10*3/MM3 (ref 0–0.4)
EOSINOPHIL NFR BLD AUTO: 4.2 % (ref 0.3–6.2)
ERYTHROCYTE [DISTWIDTH] IN BLOOD BY AUTOMATED COUNT: 13.7 % (ref 12.3–15.4)
GLOBULIN UR ELPH-MCNC: 2.3 GM/DL
GLUCOSE SERPL-MCNC: 144 MG/DL (ref 65–99)
HCT VFR BLD AUTO: 39.3 % (ref 37.5–51)
HGB BLD-MCNC: 12.7 G/DL (ref 13–17.7)
IMM GRANULOCYTES # BLD AUTO: 0.05 10*3/MM3 (ref 0–0.05)
IMM GRANULOCYTES NFR BLD AUTO: 0.7 % (ref 0–0.5)
LYMPHOCYTES # BLD AUTO: 1.24 10*3/MM3 (ref 0.7–3.1)
LYMPHOCYTES NFR BLD AUTO: 16.3 % (ref 19.6–45.3)
MCH RBC QN AUTO: 27 PG (ref 26.6–33)
MCHC RBC AUTO-ENTMCNC: 32.3 G/DL (ref 31.5–35.7)
MCV RBC AUTO: 83.6 FL (ref 79–97)
MONOCYTES # BLD AUTO: 0.73 10*3/MM3 (ref 0.1–0.9)
MONOCYTES NFR BLD AUTO: 9.6 % (ref 5–12)
NEUTROPHILS NFR BLD AUTO: 5.24 10*3/MM3 (ref 1.7–7)
NEUTROPHILS NFR BLD AUTO: 68.7 % (ref 42.7–76)
NRBC BLD AUTO-RTO: 0 /100 WBC (ref 0–0.2)
NT-PROBNP SERPL-MCNC: 1529 PG/ML (ref 0–900)
PLATELET # BLD AUTO: 272 10*3/MM3 (ref 140–450)
PMV BLD AUTO: 9.2 FL (ref 6–12)
POTASSIUM SERPL-SCNC: 4.1 MMOL/L (ref 3.5–5.2)
PROT SERPL-MCNC: 6.8 G/DL (ref 6–8.5)
RBC # BLD AUTO: 4.7 10*6/MM3 (ref 4.14–5.8)
SODIUM SERPL-SCNC: 143 MMOL/L (ref 136–145)
TSH SERPL DL<=0.05 MIU/L-ACNC: 0.93 UIU/ML (ref 0.27–4.2)
WBC NRBC COR # BLD: 7.62 10*3/MM3 (ref 3.4–10.8)

## 2023-08-01 PROCEDURE — 99214 OFFICE O/P EST MOD 30 MIN: CPT | Performed by: INTERNAL MEDICINE

## 2023-08-01 PROCEDURE — 84443 ASSAY THYROID STIM HORMONE: CPT

## 2023-08-01 PROCEDURE — 3077F SYST BP >= 140 MM HG: CPT | Performed by: INTERNAL MEDICINE

## 2023-08-01 PROCEDURE — 85025 COMPLETE CBC W/AUTO DIFF WBC: CPT

## 2023-08-01 PROCEDURE — 80053 COMPREHEN METABOLIC PANEL: CPT

## 2023-08-01 PROCEDURE — 3079F DIAST BP 80-89 MM HG: CPT | Performed by: INTERNAL MEDICINE

## 2023-08-01 PROCEDURE — 36415 COLL VENOUS BLD VENIPUNCTURE: CPT

## 2023-08-01 PROCEDURE — 83880 ASSAY OF NATRIURETIC PEPTIDE: CPT

## 2023-08-01 PROCEDURE — 93000 ELECTROCARDIOGRAM COMPLETE: CPT | Performed by: INTERNAL MEDICINE

## 2023-08-01 RX ORDER — SENNOSIDES 8.6 MG
650 CAPSULE ORAL EVERY 8 HOURS PRN
COMMUNITY

## 2023-08-07 ENCOUNTER — TELEPHONE (OUTPATIENT)
Dept: CARDIOLOGY | Facility: CLINIC | Age: 73
End: 2023-08-07
Payer: MEDICARE

## 2023-08-07 RX ORDER — POTASSIUM CHLORIDE 1500 MG/1
TABLET, EXTENDED RELEASE ORAL
Qty: 21 TABLET | Refills: 0 | OUTPATIENT
Start: 2023-08-07

## 2023-08-07 RX ORDER — FUROSEMIDE 20 MG/1
40 TABLET ORAL DAILY
Qty: 42 TABLET | Refills: 0 | OUTPATIENT
Start: 2023-08-07 | End: 2023-08-28

## 2023-08-08 ENCOUNTER — TELEPHONE (OUTPATIENT)
Dept: CARDIOLOGY | Facility: CLINIC | Age: 73
End: 2023-08-08

## 2023-08-08 RX ORDER — FUROSEMIDE 40 MG/1
40 TABLET ORAL DAILY
Qty: 30 TABLET | Refills: 5 | Status: SHIPPED | OUTPATIENT
Start: 2023-08-08 | End: 2023-08-09 | Stop reason: SDUPTHER

## 2023-08-08 NOTE — TELEPHONE ENCOUNTER
"  Caller: Alfredo Toure \"REYNA\"    Relationship: Self    Best call back number: [phone]    What is the best time to reach you: any    Who are you requesting to speak with (clinical staff, provider,  specific staff member): clinical    Do you know the name of the person who called: DR. LEE    What was the call regarding: TEST RESULTS    Is it okay if the provider responds through MyChart: NO          "

## 2023-08-09 ENCOUNTER — TELEPHONE (OUTPATIENT)
Dept: CARDIOLOGY | Facility: CLINIC | Age: 73
End: 2023-08-09
Payer: MEDICARE

## 2023-08-09 DIAGNOSIS — I10 PRIMARY HYPERTENSION: Primary | ICD-10-CM

## 2023-08-09 RX ORDER — LOSARTAN POTASSIUM 50 MG/1
50 TABLET ORAL DAILY
Qty: 90 TABLET | Refills: 3 | Status: SHIPPED | OUTPATIENT
Start: 2023-08-09 | End: 2023-08-09

## 2023-08-09 RX ORDER — AMLODIPINE BESYLATE 5 MG/1
5 TABLET ORAL DAILY
Qty: 90 TABLET | Refills: 3 | Status: SHIPPED | OUTPATIENT
Start: 2023-08-09

## 2023-08-09 RX ORDER — FUROSEMIDE 40 MG/1
40 TABLET ORAL DAILY
Qty: 30 TABLET | Refills: 5 | Status: SHIPPED | OUTPATIENT
Start: 2023-08-09 | End: 2024-02-05

## 2023-08-09 NOTE — TELEPHONE ENCOUNTER
Dr. An patient is on Valsartan 320mg once daily along with his potassium  Pharm has told him he shouldn't take Losartan along with the Valsartan  Aslo patient this is from Christina July 28th note see below.    Fatuma guzman, APRN         12:47 PM  Note     Valsartan was not on his medication list when I made this recommendation.  But that is accurate, he should not take both.  There should not be so many people making changes to his meds. Would he like for us or Dr. Goncalves to manage his blood pressure?     If he wants us to, he should double the Valsartan to 320 mg.       He will also need to let the pharmacy know to void the losartan.

## 2023-08-11 ENCOUNTER — TELEPHONE (OUTPATIENT)
Dept: CARDIOLOGY | Facility: CLINIC | Age: 73
End: 2023-08-11
Payer: MEDICARE

## 2023-08-11 RX ORDER — POTASSIUM CHLORIDE 20 MEQ/1
20 TABLET, EXTENDED RELEASE ORAL DAILY
Qty: 90 TABLET | Refills: 3 | Status: SHIPPED | OUTPATIENT
Start: 2023-08-11

## 2023-08-11 NOTE — TELEPHONE ENCOUNTER
Yue cardiac rehab nurse is calling because the patient reports that he was recently put back on furosemide 40 mg daily, but his potassium prescription is  and he needs a refill.    He was taking Klor-Con 20 meq daily.  If you want him on potassium, can you send in a new script to [facility]?    Alta Segal RN  [facility] Triage  23 10:40 EDT'

## 2023-08-14 NOTE — TELEPHONE ENCOUNTER
Notified patient of recommendations. Patient verbalized understanding.    Sheila Coleman RN  Triage [facility]

## 2023-08-14 NOTE — TELEPHONE ENCOUNTER
Attempted to call Alfredo Toure, no answer.  Left a voicemail for patient to call back.  Will continue to try to reach patient.    Alta Segal RN  [facility] Triage  08/14/23 10:43 EDT

## 2023-08-24 ENCOUNTER — LAB (OUTPATIENT)
Dept: LAB | Facility: HOSPITAL | Age: 73
End: 2023-08-24
Payer: MEDICARE

## 2023-08-24 ENCOUNTER — HOSPITAL ENCOUNTER (OUTPATIENT)
Dept: CARDIOLOGY | Facility: HOSPITAL | Age: 73
Discharge: HOME | End: 2023-08-24
Payer: MEDICARE

## 2023-08-24 ENCOUNTER — TELEPHONE (OUTPATIENT)
Dept: CARDIOLOGY | Facility: CLINIC | Age: 73
End: 2023-08-24
Payer: MEDICARE

## 2023-08-24 VITALS
HEART RATE: 82 BPM | SYSTOLIC BLOOD PRESSURE: 142 MMHG | DIASTOLIC BLOOD PRESSURE: 78 MMHG | WEIGHT: 193 LBS | BODY MASS INDEX: 28.58 KG/M2 | HEIGHT: 69 IN

## 2023-08-24 DIAGNOSIS — I35.0 NONRHEUMATIC AORTIC VALVE STENOSIS: ICD-10-CM

## 2023-08-24 DIAGNOSIS — Z95.2 S/P AVR: ICD-10-CM

## 2023-08-24 DIAGNOSIS — Z95.1 S/P CABG (CORONARY ARTERY BYPASS GRAFT): ICD-10-CM

## 2023-08-24 DIAGNOSIS — I10 PRIMARY HYPERTENSION: ICD-10-CM

## 2023-08-24 LAB
ANION GAP SERPL CALCULATED.3IONS-SCNC: 10 MMOL/L (ref 5–15)
BUN SERPL-MCNC: 16 MG/DL (ref 8–23)
BUN/CREAT SERPL: 19 (ref 7–25)
CALCIUM SPEC-SCNC: 9.4 MG/DL (ref 8.6–10.5)
CHLORIDE SERPL-SCNC: 100 MMOL/L (ref 98–107)
CO2 SERPL-SCNC: 30 MMOL/L (ref 22–29)
CREAT SERPL-MCNC: 0.84 MG/DL (ref 0.76–1.27)
EGFRCR SERPLBLD CKD-EPI 2021: 91.5 ML/MIN/1.73
GLUCOSE SERPL-MCNC: 173 MG/DL (ref 65–99)
POTASSIUM SERPL-SCNC: 4.3 MMOL/L (ref 3.5–5.2)
SODIUM SERPL-SCNC: 140 MMOL/L (ref 136–145)

## 2023-08-24 PROCEDURE — 36415 COLL VENOUS BLD VENIPUNCTURE: CPT

## 2023-08-24 PROCEDURE — 80048 BASIC METABOLIC PNL TOTAL CA: CPT

## 2023-08-24 PROCEDURE — 93306 TTE W/DOPPLER COMPLETE: CPT

## 2023-08-24 NOTE — TELEPHONE ENCOUNTER
I called patient and told him his labs look fine. Per WD             Message  Received: Today  Loc An MD Bishop, Rita, MA  Labs look fine   Associated Results     Contains abnormal data Basic Metabolic Panel  Order: [id number]  Status: Final result       Visible to patient: Yes (seen)       Dx: Primary hypertension    Specimen Information: Blood   1 Result Note            Component  Ref Range & Units 12:58  (8/24/23) 3 wk ago  (8/1/23) 1 mo ago  (7/5/23) 2 mo ago  (6/21/23) 2 mo ago  (6/21/23) 2 mo ago  (6/20/23) 2 mo ago  (6/20/23)   Glucose  65 - 99 mg/dL 173 High  144 High  98 162 High  R,  High  189 High  R,  High  R, CM   BUN  8 - 23 mg/dL 16 12 9  22     Creatinine  0.76 - 1.27 mg/dL 0.84 0.82 0.87  0.63 Low      Sodium  136 - 145 mmol/L 140 143 144  139     Potassium  3.5 - 5.2 mmol/L 4.3 4.1 3.9  4.0     Chloride  98 - 107 mmol/L 100 100 100  105     CO2  22.0 - 29.0 mmol/L 30.0 High  30.0 High  23.0  27.9     Calcium  8.6 - 10.5 mg/dL 9.4 9.8 9.4  8.6     BUN/Creatinine Ratio  7.0 - 25.0 19.0 14.6 10.3  34.9 High      Anion Gap  5.0 - 15.0 mmol/L 10.0 13.0 21.0 High   6.1     eGFR  >60.0 mL/min/1.73 91.5 92.2 90.5  99.8     Resulting Agency  [facility]  [facility]  [facility]  [facility]  [facility]  [facility]  [facility]           Narrative  Performed by:  [facility]  GFR Normal >60  Chronic Kidney Disease <60  Kidney Failure <15    The GFR formula is only valid for adults with stable renal function between ages 18 and 70.      Specimen Collected: 08/24/23 12:58 EDT Last Resulted: 08/24/23 13:44 EDT        Lab Flowsheet        Order Details        View Encounter        Lab and Collection Details        Routing        Result History     View Encounter Conversation        CM=Additional comments  R=Reference range differs from displayed range        Result Care Coordination      Result Notes

## 2023-08-25 LAB
AORTIC ARCH: 2.4 CM
AORTIC DIMENSIONLESS INDEX: 0.5 (DI)
ASCENDING AORTA: 3.1 CM
BH CV ECHO AV AORTIC VALVE AT ACCEL TIME CALCULATED: NORMAL MSEC
BH CV ECHO MEAS - AO MAX PG: 13.1 MMHG
BH CV ECHO MEAS - AO MEAN PG: 6.1 MMHG
BH CV ECHO MEAS - AO ROOT DIAM: 3 CM
BH CV ECHO MEAS - AO V2 MAX: 181 CM/SEC
BH CV ECHO MEAS - AO V2 VTI: 32.4 CM
BH CV ECHO MEAS - AT: 98 SEC
BH CV ECHO MEAS - AVA(I,D): 1.94 CM2
BH CV ECHO MEAS - EDV(CUBED): 148.9 ML
BH CV ECHO MEAS - EDV(MOD-SP2): 100 ML
BH CV ECHO MEAS - EDV(MOD-SP4): 121 ML
BH CV ECHO MEAS - EF(MOD-BP): 51.5 %
BH CV ECHO MEAS - EF(MOD-SP2): 48 %
BH CV ECHO MEAS - EF(MOD-SP4): 49.6 %
BH CV ECHO MEAS - ESV(CUBED): 92.1 ML
BH CV ECHO MEAS - ESV(MOD-SP2): 52 ML
BH CV ECHO MEAS - ESV(MOD-SP4): 61 ML
BH CV ECHO MEAS - FS: 14.8 %
BH CV ECHO MEAS - IVS/LVPW: 1 CM
BH CV ECHO MEAS - IVSD: 1.4 CM
BH CV ECHO MEAS - LAT PEAK E' VEL: 10.3 CM/SEC
BH CV ECHO MEAS - LV DIASTOLIC VOL/BSA (35-75): 59.5 CM2
BH CV ECHO MEAS - LV MASS(C)D: 318.9 GRAMS
BH CV ECHO MEAS - LV MAX PG: 3.2 MMHG
BH CV ECHO MEAS - LV MEAN PG: 2 MMHG
BH CV ECHO MEAS - LV SYSTOLIC VOL/BSA (12-30): 30 CM2
BH CV ECHO MEAS - LV V1 MAX: 90 CM/SEC
BH CV ECHO MEAS - LV V1 VTI: 15.8 CM
BH CV ECHO MEAS - LVIDD: 5.3 CM
BH CV ECHO MEAS - LVIDS: 4.5 CM
BH CV ECHO MEAS - LVOT AREA: 4 CM2
BH CV ECHO MEAS - LVOT DIAM: 2.25 CM
BH CV ECHO MEAS - LVPWD: 1.4 CM
BH CV ECHO MEAS - MED PEAK E' VEL: 7.9 CM/SEC
BH CV ECHO MEAS - MV A DUR: 0.12 SEC
BH CV ECHO MEAS - MV A MAX VEL: 78.4 CM/SEC
BH CV ECHO MEAS - MV DEC SLOPE: 350.6 CM/SEC2
BH CV ECHO MEAS - MV DEC TIME: 0.25 MSEC
BH CV ECHO MEAS - MV E MAX VEL: 64.7 CM/SEC
BH CV ECHO MEAS - MV E/A: 0.83
BH CV ECHO MEAS - MV MAX PG: 3.1 MMHG
BH CV ECHO MEAS - MV MEAN PG: 2.03 MMHG
BH CV ECHO MEAS - MV P1/2T: 73.2 MSEC
BH CV ECHO MEAS - MV V2 VTI: 21.3 CM
BH CV ECHO MEAS - MVA(P1/2T): 3 CM2
BH CV ECHO MEAS - MVA(VTI): 2.9 CM2
BH CV ECHO MEAS - PA ACC TIME: 0.11 SEC
BH CV ECHO MEAS - PA V2 MAX: 101.8 CM/SEC
BH CV ECHO MEAS - PULM A REVS DUR: 0.09 SEC
BH CV ECHO MEAS - PULM A REVS VEL: 26.6 CM/SEC
BH CV ECHO MEAS - PULM DIAS VEL: 40.3 CM/SEC
BH CV ECHO MEAS - PULM S/D: 1.16
BH CV ECHO MEAS - PULM SYS VEL: 46.7 CM/SEC
BH CV ECHO MEAS - QP/QS: 0.77
BH CV ECHO MEAS - RV MAX PG: 1.57 MMHG
BH CV ECHO MEAS - RV V1 MAX: 62.6 CM/SEC
BH CV ECHO MEAS - RV V1 VTI: 11.9 CM
BH CV ECHO MEAS - RVOT DIAM: 2.27 CM
BH CV ECHO MEAS - SI(MOD-SP2): 23.6 ML/M2
BH CV ECHO MEAS - SI(MOD-SP4): 29.5 ML/M2
BH CV ECHO MEAS - SUP REN AO DIAM: 2.6 CM
BH CV ECHO MEAS - SV(LVOT): 62.7 ML
BH CV ECHO MEAS - SV(MOD-SP2): 48 ML
BH CV ECHO MEAS - SV(MOD-SP4): 60 ML
BH CV ECHO MEAS - SV(RVOT): 48.2 ML
BH CV ECHO MEAS - TAPSE (>1.6): 1.17 CM
BH CV ECHO MEASUREMENTS AVERAGE E/E' RATIO: 7.11
BH CV XLRA - RV BASE: 3.5 CM
BH CV XLRA - RV LENGTH: 8.3 CM
BH CV XLRA - RV MID: 3.2 CM
BH CV XLRA - TDI S': 10.6 CM/SEC
LEFT ATRIUM VOLUME INDEX: 20.4 ML/M2
SINUS: 3.6 CM
STJ: 3 CM

## 2023-09-20 ENCOUNTER — OFFICE VISIT (OUTPATIENT)
Dept: CARDIOLOGY | Facility: CLINIC | Age: 73
End: 2023-09-20
Payer: MEDICARE

## 2023-09-20 VITALS
HEART RATE: 68 BPM | WEIGHT: 197.4 LBS | HEIGHT: 69 IN | OXYGEN SATURATION: 99 % | SYSTOLIC BLOOD PRESSURE: 154 MMHG | BODY MASS INDEX: 29.24 KG/M2 | DIASTOLIC BLOOD PRESSURE: 69 MMHG

## 2023-09-20 DIAGNOSIS — I25.10 CORONARY ARTERY DISEASE INVOLVING NATIVE CORONARY ARTERY OF NATIVE HEART WITHOUT ANGINA PECTORIS: Primary | ICD-10-CM

## 2023-09-20 DIAGNOSIS — I10 ESSENTIAL HYPERTENSION: ICD-10-CM

## 2023-09-20 DIAGNOSIS — Z95.1 S/P CABG (CORONARY ARTERY BYPASS GRAFT): ICD-10-CM

## 2023-09-20 DIAGNOSIS — Z95.2 S/P AVR: ICD-10-CM

## 2023-09-20 PROCEDURE — 3078F DIAST BP <80 MM HG: CPT | Performed by: NURSE PRACTITIONER

## 2023-09-20 PROCEDURE — 1160F RVW MEDS BY RX/DR IN RCRD: CPT | Performed by: NURSE PRACTITIONER

## 2023-09-20 PROCEDURE — 99214 OFFICE O/P EST MOD 30 MIN: CPT | Performed by: NURSE PRACTITIONER

## 2023-09-20 PROCEDURE — 3077F SYST BP >= 140 MM HG: CPT | Performed by: NURSE PRACTITIONER

## 2023-09-20 PROCEDURE — 93000 ELECTROCARDIOGRAM COMPLETE: CPT | Performed by: NURSE PRACTITIONER

## 2023-09-20 PROCEDURE — 1159F MED LIST DOCD IN RCRD: CPT | Performed by: NURSE PRACTITIONER

## 2023-09-20 NOTE — PROGRESS NOTES
Date of Office Visit: 2023  Encounter Provider: WICHO Lipscomb  Place of Service: [facility] CARDIOLOGY  Patient Name: Alfredo Toure  :1950    Chief Complaint   Patient presents with    Coronary Artery Disease   :     HPI: Alfredo Toure is a 73 y.o. male patient of Dr. An's whom we have followed for a bicuspid aortic valve and thoracic aortic aneurysm. He also has a history of high degree AV block for which he underwent permanent pacemaker in .    In May of this year, he was referred for a right and left heart catheterization due to suspected severe aortic stenosis.  This demonstrated severe aortic stenosis and two-vessel coronary disease.  He was referred for surgery.  On , he underwent a CABG x 3 (LIMA to the mid LAD, ascending aortic aneurysm repair, aortic valve replacement, and left atrial appendage ligation).     He was last seen in the office by Dr. An at which time he was doing okay.  He reported distant edema in both of his feet.  Dr. An ordered labs and an echocardiogram.  Lasix was increased.  He was advised to follow-up in 6 weeks.    He has been doing well.  He denies any chest pain, shortness of breath, palpitations, edema, dizziness, or syncope.  His blood pressures at cardiac rehab and at home have been better than today, mostly in the 120s and 130s systolic.  He is asking about medication for erectile dysfunction.    Past Medical History:   Diagnosis Date    Abnormal ECG 01/15/2019    40 beats per minute    Aneurysm 2010    No. Change in 12 years    Aortic stenosis     Arrhythmia     2ND DEGREE HEART BLOCK    Arthritis     HANDS    Ascending aortic aneurysm     Bleeding disorder     Bruise easily, thin blood    Bradycardia     Cancer     Melanoma, removed    Chronic cough     ALLERGIES    Colon polyp     Coronary artery disease     Diabetes mellitus     Diverticulitis     GERD (gastroesophageal reflux disease)      Heart murmur     Hiatal hernia     History of melanoma     BACK    Hyperlipidemia     Hypertension     Mitral valve prolapse     Pacemaker     Peptic ulceration     Post-nasal drip     Shortness of breath on exertion        Past Surgical History:   Procedure Laterality Date    AORTIC VALVE REPAIR/REPLACEMENT  06/14/2023    ASCENDING AORTIC ANEURYSM REPAIR W/ MECHANICAL AORTIC VALVE REPLACEMENT N/A 06/14/2023    Procedure: STERNOTOMY;  AORTIC ANEURYSM REPAIR AORTIC VALVE REPLACEMENT; AMPUTATION OF LEFT ATRIAL APPENDAGE;  TRANSESOPHAGEAL ECHOCARDIOGRAM WITH ANESTHESIA; PRP;  Surgeon: Ky Arce MD;  Location: [facility] CVOR;  Service: Cardiothoracic;  Laterality: N/A;    CARDIAC CATHETERIZATION      CARDIAC CATHETERIZATION N/A 05/22/2023    Procedure: Coronary angiography;  Surgeon: Loc An MD;  Location: [facility] CATH INVASIVE LOCATION;  Service: Cardiology;  Laterality: N/A;    CARDIAC CATHETERIZATION N/A 05/22/2023    Procedure: Left Heart Cath;  Surgeon: Loc An MD;  Location: [facility] CATH INVASIVE LOCATION;  Service: Cardiology;  Laterality: N/A;    CARDIAC ELECTROPHYSIOLOGY PROCEDURE Left 02/15/2019    Procedure: Pacemaker DC new- [employer]tr[employer];  Surgeon: Zbigniew Tam MD;  Location: [facility] CATH INVASIVE LOCATION;  Service: Cardiology    CATARACT EXTRACTION WITH INTRAOCULAR LENS IMPLANT Bilateral     CHOLECYSTECTOMY      COLONOSCOPY  2014    COLONOSCOPY N/A 04/26/2021    Procedure: COLONOSCOPY to cecum with cold polypectomy;  Surgeon: Zbigniew Washington MD;  Location: [facility] ENDOSCOPY;  Service: General;  Laterality: N/A;  Pre: h/x of polyps  Post: diverticulosis. polyp    CORONARY ARTERY BYPASS GRAFT N/A 06/14/2023    Procedure: STERNOTOMY; CORONARY ARTERY BYPASS X 3 WITH INTERNAL MAMMARY ARTERY GRAFT AND ENDOSCOPIC SAPHENOUS VEIN HARVEST FROM RIGHT LEG;  Surgeon: Ky Arce MD;  Location: [facility] CVOR;  Service: Cardiothoracic;  Laterality: N/A;    ENDOSCOPY N/A 04/26/2021     Procedure: ESOPHAGOGASTRODUODENOSCOPY with biopsies;  Surgeon: Zbigniew Washington MD;  Location: [facility] ENDOSCOPY;  Service: General;  Laterality: N/A;  Pre: reflux  Post: mild gastritis, hiatal hernia    ERCP      GALLSTONES    INSERT / REPLACE / REMOVE PACEMAKER      [facility] has records    LUMBAR DISCECTOMY      VEIN SURGERY  06/14/2023       Social History     Socioeconomic History    Marital status:    Tobacco Use    Smoking status: Never    Smokeless tobacco: Never    Tobacco comments:     Daily caffeine daily   Vaping Use    Vaping Use: Never used   Substance and Sexual Activity    Alcohol use: Yes     Comment: on occasion - monthly.    Drug use: No    Sexual activity: Yes     Partners: Female       Family History   Problem Relation Age of Onset    Colon cancer Maternal Uncle     [place] Hyperthermia Neg Hx        Review of Systems   Constitutional: Negative.   Cardiovascular: Negative.  Negative for chest pain, dyspnea on exertion, leg swelling, orthopnea, paroxysmal nocturnal dyspnea and syncope.   Respiratory: Negative.     Hematologic/Lymphatic: Negative for bleeding problem.   Musculoskeletal:  Negative for falls.   Gastrointestinal:  Negative for melena.   Neurological:  Negative for dizziness and light-headedness.     No Known Allergies      Current Outpatient Medications:     acetaminophen (TYLENOL) 650 MG 8 hr tablet, Take 1 tablet by mouth Every 8 (Eight) Hours As Needed for Mild Pain., Disp: , Rfl:     amLODIPine (NORVASC) 5 MG tablet, Take 1 tablet by mouth Daily., Disp: 90 tablet, Rfl: 3    aspirin 81 MG EC tablet, Take 1 tablet by mouth Daily. PER MD OFFICE PT DOESN'T HAVE TO HOLD FOR SURGERY, Disp: , Rfl:     FARXIGA 10 MG tablet, Take 10 mg by mouth Daily., Disp: , Rfl: 3    finasteride (PROSCAR) 5 MG tablet, Take 1 tablet by mouth Daily., Disp: , Rfl:     furosemide (LASIX) 40 MG tablet, Take 1 tablet by mouth Daily for 180 days., Disp: 30 tablet, Rfl: 5    metFORMIN (GLUCOPHAGE) 1000  "MG tablet, Take 1 tablet by mouth 2 (Two) Times a Day., Disp: , Rfl: 3    metoprolol succinate XL (TOPROL-XL) 25 MG 24 hr tablet, Take 1 tablet by mouth Daily for 90 days., Disp: 30 tablet, Rfl: 2    omeprazole (PRILOSEC) 40 MG capsule, Take 1 capsule by mouth 2 (Two) Times a Day., Disp: 60 capsule, Rfl: 5    potassium chloride (K-DUR,KLOR-CON) 20 MEQ CR tablet, Take 1 tablet by mouth Daily., Disp: 90 tablet, Rfl: 3    rosuvastatin (CRESTOR) 20 MG tablet, Take 1 tablet by mouth Every Night., Disp: 30 tablet, Rfl: 3    valsartan (DIOVAN) 320 MG tablet, Take 1 tablet by mouth Daily., Disp: 30 tablet, Rfl: 2  No current facility-administered medications for this visit.    Facility-Administered Medications Ordered in Other Visits:     Chlorhexidine Gluconate Cloth 2 % pads 1 application, 1 application , Topical, Q12H PRN, Shefali Campuzano APRN      Objective:     Vitals:    09/20/23 1329   BP: 154/69   BP Location: Right arm   Patient Position: Sitting   Cuff Size: Adult   Pulse: 68   SpO2: 99%   Weight: 89.5 kg (197 lb 6.4 oz)   Height: 175.3 cm (69\")     Body mass index is 29.15 kg/m².    PHYSICAL EXAM:    Neck:      Vascular: No JVD.   Pulmonary:      Effort: Pulmonary effort is normal.      Breath sounds: Normal breath sounds.   Cardiovascular:      Normal rate. Regular rhythm.      Murmurs: There is a systolic murmur.      No gallop.  No click. No rub.   Pulses:     Intact distal pulses.         ECG 12 Lead    Date/Time: 9/20/2023 1:39 PM  Performed by: Fatuma Lyles APRN  Authorized by: Fatuma Lyles APRN   Comparison: compared with previous ECG from 8/1/2023  Similar to previous ECG  Rhythm: paced  Rate: normal  BPM: 68          Assessment:       Diagnosis Plan   1. Coronary artery disease involving native coronary artery of native heart without angina pectoris  ECG 12 Lead      2. S/P CABG (coronary artery bypass graft)        3. S/P AVR        4. Essential hypertension          Orders Placed " This Encounter   Procedures    ECG 12 Lead     This order was created via procedure documentation     Order Specific Question:   Release to patient     Answer:   Routine Release [[id number]]          Plan:       1.  Coronary artery disease.  Status post CABG x3 in June.  He denies any symptoms of angina.  Continue aspirin and rosuvastatin.      2.  Status post AVR.  Echocardiogram from August demonstrated a well-functioning prosthetic valve.      3.  Hypertension.  His blood pressure is high today.  However, it is normally much better controlled.  Continue amlodipine, metoprolol, and valsartan.      I think he is doing well.  From our standpoint it would be fine to use erectile dysfunction medications.  I am not making any changes today, he will follow-up with Dr. An in 6 months.      As always, it has been a pleasure to participate in your patient's care.      Sincerely,         WICHO Araujo

## 2023-09-27 ENCOUNTER — TELEPHONE (OUTPATIENT)
Dept: CARDIOLOGY | Facility: CLINIC | Age: 73
End: 2023-09-27

## 2023-09-27 NOTE — TELEPHONE ENCOUNTER
After reviewing most recent remote, pt had some changes to his auto lead testing, AF events, and NSVT events recorded.       Pt has a MDT dual chamber pacer with a RV HIS lead    The RA lead is on auto and the RV HIS lead is on monitor. It appears that there was an increase in thresholds around June for both leads but now appear to be stable.             All impedances appear to be stable. There were 255 short intervals since 6/14/23.         There were 51 AT/AF events on the counters, 8 on the logbook. Available EGMs were reviewed and appear to be true AF/AFL.         Longest event occurred on 6/29/23 and lasted for 18 hours, 17 minutes, and 37 seconds with an average v.rate of 61 bpm.     All other events lasted between 32 seconds-1 hour, 59 minutes and 24 seconds. Average v.rates for all events were in the 60s.     The last events occurred on 7/10/23 with 3 events on this day.     AT/AF burden: 0.9%, on ASA only                           There were also 3 NSVT events recorded occurring on:    7/6/23 lasting for 6 beats with an average v.rate of 188 bpm    7/20/23 lasting for 16 beats with an average v.rate of 159 bpm          9/13/23 lasting for 11 beats with an average v.rate of 171 bpm           Pt's next appt is with Dr. An and a device check scheduled for 3/22/24

## 2023-10-02 RX ORDER — AMOXICILLIN 500 MG/1
2000 CAPSULE ORAL SEE ADMIN INSTRUCTIONS
Qty: 12 CAPSULE | Refills: 2 | Status: SHIPPED | OUTPATIENT
Start: 2023-10-02

## 2023-10-06 PROCEDURE — 93296 REM INTERROG EVL PM/IDS: CPT | Performed by: INTERNAL MEDICINE

## 2023-10-06 PROCEDURE — 93294 REM INTERROG EVL PM/LDLS PM: CPT | Performed by: INTERNAL MEDICINE

## 2023-10-30 RX ORDER — VALSARTAN 320 MG/1
320 TABLET ORAL DAILY
Qty: 90 TABLET | Refills: 1 | Status: SHIPPED | OUTPATIENT
Start: 2023-10-30

## 2023-10-30 RX ORDER — ROSUVASTATIN CALCIUM 20 MG/1
20 TABLET, COATED ORAL
Qty: 90 TABLET | Refills: 1 | OUTPATIENT
Start: 2023-10-30

## 2023-11-14 RX ORDER — METOPROLOL SUCCINATE 25 MG/1
25 TABLET, EXTENDED RELEASE ORAL DAILY
Qty: 90 TABLET | OUTPATIENT
Start: 2023-11-14

## 2023-11-17 RX ORDER — ROSUVASTATIN CALCIUM 20 MG/1
20 TABLET, COATED ORAL
Qty: 90 TABLET | Refills: 1 | OUTPATIENT
Start: 2023-11-17

## 2023-11-30 ENCOUNTER — TRANSCRIBE ORDERS (OUTPATIENT)
Dept: ADMINISTRATIVE | Facility: HOSPITAL | Age: 73
End: 2023-11-30
Payer: MEDICARE

## 2023-11-30 ENCOUNTER — LAB (OUTPATIENT)
Dept: LAB | Facility: HOSPITAL | Age: 73
End: 2023-11-30
Payer: MEDICARE

## 2023-11-30 DIAGNOSIS — E11.9 DIABETES MELLITUS WITHOUT COMPLICATION: Primary | ICD-10-CM

## 2023-11-30 DIAGNOSIS — I10 ESSENTIAL HYPERTENSION, MALIGNANT: ICD-10-CM

## 2023-11-30 DIAGNOSIS — Z00.00 ROUTINE GENERAL MEDICAL EXAMINATION AT A HEALTH CARE FACILITY: ICD-10-CM

## 2023-11-30 DIAGNOSIS — E78.5 HYPERLIPIDEMIA, UNSPECIFIED HYPERLIPIDEMIA TYPE: ICD-10-CM

## 2023-11-30 DIAGNOSIS — E11.9 DIABETES MELLITUS WITHOUT COMPLICATION: ICD-10-CM

## 2023-11-30 LAB
ALBUMIN SERPL-MCNC: 4.2 G/DL (ref 3.5–5.2)
ALBUMIN/GLOB SERPL: 1.9 G/DL
ALP SERPL-CCNC: 95 U/L (ref 39–117)
ALT SERPL W P-5'-P-CCNC: 12 U/L (ref 1–41)
ANION GAP SERPL CALCULATED.3IONS-SCNC: 10 MMOL/L (ref 5–15)
AST SERPL-CCNC: 19 U/L (ref 1–40)
BACTERIA UR QL AUTO: ABNORMAL /HPF
BASOPHILS # BLD AUTO: 0.04 10*3/MM3 (ref 0–0.2)
BASOPHILS NFR BLD AUTO: 0.6 % (ref 0–1.5)
BILIRUB SERPL-MCNC: 0.4 MG/DL (ref 0–1.2)
BILIRUB UR QL STRIP: NEGATIVE
BUN SERPL-MCNC: 18 MG/DL (ref 8–23)
BUN/CREAT SERPL: 19.6 (ref 7–25)
CALCIUM SPEC-SCNC: 9.8 MG/DL (ref 8.6–10.5)
CHLORIDE SERPL-SCNC: 103 MMOL/L (ref 98–107)
CHOLEST SERPL-MCNC: 134 MG/DL (ref 0–200)
CLARITY UR: ABNORMAL
CO2 SERPL-SCNC: 28 MMOL/L (ref 22–29)
COLOR UR: YELLOW
CREAT SERPL-MCNC: 0.92 MG/DL (ref 0.76–1.27)
DEPRECATED RDW RBC AUTO: 43.8 FL (ref 37–54)
EGFRCR SERPLBLD CKD-EPI 2021: 86.7 ML/MIN/1.73
EOSINOPHIL # BLD AUTO: 0.18 10*3/MM3 (ref 0–0.4)
EOSINOPHIL NFR BLD AUTO: 2.8 % (ref 0.3–6.2)
ERYTHROCYTE [DISTWIDTH] IN BLOOD BY AUTOMATED COUNT: 15 % (ref 12.3–15.4)
GLOBULIN UR ELPH-MCNC: 2.2 GM/DL
GLUCOSE SERPL-MCNC: 136 MG/DL (ref 65–99)
GLUCOSE UR STRIP-MCNC: ABNORMAL MG/DL
HBA1C MFR BLD: 7 % (ref 4.8–5.6)
HCT VFR BLD AUTO: 41.5 % (ref 37.5–51)
HDLC SERPL-MCNC: 52 MG/DL (ref 40–60)
HGB BLD-MCNC: 13.6 G/DL (ref 13–17.7)
HGB UR QL STRIP.AUTO: ABNORMAL
HYALINE CASTS UR QL AUTO: ABNORMAL /LPF
IMM GRANULOCYTES # BLD AUTO: 0.04 10*3/MM3 (ref 0–0.05)
IMM GRANULOCYTES NFR BLD AUTO: 0.6 % (ref 0–0.5)
KETONES UR QL STRIP: ABNORMAL
LDLC SERPL CALC-MCNC: 65 MG/DL (ref 0–100)
LDLC/HDLC SERPL: 1.23 {RATIO}
LEUKOCYTE ESTERASE UR QL STRIP.AUTO: ABNORMAL
LYMPHOCYTES # BLD AUTO: 1.25 10*3/MM3 (ref 0.7–3.1)
LYMPHOCYTES NFR BLD AUTO: 19.6 % (ref 19.6–45.3)
MCH RBC QN AUTO: 26.6 PG (ref 26.6–33)
MCHC RBC AUTO-ENTMCNC: 32.8 G/DL (ref 31.5–35.7)
MCV RBC AUTO: 81.2 FL (ref 79–97)
MONOCYTES # BLD AUTO: 0.48 10*3/MM3 (ref 0.1–0.9)
MONOCYTES NFR BLD AUTO: 7.5 % (ref 5–12)
NEUTROPHILS NFR BLD AUTO: 4.4 10*3/MM3 (ref 1.7–7)
NEUTROPHILS NFR BLD AUTO: 68.9 % (ref 42.7–76)
NITRITE UR QL STRIP: POSITIVE
NRBC BLD AUTO-RTO: 0 /100 WBC (ref 0–0.2)
PH UR STRIP.AUTO: 5.5 [PH] (ref 5–8)
PLATELET # BLD AUTO: 202 10*3/MM3 (ref 140–450)
PMV BLD AUTO: 10 FL (ref 6–12)
POTASSIUM SERPL-SCNC: 4.6 MMOL/L (ref 3.5–5.2)
PROT SERPL-MCNC: 6.4 G/DL (ref 6–8.5)
PROT UR QL STRIP: ABNORMAL
RBC # BLD AUTO: 5.11 10*6/MM3 (ref 4.14–5.8)
RBC # UR STRIP: ABNORMAL /HPF
REF LAB TEST METHOD: ABNORMAL
SODIUM SERPL-SCNC: 141 MMOL/L (ref 136–145)
SP GR UR STRIP: >=1.03 (ref 1–1.03)
SQUAMOUS #/AREA URNS HPF: ABNORMAL /HPF
TRIGL SERPL-MCNC: 89 MG/DL (ref 0–150)
UROBILINOGEN UR QL STRIP: ABNORMAL
VLDLC SERPL-MCNC: 17 MG/DL (ref 5–40)
WBC # UR STRIP: ABNORMAL /HPF
WBC NRBC COR # BLD AUTO: 6.39 10*3/MM3 (ref 3.4–10.8)

## 2023-11-30 PROCEDURE — 85025 COMPLETE CBC W/AUTO DIFF WBC: CPT

## 2023-11-30 PROCEDURE — 80061 LIPID PANEL: CPT

## 2023-11-30 PROCEDURE — 83036 HEMOGLOBIN GLYCOSYLATED A1C: CPT

## 2023-11-30 PROCEDURE — 81001 URINALYSIS AUTO W/SCOPE: CPT

## 2023-11-30 PROCEDURE — 36415 COLL VENOUS BLD VENIPUNCTURE: CPT

## 2023-11-30 PROCEDURE — 80053 COMPREHEN METABOLIC PANEL: CPT

## 2023-12-05 ENCOUNTER — RESEARCH ENCOUNTER (OUTPATIENT)
Dept: OTHER | Facility: OTHER | Age: 73
End: 2023-12-05
Payer: MEDICARE

## 2023-12-05 NOTE — RESEARCH
Research study: [employer] PSR  Subject study ID#: [id number]  Subject initials: FELICIANO  Qualifying lead: [id number]      FELICIANO came to the [facility] offices on 9/20/23 to see Fatuma SANDS and he had a remote interrogation of his pacemaker on 9/20/23. I reviewed notes from both encounters as well as a general chart review to report data for the [employer] registry. His cardiac surgery events from June and new onset afib were reported; further details will be submitted if required per the registry query process. There were no device or lead-related adverse events to report. We will continue to follow FELICIANO per study protocol.      Cheri Richardson BSN RN CCRP  Clinical Research Coordinator

## 2024-01-16 RX ORDER — METOPROLOL SUCCINATE 25 MG/1
25 TABLET, EXTENDED RELEASE ORAL
Qty: 90 TABLET | Refills: 0 | Status: SHIPPED | OUTPATIENT
Start: 2024-01-16 | End: 2024-04-15

## 2024-01-18 RX ORDER — FUROSEMIDE 40 MG/1
40 TABLET ORAL DAILY
Qty: 90 TABLET | Refills: 1 | Status: SHIPPED | OUTPATIENT
Start: 2024-01-18

## 2024-01-30 ENCOUNTER — HOSPITAL ENCOUNTER (OUTPATIENT)
Dept: CT IMAGING | Facility: HOSPITAL | Age: 74
Discharge: HOME | End: 2024-01-30
Admitting: INTERNAL MEDICINE
Payer: MEDICARE

## 2024-01-30 DIAGNOSIS — I71.21 ANEURYSM OF ASCENDING AORTA WITHOUT RUPTURE: ICD-10-CM

## 2024-01-30 DIAGNOSIS — R91.1 PULMONARY NODULE: Primary | ICD-10-CM

## 2024-01-30 DIAGNOSIS — I35.0 NONRHEUMATIC AORTIC VALVE STENOSIS: ICD-10-CM

## 2024-01-30 LAB — CREAT BLDA-MCNC: 1 MG/DL (ref 0.6–1.3)

## 2024-01-30 PROCEDURE — 71275 CT ANGIOGRAPHY CHEST: CPT

## 2024-01-30 PROCEDURE — 82565 ASSAY OF CREATININE: CPT

## 2024-01-30 PROCEDURE — 25510000001 IOPAMIDOL PER 1 ML: Performed by: INTERNAL MEDICINE

## 2024-01-30 RX ADMIN — IOPAMIDOL 95 ML: 755 INJECTION, SOLUTION INTRAVENOUS at 11:54

## 2024-03-04 ENCOUNTER — TRANSCRIBE ORDERS (OUTPATIENT)
Dept: ADMINISTRATIVE | Facility: HOSPITAL | Age: 74
End: 2024-03-04
Payer: MEDICARE

## 2024-03-04 DIAGNOSIS — R91.8 LUNG NODULES: Primary | ICD-10-CM

## 2024-03-18 ENCOUNTER — HOSPITAL ENCOUNTER (OUTPATIENT)
Dept: PET IMAGING | Facility: HOSPITAL | Age: 74
Discharge: HOME | End: 2024-03-18
Payer: MEDICARE

## 2024-03-18 DIAGNOSIS — R91.8 LUNG NODULES: ICD-10-CM

## 2024-03-18 LAB — GLUCOSE BLDC GLUCOMTR-MCNC: 135 MG/DL (ref 70–130)

## 2024-03-18 PROCEDURE — 82948 REAGENT STRIP/BLOOD GLUCOSE: CPT

## 2024-03-18 PROCEDURE — 0 FLUDEOXYGLUCOSE F18 SOLUTION: Performed by: INTERNAL MEDICINE

## 2024-03-18 PROCEDURE — A9552 F18 FDG: HCPCS | Performed by: INTERNAL MEDICINE

## 2024-03-18 PROCEDURE — 78815 PET IMAGE W/CT SKULL-THIGH: CPT

## 2024-03-18 RX ADMIN — FLUDEOXYGLUCOSE F 18 1 DOSE: 200 INJECTION, SOLUTION INTRAVENOUS at 09:07

## 2024-03-22 ENCOUNTER — CLINICAL SUPPORT NO REQUIREMENTS (OUTPATIENT)
Age: 74
End: 2024-03-22
Payer: MEDICARE

## 2024-03-22 ENCOUNTER — TRANSCRIBE ORDERS (OUTPATIENT)
Dept: ADMINISTRATIVE | Facility: HOSPITAL | Age: 74
End: 2024-03-22
Payer: MEDICARE

## 2024-03-22 ENCOUNTER — OFFICE VISIT (OUTPATIENT)
Dept: CARDIOLOGY | Facility: CLINIC | Age: 74
End: 2024-03-22
Payer: MEDICARE

## 2024-03-22 ENCOUNTER — LAB (OUTPATIENT)
Dept: LAB | Facility: HOSPITAL | Age: 74
End: 2024-03-22
Payer: MEDICARE

## 2024-03-22 VITALS
DIASTOLIC BLOOD PRESSURE: 80 MMHG | WEIGHT: 193 LBS | HEART RATE: 66 BPM | BODY MASS INDEX: 28.58 KG/M2 | SYSTOLIC BLOOD PRESSURE: 148 MMHG | HEIGHT: 69 IN

## 2024-03-22 DIAGNOSIS — I10 PRIMARY HYPERTENSION: ICD-10-CM

## 2024-03-22 DIAGNOSIS — I44.2 AV BLOCK, COMPLETE: Primary | ICD-10-CM

## 2024-03-22 DIAGNOSIS — I25.10 ATHEROSCLEROSIS OF NATIVE CORONARY ARTERY OF NATIVE HEART WITHOUT ANGINA PECTORIS: ICD-10-CM

## 2024-03-22 DIAGNOSIS — Z95.2 S/P AVR: ICD-10-CM

## 2024-03-22 DIAGNOSIS — E78.5 HYPERLIPIDEMIA, UNSPECIFIED HYPERLIPIDEMIA TYPE: ICD-10-CM

## 2024-03-22 DIAGNOSIS — I45.10 RBBB: ICD-10-CM

## 2024-03-22 DIAGNOSIS — I25.810 CORONARY ARTERY DISEASE INVOLVING CORONARY BYPASS GRAFT OF NATIVE HEART WITHOUT ANGINA PECTORIS: Primary | ICD-10-CM

## 2024-03-22 DIAGNOSIS — I10 ESSENTIAL HYPERTENSION, BENIGN: ICD-10-CM

## 2024-03-22 DIAGNOSIS — E11.9 DIABETES MELLITUS WITHOUT COMPLICATION: ICD-10-CM

## 2024-03-22 DIAGNOSIS — I71.21 ANEURYSM OF ASCENDING AORTA WITHOUT RUPTURE: ICD-10-CM

## 2024-03-22 DIAGNOSIS — E11.8 TYPE II DIABETES MELLITUS WITH COMPLICATION: ICD-10-CM

## 2024-03-22 DIAGNOSIS — Z95.1 S/P CABG (CORONARY ARTERY BYPASS GRAFT): ICD-10-CM

## 2024-03-22 DIAGNOSIS — E78.5 HYPERLIPIDEMIA, UNSPECIFIED HYPERLIPIDEMIA TYPE: Primary | ICD-10-CM

## 2024-03-22 DIAGNOSIS — I35.0 NONRHEUMATIC AORTIC VALVE STENOSIS: ICD-10-CM

## 2024-03-22 DIAGNOSIS — Z95.0 HISTORY OF PERMANENT CARDIAC PACEMAKER PLACEMENT: ICD-10-CM

## 2024-03-22 LAB
ALBUMIN SERPL-MCNC: 4.1 G/DL (ref 3.5–5.2)
ALBUMIN/GLOB SERPL: 2 G/DL
ALP SERPL-CCNC: 100 U/L (ref 39–117)
ALT SERPL W P-5'-P-CCNC: 10 U/L (ref 1–41)
ANION GAP SERPL CALCULATED.3IONS-SCNC: 10 MMOL/L (ref 5–15)
AST SERPL-CCNC: 11 U/L (ref 1–40)
BILIRUB SERPL-MCNC: 0.5 MG/DL (ref 0–1.2)
BUN SERPL-MCNC: 21 MG/DL (ref 8–23)
BUN/CREAT SERPL: 22.6 (ref 7–25)
CALCIUM SPEC-SCNC: 9.2 MG/DL (ref 8.6–10.5)
CHLORIDE SERPL-SCNC: 105 MMOL/L (ref 98–107)
CHOLEST SERPL-MCNC: 130 MG/DL (ref 0–200)
CO2 SERPL-SCNC: 26 MMOL/L (ref 22–29)
CREAT SERPL-MCNC: 0.93 MG/DL (ref 0.76–1.27)
EGFRCR SERPLBLD CKD-EPI 2021: 85.6 ML/MIN/1.73
GLOBULIN UR ELPH-MCNC: 2.1 GM/DL
GLUCOSE SERPL-MCNC: 131 MG/DL (ref 65–99)
HBA1C MFR BLD: 7.1 % (ref 4.8–5.6)
HDLC SERPL-MCNC: 45 MG/DL (ref 40–60)
LDLC SERPL CALC-MCNC: 63 MG/DL (ref 0–100)
LDLC/HDLC SERPL: 1.34 {RATIO}
POTASSIUM SERPL-SCNC: 4 MMOL/L (ref 3.5–5.2)
PROT SERPL-MCNC: 6.2 G/DL (ref 6–8.5)
SODIUM SERPL-SCNC: 141 MMOL/L (ref 136–145)
TRIGL SERPL-MCNC: 124 MG/DL (ref 0–150)
VLDLC SERPL-MCNC: 22 MG/DL (ref 5–40)

## 2024-03-22 PROCEDURE — 83036 HEMOGLOBIN GLYCOSYLATED A1C: CPT

## 2024-03-22 PROCEDURE — 80053 COMPREHEN METABOLIC PANEL: CPT

## 2024-03-22 PROCEDURE — 36415 COLL VENOUS BLD VENIPUNCTURE: CPT

## 2024-03-22 PROCEDURE — 80061 LIPID PANEL: CPT

## 2024-03-22 RX ORDER — AMLODIPINE BESYLATE 10 MG/1
10 TABLET ORAL DAILY
COMMUNITY
Start: 2023-12-21

## 2024-03-22 RX ORDER — BUDESONIDE, GLYCOPYRROLATE, AND FORMOTEROL FUMARATE 160; 9; 4.8 UG/1; UG/1; UG/1
2 AEROSOL, METERED RESPIRATORY (INHALATION) 2 TIMES DAILY
COMMUNITY

## 2024-03-22 NOTE — PROGRESS NOTES
Date of Office Visit: 24  Encounter Provider: Loc An MD  Place of Service: [facility] CARDIOLOGY  Patient Name: Alfredo Toure  :1950  [id number]    HPI: Alfredo Toure is a 73 y.o. male he is here for follow-up in 2023 he had an aortic valve replacement and aneurysm repair and a two-vessel CABG as well as ligation of his left atrial appendage.  He is here for follow-up and doing pretty well no chest pain or shortness of breath no PND orthopnea edema syncope or palpitations.  His BP at home is better than what were getting here.  He does have a permanent pacemaker.  His follow-up echo which I reviewed in 2023 after his surgery showed a normal ejection fraction of 50 to 55% with a small inferior wall motion abnormality    Past Medical History:   Diagnosis Date    Abnormal ECG 01/15/2019    40 beats per minute    Aneurysm 2010    No. Change in 12 years    Aortic stenosis     Arrhythmia     2ND DEGREE HEART BLOCK    Arthritis     HANDS    Ascending aortic aneurysm     Asthma     Now using an inhaler    Bleeding disorder     Bruise easily, thin blood    Bradycardia     Cancer     Melanoma, removed    Chronic cough     ALLERGIES    Colon polyp     Coronary artery disease     Diabetes mellitus     Diverticulitis     GERD (gastroesophageal reflux disease)     Heart murmur     Hiatal hernia     History of melanoma     BACK    Hyperlipidemia     Hypertension     Mitral valve prolapse     Pacemaker     Peptic ulceration     Post-nasal drip     Shortness of breath on exertion        Past Surgical History:   Procedure Laterality Date    AORTIC VALVE REPAIR/REPLACEMENT  2023    ASCENDING AORTIC ANEURYSM REPAIR W/ MECHANICAL AORTIC VALVE REPLACEMENT N/A 2023    Procedure: STERNOTOMY;  AORTIC ANEURYSM REPAIR AORTIC VALVE REPLACEMENT; AMPUTATION OF LEFT ATRIAL APPENDAGE;  TRANSESOPHAGEAL ECHOCARDIOGRAM WITH ANESTHESIA; PRP;  Surgeon: Claude  MD Ky;  Location: [facility] CVOR;  Service: Cardiothoracic;  Laterality: N/A;    CARDIAC CATHETERIZATION      CARDIAC CATHETERIZATION N/A 05/22/2023    Procedure: Coronary angiography;  Surgeon: Loc An MD;  Location: [facility] CATH INVASIVE LOCATION;  Service: Cardiology;  Laterality: N/A;    CARDIAC CATHETERIZATION N/A 05/22/2023    Procedure: Left Heart Cath;  Surgeon: Loc An MD;  Location: [facility] CATH INVASIVE LOCATION;  Service: Cardiology;  Laterality: N/A;    CARDIAC ELECTROPHYSIOLOGY PROCEDURE Left 02/15/2019    Procedure: Pacemaker DC new- [employer];  Surgeon: Zbigniew Tam MD;  Location: [facility] CATH INVASIVE LOCATION;  Service: Cardiology    CATARACT EXTRACTION WITH INTRAOCULAR LENS IMPLANT Bilateral     CHOLECYSTECTOMY      COLONOSCOPY  2014    COLONOSCOPY N/A 04/26/2021    Procedure: COLONOSCOPY to cecum with cold polypectomy;  Surgeon: Zbigniew Washington MD;  Location: [facility] ENDOSCOPY;  Service: General;  Laterality: N/A;  Pre: h/x of polyps  Post: diverticulosis. polyp    CORONARY ARTERY BYPASS GRAFT N/A 06/14/2023    Procedure: STERNOTOMY; CORONARY ARTERY BYPASS X 3 WITH INTERNAL MAMMARY ARTERY GRAFT AND ENDOSCOPIC SAPHENOUS VEIN HARVEST FROM RIGHT LEG;  Surgeon: Ky Arce MD;  Location: [facility];  Service: Cardiothoracic;  Laterality: N/A;    ENDOSCOPY N/A 04/26/2021    Procedure: ESOPHAGOGASTRODUODENOSCOPY with biopsies;  Surgeon: Zbigniew Washington MD;  Location: [facility] ENDOSCOPY;  Service: General;  Laterality: N/A;  Pre: reflux  Post: mild gastritis, hiatal hernia    ERCP      GALLSTONES    INSERT / REPLACE / REMOVE PACEMAKER      [facility] has records    LUMBAR DISCECTOMY      VEIN SURGERY  06/14/2023       Social History     Socioeconomic History    Marital status:    Tobacco Use    Smoking status: Never    Smokeless tobacco: Never    Tobacco comments:     Daily caffeine daily   Vaping Use    Vaping status: Never Used   Substance and Sexual  Activity    Alcohol use: Yes     Comment: on occasion - monthly.   alcohol is not the problem    Drug use: No    Sexual activity: Not Currently     Partners: Female     Comment: ED       Family History   Problem Relation Age of Onset    Colon cancer Maternal Uncle     Asthma Sister     [place] Hyperthermia Neg Hx        Review of Systems   Constitutional: Negative for decreased appetite, fever, malaise/fatigue and weight loss.   HENT:  Negative for nosebleeds.    Eyes:  Negative for double vision.   Cardiovascular:  Negative for chest pain, claudication, cyanosis, dyspnea on exertion, irregular heartbeat, leg swelling, near-syncope, orthopnea, palpitations, paroxysmal nocturnal dyspnea and syncope.   Respiratory:  Negative for cough, hemoptysis and shortness of breath.    Hematologic/Lymphatic: Negative for bleeding problem.   Skin:  Negative for rash.   Musculoskeletal:  Negative for falls and myalgias.   Gastrointestinal:  Negative for hematochezia, jaundice, melena, nausea and vomiting.   Genitourinary:  Negative for hematuria.   Neurological:  Negative for dizziness and seizures.   Psychiatric/Behavioral:  Negative for altered mental status and memory loss.        No Known Allergies      Current Outpatient Medications:     amLODIPine (NORVASC) 10 MG tablet, Take 1 tablet by mouth Daily., Disp: , Rfl:     aspirin 81 MG EC tablet, Take 1 tablet by mouth Daily. PER MD OFFICE PT DOESN'T HAVE TO HOLD FOR SURGERY, Disp: , Rfl:     Budeson-Glycopyrrol-Formoterol (Breztri Aerosphere) 160-9-4.8 MCG/ACT aerosol inhaler, Inhale 2 puffs 2 (Two) Times a Day., Disp: , Rfl:     FARXIGA 10 MG tablet, Take 10 mg by mouth Daily., Disp: , Rfl: 3    finasteride (PROSCAR) 5 MG tablet, Take 1 tablet by mouth Daily., Disp: , Rfl:     furosemide (LASIX) 40 MG tablet, TAKE 1 TABLET BY MOUTH EVERY DAY, Disp: 90 tablet, Rfl: 1    metFORMIN (GLUCOPHAGE) 1000 MG tablet, Take 1 tablet by mouth 2 (Two) Times a Day., Disp: , Rfl: 3    metoprolol  "succinate XL (TOPROL-XL) 25 MG 24 hr tablet, Take 1 tablet by mouth Daily for 90 days., Disp: 90 tablet, Rfl: 0    omeprazole (PRILOSEC) 40 MG capsule, Take 1 capsule by mouth 2 (Two) Times a Day., Disp: 60 capsule, Rfl: 5    potassium chloride (K-DUR,KLOR-CON) 20 MEQ CR tablet, Take 1 tablet by mouth Daily., Disp: 90 tablet, Rfl: 3    rosuvastatin (CRESTOR) 20 MG tablet, Take 1 tablet by mouth Every Night., Disp: 30 tablet, Rfl: 3    valsartan (DIOVAN) 320 MG tablet, TAKE 1 TABLET BY MOUTH EVERY DAY, Disp: 90 tablet, Rfl: 1  No current facility-administered medications for this visit.    Facility-Administered Medications Ordered in Other Visits:     Chlorhexidine Gluconate Cloth 2 % pads 1 application, 1 application , Topical, Q12H PRN, Shefali Campuzano, APRN      Objective:     Vitals:    03/22/24 1100   BP: 148/80   Pulse: 66   Weight: 87.5 kg (193 lb)   Height: 175.3 cm (69\")     Body mass index is 28.5 kg/m².    Constitutional:       Appearance: Well-developed.   Eyes:      General: No scleral icterus.  HENT:      Head: Normocephalic.   Neck:      Thyroid: No thyromegaly.      Vascular: No JVD.      Lymphadenopathy: No cervical adenopathy.   Pulmonary:      Effort: Pulmonary effort is normal.      Breath sounds: Normal breath sounds. No wheezing. No rales.   Cardiovascular:      Normal rate. Regular rhythm.      No gallop.    Edema:     Peripheral edema absent.   Abdominal:      Palpations: Abdomen is soft.      Tenderness: There is no abdominal tenderness.   Musculoskeletal: Normal range of motion. Skin:     General: Skin is warm and dry.      Findings: No rash.   Neurological:      Mental Status: Alert and oriented to person, place, and time.         ECG 12 Lead    Date/Time: 3/22/2024 11:49 AM  Performed by: Loc An MD    Authorized by: Loc An MD  Comparison: compared with previous ECG   Similar to previous ECG  Rhythm: sinus rhythm and paced    Clinical impression: abnormal " EKG           Assessment:       Diagnosis Plan   1. Coronary artery disease involving coronary bypass graft of native heart without angina pectoris  Lipoprotein A (LPA)    Apolipoprotein B      2. Atherosclerosis of native coronary artery of native heart without angina pectoris  Lipoprotein A (LPA)    Apolipoprotein B      3. Nonrheumatic aortic valve stenosis        4. RBBB        5. Aneurysm of ascending aorta without rupture        6. S/P CABG (coronary artery bypass graft)        7. S/P AVR        8. History of permanent cardiac pacemaker placement        9. Type II diabetes mellitus with complication        10. Primary hypertension                 Plan:       He is doing well I think clinically valve sounds great his pacer check looks good today so that is great his blood pressure is better at home than what were getting here and his PCP is on that pretty aggressively which is great.  His sugars have been worse than they were before he was on Ozempic and for some reason that is not covered which is hard to believe except we [place] in the [place] or we have the screwed up insurance and its awful.  His Farxiga is expensive I told him about [facility] and in general I think he is doing really well we will have him come back and see us in a year sooner if he has trouble    No follow-ups on file.     As always, it has been a pleasure to participate in your patient's care.      Sincerely,       Loc An MD

## 2024-03-26 ENCOUNTER — TRANSCRIBE ORDERS (OUTPATIENT)
Dept: ADMINISTRATIVE | Facility: HOSPITAL | Age: 74
End: 2024-03-26
Payer: MEDICARE

## 2024-03-26 DIAGNOSIS — R91.1 LUNG NODULE: Primary | ICD-10-CM

## 2024-04-08 RX ORDER — METOPROLOL SUCCINATE 25 MG/1
25 TABLET, EXTENDED RELEASE ORAL DAILY
Qty: 90 TABLET | Refills: 0 | Status: SHIPPED | OUTPATIENT
Start: 2024-04-08

## 2024-04-16 NOTE — RESEARCH
Research study: [employer] PSR  Subject study ID#: [id number]  Subject initials: FELICIANO  Qualifying lead: [id number] His bundle    I reviewed FELICIANO's medical record today up to the point of his most recent remote transmission report for his  pacemaker on May 3, 2021.  I reported data in accordance with requirements for the [employer] registry. Data reporting was routine; there were no qualifying adverse health events or device/lead adverse events to report. [employer] had recently logged a query regarding a transition in/out of MRI Surescan mode that had occured in April 2021. Because the patient is not due for an office visit until September, I called him today around 1300 to confirm the MRI date (April 16, 2021), purpose (preparation for prostate biopsy), and performing facility ([facility]). FELICIANO told me that a [employer] rep had been present that day to adjust his settings before and after the MRI. We will continue to follow FELICIANO per study protocol.     Cheri MALDONADO RN  Research Coordinator   Writer spoke with patient. She would like to use [facility] [address] in [place]. Informed patient she would take this for 3 months and may take up to 6 to start seeing improvement. She verbalized understanding and had no questions. Pharmacy queued.

## 2024-05-23 RX ORDER — METOPROLOL SUCCINATE 25 MG/1
25 TABLET, EXTENDED RELEASE ORAL DAILY
Qty: 90 TABLET | Refills: 3 | Status: SHIPPED | OUTPATIENT
Start: 2024-05-23

## 2024-05-23 RX ORDER — VALSARTAN 320 MG/1
320 TABLET ORAL DAILY
Qty: 90 TABLET | Refills: 1 | Status: SHIPPED | OUTPATIENT
Start: 2024-05-23

## 2024-06-05 ENCOUNTER — HOSPITAL ENCOUNTER (OUTPATIENT)
Dept: CT IMAGING | Facility: HOSPITAL | Age: 74
Discharge: HOME | End: 2024-06-05
Admitting: INTERNAL MEDICINE
Payer: MEDICARE

## 2024-06-05 DIAGNOSIS — R91.1 LUNG NODULE: ICD-10-CM

## 2024-06-05 PROCEDURE — 71250 CT THORAX DX C-: CPT

## 2024-06-13 ENCOUNTER — TELEPHONE (OUTPATIENT)
Dept: CARDIAC SURGERY | Facility: CLINIC | Age: 74
End: 2024-06-13
Payer: MEDICARE

## 2024-06-25 ENCOUNTER — TELEPHONE (OUTPATIENT)
Age: 74
End: 2024-06-25

## 2024-06-25 NOTE — TELEPHONE ENCOUNTER
Pt with [employer] DDD pacer and routine remote received 6/25/24      2 NST-VT events listed. Longest lasting 4/23/24 for 21 beats avg V rate 170 bpm. Event occurred at 02:55 AM likely during sleeping hrs    I called and LVM to see if pt was symptomatic, this appears to be first NST-VT recorded on device.    Lead trends stable and no other events recorded.    He has next yearly device check and f/u with Fatuma Lyles APRN 3/26/25

## 2024-06-26 ENCOUNTER — LAB (OUTPATIENT)
Dept: LAB | Facility: HOSPITAL | Age: 74
End: 2024-06-26
Payer: MEDICARE

## 2024-06-26 ENCOUNTER — TRANSCRIBE ORDERS (OUTPATIENT)
Dept: ADMINISTRATIVE | Facility: HOSPITAL | Age: 74
End: 2024-06-26
Payer: MEDICARE

## 2024-06-26 DIAGNOSIS — E11.9 DIABETES MELLITUS WITHOUT COMPLICATION: ICD-10-CM

## 2024-06-26 DIAGNOSIS — E78.5 HYPERLIPIDEMIA, UNSPECIFIED HYPERLIPIDEMIA TYPE: ICD-10-CM

## 2024-06-26 DIAGNOSIS — R91.1 LUNG NODULE: ICD-10-CM

## 2024-06-26 DIAGNOSIS — E11.9 DIABETES MELLITUS WITHOUT COMPLICATION: Primary | ICD-10-CM

## 2024-06-26 LAB
ALBUMIN SERPL-MCNC: 4.1 G/DL (ref 3.5–5.2)
ALBUMIN/GLOB SERPL: 1.9 G/DL
ALP SERPL-CCNC: 91 U/L (ref 39–117)
ALT SERPL W P-5'-P-CCNC: 15 U/L (ref 1–41)
ANION GAP SERPL CALCULATED.3IONS-SCNC: 8 MMOL/L (ref 5–15)
AST SERPL-CCNC: 14 U/L (ref 1–40)
BILIRUB SERPL-MCNC: 0.5 MG/DL (ref 0–1.2)
BUN SERPL-MCNC: 20 MG/DL (ref 8–23)
BUN/CREAT SERPL: 19 (ref 7–25)
CALCIUM SPEC-SCNC: 9.4 MG/DL (ref 8.6–10.5)
CHLORIDE SERPL-SCNC: 104 MMOL/L (ref 98–107)
CHOLEST SERPL-MCNC: 120 MG/DL (ref 0–200)
CO2 SERPL-SCNC: 31 MMOL/L (ref 22–29)
CREAT SERPL-MCNC: 1.05 MG/DL (ref 0.76–1.27)
EGFRCR SERPLBLD CKD-EPI 2021: 74 ML/MIN/1.73
GLOBULIN UR ELPH-MCNC: 2.2 GM/DL
GLUCOSE SERPL-MCNC: 149 MG/DL (ref 65–99)
HBA1C MFR BLD: 7.3 % (ref 4.8–5.6)
HDLC SERPL-MCNC: 46 MG/DL (ref 40–60)
LDLC SERPL CALC-MCNC: 49 MG/DL (ref 0–100)
LDLC/HDLC SERPL: 0.97 {RATIO}
POTASSIUM SERPL-SCNC: 4.2 MMOL/L (ref 3.5–5.2)
PROT SERPL-MCNC: 6.3 G/DL (ref 6–8.5)
SODIUM SERPL-SCNC: 143 MMOL/L (ref 136–145)
TRIGL SERPL-MCNC: 148 MG/DL (ref 0–150)
VLDLC SERPL-MCNC: 25 MG/DL (ref 5–40)

## 2024-06-26 PROCEDURE — 80061 LIPID PANEL: CPT

## 2024-06-26 PROCEDURE — 36415 COLL VENOUS BLD VENIPUNCTURE: CPT

## 2024-06-26 PROCEDURE — 80053 COMPREHEN METABOLIC PANEL: CPT

## 2024-06-26 PROCEDURE — 83036 HEMOGLOBIN GLYCOSYLATED A1C: CPT

## 2024-07-02 RX ORDER — POTASSIUM CHLORIDE 1500 MG/1
20 TABLET, EXTENDED RELEASE ORAL DAILY
Qty: 90 TABLET | Refills: 3 | Status: SHIPPED | OUTPATIENT
Start: 2024-07-02

## 2024-07-02 RX ORDER — FUROSEMIDE 40 MG/1
40 TABLET ORAL DAILY
Qty: 90 TABLET | Refills: 1 | Status: SHIPPED | OUTPATIENT
Start: 2024-07-02

## 2024-08-13 ENCOUNTER — OFFICE VISIT (OUTPATIENT)
Dept: CARDIAC SURGERY | Facility: CLINIC | Age: 74
End: 2024-08-13
Payer: MEDICARE

## 2024-08-13 VITALS
SYSTOLIC BLOOD PRESSURE: 129 MMHG | TEMPERATURE: 97.7 F | OXYGEN SATURATION: 95 % | HEART RATE: 74 BPM | DIASTOLIC BLOOD PRESSURE: 77 MMHG | HEIGHT: 69 IN | WEIGHT: 190 LBS | RESPIRATION RATE: 20 BRPM | BODY MASS INDEX: 28.14 KG/M2

## 2024-08-13 DIAGNOSIS — Z98.890 S/P AORTIC ANEURYSM REPAIR: Primary | ICD-10-CM

## 2024-08-13 DIAGNOSIS — Z95.1 S/P CABG (CORONARY ARTERY BYPASS GRAFT): ICD-10-CM

## 2024-08-13 DIAGNOSIS — Z95.2 S/P AVR: ICD-10-CM

## 2024-08-13 DIAGNOSIS — Z86.79 S/P AORTIC ANEURYSM REPAIR: Primary | ICD-10-CM

## 2024-08-13 DIAGNOSIS — I25.10 CORONARY ARTERY DISEASE INVOLVING NATIVE CORONARY ARTERY OF NATIVE HEART WITHOUT ANGINA PECTORIS: ICD-10-CM

## 2024-08-13 DIAGNOSIS — I35.0 NONRHEUMATIC AORTIC VALVE STENOSIS: ICD-10-CM

## 2024-08-13 DIAGNOSIS — I71.21 ANEURYSM OF ASCENDING AORTA WITHOUT RUPTURE: ICD-10-CM

## 2024-08-13 PROCEDURE — 1159F MED LIST DOCD IN RCRD: CPT | Performed by: NURSE PRACTITIONER

## 2024-08-13 PROCEDURE — 1160F RVW MEDS BY RX/DR IN RCRD: CPT | Performed by: NURSE PRACTITIONER

## 2024-08-13 PROCEDURE — 3078F DIAST BP <80 MM HG: CPT | Performed by: NURSE PRACTITIONER

## 2024-08-13 PROCEDURE — 99214 OFFICE O/P EST MOD 30 MIN: CPT | Performed by: NURSE PRACTITIONER

## 2024-08-13 PROCEDURE — 3074F SYST BP LT 130 MM HG: CPT | Performed by: NURSE PRACTITIONER

## 2024-08-13 NOTE — PROGRESS NOTES
8/13/2024      Subjective:      Frankie Goncalves MD    Chief Complaint: Follow-up ascending aortic aneurysm/severe AS, s/p ascending aortic aneurysm repair/tissue AVR    History of Present Illness:       Dear Frankie Zuluaga MD and Colleagues,    It was nice to see Alfredo Toure in [facility] for follow-up. He is a 73 y.o. male with 4.9 cm ascending aortic aneurysm, severe bicuspid aortic stenosis, CAD, HTN, HLD, DM type II, pulmonary nodules followed by Dr. De Perez, hx melanoma with resection, and PPM placement per Dr. Tam.  He subsequently underwent elective ascending aortic aneurysm repair with a 28 mm graft, tissue AVR (25 mm magna), elective CABG x 3 with a LIMA to mid LAD, SVG to lateral marginal, and SVG to diagonal, and left atrial appendage amputation per Dr. Arce on 6/14/2023.  He denies any change in his health history since last being seen.  He comes in today for routine follow-up for aneurysm surveillence.  The CT of chest without contrast on 6/5/2024 was reviewed.  The aortic root measures 3.4 cm, ascending aorta measures 3.4 x 3 cm, and DTA measures 2.6-2.7 cm.  There are multiple follow lateral pulmonary nodules as well as a 2.8 cm right thyroid lobe nodule.  He had a PET scan in March 2024 that showed no suspicious metabolic uptake.  Dr. Perez had recommended referral to endocrinology but patient declined.  He has not had an echo since surgery.  He was last seen by Dr. An in March 2024.  He continues to have a dry hacky cough that is believed to be r/t post-nasal drip.  He has seen allergy medicine and pulmonary.  Nothing really improves the cough.  He denies shortness of breath, chest/back pain, numbness/tingling/pain of extremities.  No vascular deficiencies or hyperextensible or hypermobile joints are noted on exam.  The patient's family history is negative for aneurysms or dissections, negative for connective tissue disease, and negative for coronary disease in first  degree family members.  His sister and her children have aortic valve issues and have undergone TAVR procedure.  His BP today is 129/77. He is with his wife for the appt today.        Patient Active Problem List   Diagnosis    Second degree heart block    Other fatigue    RBBB    Dyspnea on exertion    Symptomatic bradycardia    History of permanent cardiac pacemaker placement    Nonrheumatic aortic valve stenosis    Ascending aortic aneurysm    Type II diabetes mellitus with complication    Essential hypertension    Coronary artery disease involving native coronary artery of native heart without angina pectoris    S/P CABG (coronary artery bypass graft)    S/P AVR       Past Medical History:   Diagnosis Date    Abnormal ECG 01/15/2019    40 beats per minute    Aneurysm 01/01/2010    No. Change in 12 years    Aortic stenosis     Arrhythmia     2ND DEGREE HEART BLOCK    Arthritis     HANDS    Ascending aortic aneurysm     Asthma     Now using an inhaler    Bleeding disorder     Bruise easily, thin blood    Bradycardia     Cancer     Melanoma, removed    Chronic cough     ALLERGIES    Colon polyp     Coronary artery disease 01\01\21    Diabetes mellitus     Diverticulitis     GERD (gastroesophageal reflux disease)     Heart murmur     Hiatal hernia     History of melanoma     BACK    Hyperlipidemia     Hypertension     Mitral valve prolapse     Pacemaker     Peptic ulceration     Post-nasal drip     Shortness of breath on exertion        Past Surgical History:   Procedure Laterality Date    AORTIC VALVE REPAIR/REPLACEMENT  06/14/2023    ASCENDING AORTIC ANEURYSM REPAIR W/ MECHANICAL AORTIC VALVE REPLACEMENT N/A 06/14/2023    Procedure: STERNOTOMY;  AORTIC ANEURYSM REPAIR AORTIC VALVE REPLACEMENT; AMPUTATION OF LEFT ATRIAL APPENDAGE;  TRANSESOPHAGEAL ECHOCARDIOGRAM WITH ANESTHESIA; PRP;  Surgeon: Ky Arce MD;  Location: [facility];  Service: Cardiothoracic;  Laterality: N/A;    CARDIAC CATHETERIZATION       CARDIAC CATHETERIZATION N/A 05/22/2023    Procedure: Coronary angiography;  Surgeon: Loc An MD;  Location: [facility] CATH INVASIVE LOCATION;  Service: Cardiology;  Laterality: N/A;    CARDIAC CATHETERIZATION N/A 05/22/2023    Procedure: Left Heart Cath;  Surgeon: Loc An MD;  Location: [facility] CATH INVASIVE LOCATION;  Service: Cardiology;  Laterality: N/A;    CARDIAC ELECTROPHYSIOLOGY PROCEDURE Left 02/15/2019    Procedure: Pacemaker DC new- [employer];  Surgeon: Zbigniew Tam MD;  Location: [facility] CATH INVASIVE LOCATION;  Service: Cardiology    CATARACT EXTRACTION WITH INTRAOCULAR LENS IMPLANT Bilateral     CHOLECYSTECTOMY      COLONOSCOPY  2014    COLONOSCOPY N/A 04/26/2021    Procedure: COLONOSCOPY to cecum with cold polypectomy;  Surgeon: Zbigniew Washington MD;  Location: [facility] ENDOSCOPY;  Service: General;  Laterality: N/A;  Pre: h/x of polyps  Post: diverticulosis. polyp    CORONARY ARTERY BYPASS GRAFT N/A 06/14/2023    Procedure: STERNOTOMY; CORONARY ARTERY BYPASS X 3 WITH INTERNAL MAMMARY ARTERY GRAFT AND ENDOSCOPIC SAPHENOUS VEIN HARVEST FROM RIGHT LEG;  Surgeon: Ky Arce MD;  Location: [facility] CVOR;  Service: Cardiothoracic;  Laterality: N/A;    ENDOSCOPY N/A 04/26/2021    Procedure: ESOPHAGOGASTRODUODENOSCOPY with biopsies;  Surgeon: Zbigniew Washington MD;  Location: [facility] ENDOSCOPY;  Service: General;  Laterality: N/A;  Pre: reflux  Post: mild gastritis, hiatal hernia    ERCP      GALLSTONES    INSERT / REPLACE / REMOVE PACEMAKER      [facility] has records    LUMBAR DISCECTOMY      VEIN SURGERY  06/14/2023       No Known Allergies      Current Outpatient Medications:     amLODIPine (NORVASC) 10 MG tablet, Take 1 tablet by mouth Daily., Disp: , Rfl:     aspirin 81 MG EC tablet, Take 1 tablet by mouth Daily. PER MD OFFICE PT DOESN'T HAVE TO HOLD FOR SURGERY, Disp: , Rfl:     Budeson-Glycopyrrol-Formoterol (Breztri Aerosphere) 160-9-4.8 MCG/ACT aerosol inhaler, Inhale 2 puffs  2 (Two) Times a Day., Disp: , Rfl:     FARXIGA 10 MG tablet, Take 10 mg by mouth Daily., Disp: , Rfl: 3    finasteride (PROSCAR) 5 MG tablet, Take 1 tablet by mouth Daily., Disp: , Rfl:     furosemide (LASIX) 40 MG tablet, TAKE 1 TABLET BY MOUTH EVERY DAY, Disp: 90 tablet, Rfl: 1    Klor-Con M20 20 MEQ CR tablet, TAKE 1 TABLET BY MOUTH EVERY DAY, Disp: 90 tablet, Rfl: 3    metFORMIN (GLUCOPHAGE) 1000 MG tablet, Take 1 tablet by mouth 2 (Two) Times a Day., Disp: , Rfl: 3    metoprolol succinate XL (TOPROL-XL) 25 MG 24 hr tablet, Take 1 tablet by mouth Daily., Disp: 90 tablet, Rfl: 3    omeprazole (PRILOSEC) 40 MG capsule, Take 1 capsule by mouth 2 (Two) Times a Day., Disp: 60 capsule, Rfl: 5    rosuvastatin (CRESTOR) 20 MG tablet, Take 1 tablet by mouth Every Night., Disp: 30 tablet, Rfl: 3    valsartan (DIOVAN) 320 MG tablet, Take 1 tablet by mouth Daily., Disp: 90 tablet, Rfl: 1  No current facility-administered medications for this visit.    Social History     Socioeconomic History    Marital status:    Tobacco Use    Smoking status: Never    Smokeless tobacco: Never    Tobacco comments:     Daily caffeine daily   Vaping Use    Vaping status: Never Used   Substance and Sexual Activity    Alcohol use: Yes     Comment: on occasion - monthly.   alcohol is not the problem    Drug use: No    Sexual activity: Not Currently     Partners: Female     Comment: ED       Family History   Problem Relation Age of Onset    Colon cancer Maternal Uncle     Asthma Sister     Malig Hyperthermia Neg Hx            Review of Systems:  Review of Systems   HENT:  Positive for postnasal drip.    Respiratory:  Positive for cough. Negative for shortness of breath.    Cardiovascular:  Negative for chest pain, palpitations and leg swelling.   Neurological:  Negative for dizziness and light-headedness.       Physical Exam:    Vital Signs:  Weight: 86.2 kg (190 lb)   Body mass index is 28.06 kg/m².  Temp: 97.7 °F (36.5 °C)   Heart Rate:  74   BP: 129/77     Physical Exam  Vitals and nursing note reviewed.   Constitutional:       General: He is awake.      Appearance: Normal appearance. He is well-developed and well-groomed.      Comments: Wife present   HENT:      Head: Normocephalic and atraumatic.      Nose: Nose normal.      Mouth/Throat:      Lips: Pink.      Mouth: Mucous membranes are moist.      Pharynx: Uvula midline.   Eyes:      General: Lids are normal. No scleral icterus.     Extraocular Movements: Extraocular movements intact.      Conjunctiva/sclera: Conjunctivae normal.      Pupils: Pupils are equal, round, and reactive to light.   Neck:      Thyroid: No thyroid mass or thyromegaly.      Vascular: Normal carotid pulses. No carotid bruit, hepatojugular reflux or JVD.      Trachea: Trachea normal.   Cardiovascular:      Rate and Rhythm: Normal rate and regular rhythm.      Pulses:           Carotid pulses are 2+ on the right side and 2+ on the left side.       Radial pulses are 2+ on the right side and 2+ on the left side.        Femoral pulses are 2+ on the right side and 2+ on the left side.       Popliteal pulses are 2+ on the right side and 2+ on the left side.        Dorsalis pedis pulses are 2+ on the right side and 2+ on the left side.        Posterior tibial pulses are 2+ on the right side and 2+ on the left side.      Heart sounds: Normal heart sounds. No murmur heard.  Pulmonary:      Effort: Pulmonary effort is normal.      Breath sounds: Normal breath sounds.   Abdominal:      General: Abdomen is protuberant. Bowel sounds are normal. There is no distension.      Palpations: Abdomen is soft.      Tenderness: There is no abdominal tenderness.   Musculoskeletal:      Cervical back: Neck supple.      Right lower leg: No edema.      Left lower leg: No edema.      Comments: Gait steady and strong without use of assistive devices   Lymphadenopathy:      Cervical: No cervical adenopathy.      Upper Body:      Right upper body: No  supraclavicular adenopathy.      Left upper body: No supraclavicular adenopathy.   Skin:     General: Skin is warm and dry.      Capillary Refill: Capillary refill takes less than 2 seconds.      Findings: No erythema or rash.      Nails: There is no clubbing.             Comments: Sternotomy/ SVHS well healed.   Neurological:      Mental Status: He is alert and oriented to person, place, and time.      GCS: GCS eye subscore is 4. GCS verbal subscore is 5. GCS motor subscore is 6.   Psychiatric:         Attention and Perception: Attention and perception normal.         Mood and Affect: Mood and affect normal.         Speech: Speech normal.         Behavior: Behavior normal. Behavior is cooperative.         Thought Content: Thought content normal.         Cognition and Memory: Cognition and memory normal.         Judgment: Judgment normal.          Assessment:     Ascending aortic aneurysm, 4.9 cm with severe bicuspid aortic stenosis and aortopathy--elective ascending aortic aneurysm repair with a 28 mm graft, tissue AVR (25 mm magna), elective CABG x 3 with a LIMA to mid LAD, SVG to lateral marginal, and SVG to diagonal, and [facility]  MV CAD--s/p CABG  Family history of aortic valve disease--requiring TAVR  HTN--well-controlled  HLD--statin  Multiple bilateral pulmonary nodules--followed by Dr. De Perez    Recommendation/Plan:       Continued risk factor modification of hypertension with a goal blood pressure less than 130/80, hyperlipidemia optimization, and continued avoidance of tobacco/nicotine products.    We discussed the importance of avoidance of over the counter decongestants and stimulants, specifically pseudoephedrine, for hypertension and aneurysm management.    Initiation of low aerobic exercise is indicated to help reduce body habitus, assist with blood pressure and cholesterol control.       Although risk of rupture is low, emergency department  presentation is warranted for acute chest, back, or abdominal pain, syncope, or stroke like symptoms.    Follow up in [facility] in 1 year(s) with CT scan of chest without contrast.  His aortic measurements are stable.  His pulmonary nodules are followed by Dr. Perez.  We discussed the importance of his children letting their care team know about the family history of aortic valve disease and aortic aneurysm.  He is also aware of endocarditis prophylaxis with invasive procedures including dental procedures.    I spent approximately 30 minutes total in evaluating the data, examining the patient, discussing the options and counseling.  Independent interpretation of imaging.  Imaging shown to pt/wife.     Thank you for allowing us to participate in his care.    Regards,    Milagro Ponce, APRN      **All problems and history are new to this examiner.  Extensive review of records prior to and during patient visit

## 2024-08-22 RX ORDER — METOPROLOL SUCCINATE 25 MG/1
25 TABLET, EXTENDED RELEASE ORAL DAILY
Qty: 90 TABLET | Refills: 3 | Status: SHIPPED | OUTPATIENT
Start: 2024-08-22

## 2024-09-26 ENCOUNTER — LAB (OUTPATIENT)
Dept: LAB | Facility: HOSPITAL | Age: 74
End: 2024-09-26
Payer: MEDICARE

## 2024-09-26 ENCOUNTER — TRANSCRIBE ORDERS (OUTPATIENT)
Dept: ADMINISTRATIVE | Facility: HOSPITAL | Age: 74
End: 2024-09-26
Payer: MEDICARE

## 2024-09-26 DIAGNOSIS — E78.5 HYPERLIPIDEMIA, UNSPECIFIED HYPERLIPIDEMIA TYPE: ICD-10-CM

## 2024-09-26 DIAGNOSIS — Z00.00 ROUTINE GENERAL MEDICAL EXAMINATION AT A HEALTH CARE FACILITY: Primary | ICD-10-CM

## 2024-09-26 DIAGNOSIS — Z00.00 ROUTINE GENERAL MEDICAL EXAMINATION AT A HEALTH CARE FACILITY: ICD-10-CM

## 2024-09-26 DIAGNOSIS — I10 ESSENTIAL HYPERTENSION, BENIGN: ICD-10-CM

## 2024-09-26 DIAGNOSIS — E11.9 DIABETES MELLITUS WITHOUT COMPLICATION: ICD-10-CM

## 2024-09-26 LAB
ALBUMIN SERPL-MCNC: 3.9 G/DL (ref 3.5–5.2)
ALBUMIN/GLOB SERPL: 1.8 G/DL
ALP SERPL-CCNC: 78 U/L (ref 39–117)
ALT SERPL W P-5'-P-CCNC: 13 U/L (ref 1–41)
ANION GAP SERPL CALCULATED.3IONS-SCNC: 8.7 MMOL/L (ref 5–15)
AST SERPL-CCNC: 13 U/L (ref 1–40)
BACTERIA UR QL AUTO: NORMAL /HPF
BASOPHILS # BLD AUTO: 0.05 10*3/MM3 (ref 0–0.2)
BASOPHILS NFR BLD AUTO: 0.7 % (ref 0–1.5)
BILIRUB SERPL-MCNC: 0.5 MG/DL (ref 0–1.2)
BILIRUB UR QL STRIP: NEGATIVE
BUN SERPL-MCNC: 18 MG/DL (ref 8–23)
BUN/CREAT SERPL: 16.1 (ref 7–25)
CALCIUM SPEC-SCNC: 9.7 MG/DL (ref 8.6–10.5)
CHLORIDE SERPL-SCNC: 103 MMOL/L (ref 98–107)
CHOLEST SERPL-MCNC: 99 MG/DL (ref 0–200)
CLARITY UR: CLEAR
CO2 SERPL-SCNC: 28.3 MMOL/L (ref 22–29)
COLOR UR: YELLOW
CREAT SERPL-MCNC: 1.12 MG/DL (ref 0.76–1.27)
DEPRECATED RDW RBC AUTO: 43.8 FL (ref 37–54)
EGFRCR SERPLBLD CKD-EPI 2021: 68.1 ML/MIN/1.73
EOSINOPHIL # BLD AUTO: 0.19 10*3/MM3 (ref 0–0.4)
EOSINOPHIL NFR BLD AUTO: 2.7 % (ref 0.3–6.2)
ERYTHROCYTE [DISTWIDTH] IN BLOOD BY AUTOMATED COUNT: 13.5 % (ref 12.3–15.4)
GLOBULIN UR ELPH-MCNC: 2.2 GM/DL
GLUCOSE SERPL-MCNC: 127 MG/DL (ref 65–99)
GLUCOSE UR STRIP-MCNC: ABNORMAL MG/DL
HBA1C MFR BLD: 6.2 % (ref 4.8–5.6)
HCT VFR BLD AUTO: 40.6 % (ref 37.5–51)
HDLC SERPL-MCNC: 38 MG/DL (ref 40–60)
HGB BLD-MCNC: 13.1 G/DL (ref 13–17.7)
HGB UR QL STRIP.AUTO: NEGATIVE
HYALINE CASTS UR QL AUTO: NORMAL /LPF
IMM GRANULOCYTES # BLD AUTO: 0.05 10*3/MM3 (ref 0–0.05)
IMM GRANULOCYTES NFR BLD AUTO: 0.7 % (ref 0–0.5)
KETONES UR QL STRIP: NEGATIVE
LDLC SERPL CALC-MCNC: 36 MG/DL (ref 0–100)
LDLC/HDLC SERPL: 0.83 {RATIO}
LEUKOCYTE ESTERASE UR QL STRIP.AUTO: NEGATIVE
LYMPHOCYTES # BLD AUTO: 1.34 10*3/MM3 (ref 0.7–3.1)
LYMPHOCYTES NFR BLD AUTO: 18.8 % (ref 19.6–45.3)
MCH RBC QN AUTO: 28.6 PG (ref 26.6–33)
MCHC RBC AUTO-ENTMCNC: 32.3 G/DL (ref 31.5–35.7)
MCV RBC AUTO: 88.6 FL (ref 79–97)
MONOCYTES # BLD AUTO: 0.6 10*3/MM3 (ref 0.1–0.9)
MONOCYTES NFR BLD AUTO: 8.4 % (ref 5–12)
NEUTROPHILS NFR BLD AUTO: 4.88 10*3/MM3 (ref 1.7–7)
NEUTROPHILS NFR BLD AUTO: 68.7 % (ref 42.7–76)
NITRITE UR QL STRIP: NEGATIVE
NRBC BLD AUTO-RTO: 0 /100 WBC (ref 0–0.2)
PH UR STRIP.AUTO: 6 [PH] (ref 5–8)
PLATELET # BLD AUTO: 229 10*3/MM3 (ref 140–450)
PMV BLD AUTO: 9.6 FL (ref 6–12)
POTASSIUM SERPL-SCNC: 4.4 MMOL/L (ref 3.5–5.2)
PROT SERPL-MCNC: 6.1 G/DL (ref 6–8.5)
PROT UR QL STRIP: ABNORMAL
RBC # BLD AUTO: 4.58 10*6/MM3 (ref 4.14–5.8)
RBC # UR STRIP: NORMAL /HPF
REF LAB TEST METHOD: NORMAL
SODIUM SERPL-SCNC: 140 MMOL/L (ref 136–145)
SP GR UR STRIP: >1.03 (ref 1–1.03)
SQUAMOUS #/AREA URNS HPF: NORMAL /HPF
TRIGL SERPL-MCNC: 148 MG/DL (ref 0–150)
UROBILINOGEN UR QL STRIP: ABNORMAL
VLDLC SERPL-MCNC: 25 MG/DL (ref 5–40)
WBC # UR STRIP: NORMAL /HPF
WBC NRBC COR # BLD AUTO: 7.11 10*3/MM3 (ref 3.4–10.8)

## 2024-09-26 PROCEDURE — 81001 URINALYSIS AUTO W/SCOPE: CPT

## 2024-09-26 PROCEDURE — 80053 COMPREHEN METABOLIC PANEL: CPT

## 2024-09-26 PROCEDURE — 85025 COMPLETE CBC W/AUTO DIFF WBC: CPT

## 2024-09-26 PROCEDURE — 83036 HEMOGLOBIN GLYCOSYLATED A1C: CPT

## 2024-09-26 PROCEDURE — 80061 LIPID PANEL: CPT

## 2024-09-26 PROCEDURE — 36415 COLL VENOUS BLD VENIPUNCTURE: CPT

## 2024-10-24 RX ORDER — FUROSEMIDE 40 MG
40 TABLET ORAL DAILY
Qty: 90 TABLET | Refills: 1 | Status: SHIPPED | OUTPATIENT
Start: 2024-10-24

## 2024-11-12 ENCOUNTER — TRANSCRIBE ORDERS (OUTPATIENT)
Dept: ADMINISTRATIVE | Facility: HOSPITAL | Age: 74
End: 2024-11-12
Payer: MEDICARE

## 2024-11-12 DIAGNOSIS — R91.1 LUNG NODULE: Primary | ICD-10-CM

## 2024-12-27 ENCOUNTER — TRANSCRIBE ORDERS (OUTPATIENT)
Dept: ADMINISTRATIVE | Facility: HOSPITAL | Age: 74
End: 2024-12-27
Payer: MEDICARE

## 2024-12-27 ENCOUNTER — LAB (OUTPATIENT)
Dept: LAB | Facility: HOSPITAL | Age: 74
End: 2024-12-27
Payer: MEDICARE

## 2024-12-27 DIAGNOSIS — I10 ESSENTIAL HYPERTENSION, BENIGN: ICD-10-CM

## 2024-12-27 DIAGNOSIS — E78.5 HYPERLIPIDEMIA, UNSPECIFIED HYPERLIPIDEMIA TYPE: Primary | ICD-10-CM

## 2024-12-27 DIAGNOSIS — E11.9 DIABETES MELLITUS WITHOUT COMPLICATION: ICD-10-CM

## 2024-12-27 DIAGNOSIS — E78.5 HYPERLIPIDEMIA, UNSPECIFIED HYPERLIPIDEMIA TYPE: ICD-10-CM

## 2024-12-27 LAB
ALBUMIN SERPL-MCNC: 3.4 G/DL (ref 3.5–5.2)
ALBUMIN/GLOB SERPL: 1.1 G/DL
ALP SERPL-CCNC: 80 U/L (ref 39–117)
ALT SERPL W P-5'-P-CCNC: 5 U/L (ref 1–41)
ANION GAP SERPL CALCULATED.3IONS-SCNC: 11.6 MMOL/L (ref 5–15)
AST SERPL-CCNC: 14 U/L (ref 1–40)
BILIRUB SERPL-MCNC: 0.5 MG/DL (ref 0–1.2)
BUN SERPL-MCNC: 18 MG/DL (ref 8–23)
BUN/CREAT SERPL: 17.1 (ref 7–25)
CALCIUM SPEC-SCNC: 9.5 MG/DL (ref 8.6–10.5)
CHLORIDE SERPL-SCNC: 99 MMOL/L (ref 98–107)
CHOLEST SERPL-MCNC: 136 MG/DL (ref 0–200)
CO2 SERPL-SCNC: 27.4 MMOL/L (ref 22–29)
CREAT SERPL-MCNC: 1.05 MG/DL (ref 0.76–1.27)
EGFRCR SERPLBLD CKD-EPI 2021: 73.6 ML/MIN/1.73
GLOBULIN UR ELPH-MCNC: 3.1 GM/DL
GLUCOSE SERPL-MCNC: 122 MG/DL (ref 65–99)
HBA1C MFR BLD: 6.3 % (ref 4.8–5.6)
HDLC SERPL-MCNC: 48 MG/DL (ref 40–60)
LDLC SERPL CALC-MCNC: 60 MG/DL (ref 0–100)
LDLC/HDLC SERPL: 1.13 {RATIO}
POTASSIUM SERPL-SCNC: 4.4 MMOL/L (ref 3.5–5.2)
PROT SERPL-MCNC: 6.5 G/DL (ref 6–8.5)
SODIUM SERPL-SCNC: 138 MMOL/L (ref 136–145)
TRIGL SERPL-MCNC: 170 MG/DL (ref 0–150)
VLDLC SERPL-MCNC: 28 MG/DL (ref 5–40)

## 2024-12-27 PROCEDURE — 36415 COLL VENOUS BLD VENIPUNCTURE: CPT

## 2024-12-27 PROCEDURE — 80053 COMPREHEN METABOLIC PANEL: CPT

## 2024-12-27 PROCEDURE — 83036 HEMOGLOBIN GLYCOSYLATED A1C: CPT

## 2024-12-27 PROCEDURE — 80061 LIPID PANEL: CPT

## 2025-03-26 ENCOUNTER — CLINICAL SUPPORT NO REQUIREMENTS (OUTPATIENT)
Age: 75
End: 2025-03-26
Payer: MEDICARE

## 2025-03-26 ENCOUNTER — HOSPITAL ENCOUNTER (OUTPATIENT)
Dept: CARDIOLOGY | Facility: HOSPITAL | Age: 75
Discharge: HOME | End: 2025-03-26
Admitting: NURSE PRACTITIONER
Payer: MEDICARE

## 2025-03-26 ENCOUNTER — TELEPHONE (OUTPATIENT)
Age: 75
End: 2025-03-26

## 2025-03-26 ENCOUNTER — RESULTS FOLLOW-UP (OUTPATIENT)
Dept: CARDIOLOGY | Facility: HOSPITAL | Age: 75
End: 2025-03-26
Payer: MEDICARE

## 2025-03-26 ENCOUNTER — OFFICE VISIT (OUTPATIENT)
Dept: CARDIOLOGY | Age: 75
End: 2025-03-26
Payer: MEDICARE

## 2025-03-26 VITALS
SYSTOLIC BLOOD PRESSURE: 86 MMHG | HEIGHT: 69 IN | DIASTOLIC BLOOD PRESSURE: 60 MMHG | WEIGHT: 156 LBS | BODY MASS INDEX: 23.11 KG/M2 | HEART RATE: 87 BPM

## 2025-03-26 DIAGNOSIS — R53.83 OTHER FATIGUE: Primary | ICD-10-CM

## 2025-03-26 DIAGNOSIS — I35.0 NONRHEUMATIC AORTIC VALVE STENOSIS: ICD-10-CM

## 2025-03-26 DIAGNOSIS — Z95.0 HISTORY OF PERMANENT CARDIAC PACEMAKER PLACEMENT: ICD-10-CM

## 2025-03-26 DIAGNOSIS — Z98.890 S/P ASCENDING AORTIC ANEURYSM REPAIR: ICD-10-CM

## 2025-03-26 DIAGNOSIS — Z95.2 S/P AVR: ICD-10-CM

## 2025-03-26 DIAGNOSIS — I25.10 CORONARY ARTERY DISEASE INVOLVING NATIVE CORONARY ARTERY OF NATIVE HEART WITHOUT ANGINA PECTORIS: Primary | ICD-10-CM

## 2025-03-26 DIAGNOSIS — I10 ESSENTIAL HYPERTENSION: ICD-10-CM

## 2025-03-26 DIAGNOSIS — I95.89 OTHER SPECIFIED HYPOTENSION: ICD-10-CM

## 2025-03-26 DIAGNOSIS — R00.1 SYMPTOMATIC BRADYCARDIA: Primary | ICD-10-CM

## 2025-03-26 DIAGNOSIS — I71.21 ANEURYSM OF ASCENDING AORTA WITHOUT RUPTURE: ICD-10-CM

## 2025-03-26 DIAGNOSIS — R06.09 DYSPNEA ON EXERTION: ICD-10-CM

## 2025-03-26 DIAGNOSIS — I44.2 AV BLOCK, COMPLETE: Primary | ICD-10-CM

## 2025-03-26 DIAGNOSIS — Z95.1 S/P CABG (CORONARY ARTERY BYPASS GRAFT): ICD-10-CM

## 2025-03-26 DIAGNOSIS — Z86.79 S/P ASCENDING AORTIC ANEURYSM REPAIR: ICD-10-CM

## 2025-03-26 LAB
ALBUMIN SERPL-MCNC: 3.8 G/DL (ref 3.5–5.2)
ALBUMIN/GLOB SERPL: 1.3 G/DL
ALP SERPL-CCNC: 85 U/L (ref 39–117)
ALT SERPL W P-5'-P-CCNC: 7 U/L (ref 1–41)
ANION GAP SERPL CALCULATED.3IONS-SCNC: 20.4 MMOL/L (ref 5–15)
AST SERPL-CCNC: 13 U/L (ref 1–40)
BILIRUB SERPL-MCNC: 0.5 MG/DL (ref 0–1.2)
BUN SERPL-MCNC: 26 MG/DL (ref 8–23)
BUN/CREAT SERPL: 15.1 (ref 7–25)
CALCIUM SPEC-SCNC: 9.6 MG/DL (ref 8.6–10.5)
CHLORIDE SERPL-SCNC: 98 MMOL/L (ref 98–107)
CO2 SERPL-SCNC: 20.6 MMOL/L (ref 22–29)
CREAT SERPL-MCNC: 1.72 MG/DL (ref 0.76–1.27)
DEPRECATED RDW RBC AUTO: 45.8 FL (ref 37–54)
EGFRCR SERPLBLD CKD-EPI 2021: 40.7 ML/MIN/1.73
ERYTHROCYTE [DISTWIDTH] IN BLOOD BY AUTOMATED COUNT: 14.4 % (ref 12.3–15.4)
GLOBULIN UR ELPH-MCNC: 2.9 GM/DL
GLUCOSE SERPL-MCNC: 101 MG/DL (ref 65–99)
HCT VFR BLD AUTO: 39.5 % (ref 37.5–51)
HGB BLD-MCNC: 13.6 G/DL (ref 13–17.7)
MCH RBC QN AUTO: 30 PG (ref 26.6–33)
MCHC RBC AUTO-ENTMCNC: 34.4 G/DL (ref 31.5–35.7)
MCV RBC AUTO: 87.2 FL (ref 79–97)
NT-PROBNP SERPL-MCNC: 717 PG/ML (ref 0–1800)
PLATELET # BLD AUTO: 246 10*3/MM3 (ref 140–450)
PMV BLD AUTO: 9.8 FL (ref 6–12)
POTASSIUM SERPL-SCNC: 4 MMOL/L (ref 3.5–5.2)
PROT SERPL-MCNC: 6.7 G/DL (ref 6–8.5)
RBC # BLD AUTO: 4.53 10*6/MM3 (ref 4.14–5.8)
SODIUM SERPL-SCNC: 139 MMOL/L (ref 136–145)
TSH SERPL DL<=0.05 MIU/L-ACNC: 0.69 UIU/ML (ref 0.27–4.2)
WBC NRBC COR # BLD AUTO: 7.73 10*3/MM3 (ref 3.4–10.8)

## 2025-03-26 PROCEDURE — 1160F RVW MEDS BY RX/DR IN RCRD: CPT | Performed by: NURSE PRACTITIONER

## 2025-03-26 PROCEDURE — 80053 COMPREHEN METABOLIC PANEL: CPT | Performed by: NURSE PRACTITIONER

## 2025-03-26 PROCEDURE — 3078F DIAST BP <80 MM HG: CPT | Performed by: NURSE PRACTITIONER

## 2025-03-26 PROCEDURE — 25810000003 SODIUM CHLORIDE 0.9 % SOLUTION: Performed by: NURSE PRACTITIONER

## 2025-03-26 PROCEDURE — 85027 COMPLETE CBC AUTOMATED: CPT | Performed by: NURSE PRACTITIONER

## 2025-03-26 PROCEDURE — 3074F SYST BP LT 130 MM HG: CPT | Performed by: NURSE PRACTITIONER

## 2025-03-26 PROCEDURE — 36415 COLL VENOUS BLD VENIPUNCTURE: CPT

## 2025-03-26 PROCEDURE — 96365 THER/PROPH/DIAG IV INF INIT: CPT

## 2025-03-26 PROCEDURE — 83880 ASSAY OF NATRIURETIC PEPTIDE: CPT | Performed by: NURSE PRACTITIONER

## 2025-03-26 PROCEDURE — 93000 ELECTROCARDIOGRAM COMPLETE: CPT | Performed by: NURSE PRACTITIONER

## 2025-03-26 PROCEDURE — 1159F MED LIST DOCD IN RCRD: CPT | Performed by: NURSE PRACTITIONER

## 2025-03-26 PROCEDURE — 84443 ASSAY THYROID STIM HORMONE: CPT | Performed by: NURSE PRACTITIONER

## 2025-03-26 PROCEDURE — 99214 OFFICE O/P EST MOD 30 MIN: CPT | Performed by: NURSE PRACTITIONER

## 2025-03-26 RX ORDER — SODIUM CHLORIDE 0.9 % (FLUSH) 0.9 %
10 SYRINGE (ML) INJECTION AS NEEDED
Status: DISCONTINUED | OUTPATIENT
Start: 2025-03-26 | End: 2025-03-27 | Stop reason: HOSPADM

## 2025-03-26 RX ORDER — SODIUM CHLORIDE 0.9 % (FLUSH) 0.9 %
10 SYRINGE (ML) INJECTION EVERY 12 HOURS SCHEDULED
Status: DISCONTINUED | OUTPATIENT
Start: 2025-03-26 | End: 2025-03-27 | Stop reason: HOSPADM

## 2025-03-26 RX ORDER — SEMAGLUTIDE 1.34 MG/ML
0.5 INJECTION, SOLUTION SUBCUTANEOUS WEEKLY
COMMUNITY

## 2025-03-26 RX ORDER — SODIUM CHLORIDE 9 MG/ML
500 INJECTION, SOLUTION INTRAVENOUS ONCE
Status: COMPLETED | OUTPATIENT
Start: 2025-03-26 | End: 2025-03-26

## 2025-03-26 RX ADMIN — SODIUM CHLORIDE 500 ML/HR: 9 INJECTION, SOLUTION INTRAVENOUS at 11:53

## 2025-03-26 NOTE — TELEPHONE ENCOUNTER
The pt was in office today for a device check of his MDT DC pacemaker. The device shows that he went into RRT (recommended replacement time) a month ago on 2/28/25. He is dependent and RV paces 100% of the time. I have included his device details below. His RV lead is HIS placed.   Kindly,   Jannet Castro Device ESTEVAN

## 2025-03-26 NOTE — TELEPHONE ENCOUNTER
I left him a message regarding his lab results.  In addition to the changes made during his appointment, I also recommended discontinuing the Lasix.

## 2025-03-26 NOTE — PROGRESS NOTES
Date of Office Visit: 2025  Encounter Provider: WICHO Lipscomb  Place of Service: [facility] CARDIOLOGY  Patient Name: Alfredo Toure  :1950    Chief Complaint   Patient presents with    Coronary Artery Disease   :     HPI: Alfredo Toure is a 74 y.o. male patient of Dr. An's with a history of a bicuspid aortic valve, thoracic aortic aneurysm, AV block (status post permanent pacemaker), and coronary artery disease.      In , he underwent a CABG x 3 (LIMA to the mid LAD, reverse individual saphenous vein graft to the lateral marginal and diagonal), ascending aortic aneurysm repair, aortic valve replacement, and left atrial appendage ligation.    He was last seen in the office by Dr. An in 2024 at which time he was doing well.  No changes were made to his regimen, and he was advised to follow-up in 1 year.    Unfortunately he has been diagnosed with metastatic prostate cancer.  He has been on Relugolix and enzalutamide since 2024.  He has been following with the [facility].  He is being evaluated for radiation therapy and surgery.  It sounds like it is a trial.  He's not really excited about it.  Has an appointment with colorectal surgery next week.  Since his diagnosis and initiation of the chemotherapy drugs, he has lost about 40 pounds.  This is mainly due to lack of appetite.  He has also been having diarrhea at least twice a day for the last month.  He is symptomatic with lightheadedness and weakness.  He reports shortness of breath at rest that is worse with exertion.  No PND, orthopnea, or edema.  He denies any chest pain, palpitations, or syncope.    Past Medical History:   Diagnosis Date    Abnormal ECG 01/15/2019    40 beats per minute    Aneurysm 2010    No. Change in 12 years    Aortic stenosis     Arrhythmia     2ND DEGREE HEART BLOCK    Arthritis     HANDS    Ascending aortic aneurysm     Asthma     Now using  an inhaler    Bleeding disorder     Bruise easily, thin blood    Bradycardia     Cancer     Melanoma, removed    Chronic cough     ALLERGIES    Colon polyp     Coronary artery disease 01\01\21    Diabetes mellitus     Diverticulitis     GERD (gastroesophageal reflux disease)     Heart murmur     Hiatal hernia     History of melanoma     BACK    Hyperlipidemia     Hypertension     Mitral valve prolapse     Pacemaker     Peptic ulceration     Post-nasal drip     Shortness of breath on exertion        Past Surgical History:   Procedure Laterality Date    AORTIC VALVE REPAIR/REPLACEMENT  06/14/2023    ASCENDING AORTIC ANEURYSM REPAIR W/ MECHANICAL AORTIC VALVE REPLACEMENT N/A 06/14/2023    Procedure: STERNOTOMY;  AORTIC ANEURYSM REPAIR AORTIC VALVE REPLACEMENT; AMPUTATION OF LEFT ATRIAL APPENDAGE;  TRANSESOPHAGEAL ECHOCARDIOGRAM WITH ANESTHESIA; PRP;  Surgeon: Ky Arce MD;  Location: [facility] CVOR;  Service: Cardiothoracic;  Laterality: N/A;    CARDIAC CATHETERIZATION      CARDIAC CATHETERIZATION N/A 05/22/2023    Procedure: Coronary angiography;  Surgeon: Loc An MD;  Location: [facility] CATH INVASIVE LOCATION;  Service: Cardiology;  Laterality: N/A;    CARDIAC CATHETERIZATION N/A 05/22/2023    Procedure: Left Heart Cath;  Surgeon: Loc An MD;  Location: [facility] CATH INVASIVE LOCATION;  Service: Cardiology;  Laterality: N/A;    CARDIAC ELECTROPHYSIOLOGY PROCEDURE Left 02/15/2019    Procedure: Pacemaker DC new- [employer];  Surgeon: Zbigniew Tam MD;  Location: [facility] CATH INVASIVE LOCATION;  Service: Cardiology    CATARACT EXTRACTION WITH INTRAOCULAR LENS IMPLANT Bilateral     CHOLECYSTECTOMY      COLONOSCOPY  2014    COLONOSCOPY N/A 04/26/2021    Procedure: COLONOSCOPY to cecum with cold polypectomy;  Surgeon: Zbigniew Washington MD;  Location: [facility] ENDOSCOPY;  Service: General;  Laterality: N/A;  Pre: h/x of polyps  Post: diverticulosis. polyp    CORONARY ARTERY BYPASS GRAFT N/A 06/14/2023     Procedure: STERNOTOMY; CORONARY ARTERY BYPASS X 3 WITH INTERNAL MAMMARY ARTERY GRAFT AND ENDOSCOPIC SAPHENOUS VEIN HARVEST FROM RIGHT LEG;  Surgeon: Ky Arce MD;  Location: [facility] CVOR;  Service: Cardiothoracic;  Laterality: N/A;    ENDOSCOPY N/A 04/26/2021    Procedure: ESOPHAGOGASTRODUODENOSCOPY with biopsies;  Surgeon: Zbigniew Washington MD;  Location: [facility] ENDOSCOPY;  Service: General;  Laterality: N/A;  Pre: reflux  Post: mild gastritis, hiatal hernia    ERCP      GALLSTONES    INSERT / REPLACE / REMOVE PACEMAKER      [facility] has records    LUMBAR DISCECTOMY      VEIN SURGERY  06/14/2023       Social History     Socioeconomic History    Marital status:    Tobacco Use    Smoking status: Never    Smokeless tobacco: Never    Tobacco comments:     Daily caffeine daily   Vaping Use    Vaping status: Never Used   Substance and Sexual Activity    Alcohol use: Yes     Comment: on occasion - monthly.   alcohol is not the problem    Drug use: No    Sexual activity: Not Currently     Partners: Female     Comment: ED       Family History   Problem Relation Age of Onset    Colon cancer Maternal Uncle     Asthma Sister     Malig Hyperthermia Neg Hx        Review of Systems   Constitutional: Positive for weight loss.   Cardiovascular:  Positive for dyspnea on exertion. Negative for chest pain, leg swelling, orthopnea, paroxysmal nocturnal dyspnea and syncope.   Respiratory:  Positive for shortness of breath.    Hematologic/Lymphatic: Negative for bleeding problem.   Musculoskeletal:  Negative for falls.   Gastrointestinal:  Positive for diarrhea. Negative for melena.   Neurological:  Positive for light-headedness and weakness. Negative for dizziness.       No Known Allergies      Current Outpatient Medications:     aspirin 81 MG EC tablet, Take 1 tablet by mouth Daily. PER MD OFFICE PT DOESN'T HAVE TO HOLD FOR SURGERY, Disp: , Rfl:     enzalutamide (XTANDI) 40 MG tablet tablet, Take 2 tablets by mouth  "Daily., Disp: , Rfl:     FARXIGA 10 MG tablet, Take 10 mg by mouth Daily., Disp: , Rfl: 3    finasteride (PROSCAR) 5 MG tablet, Take 1 tablet by mouth Daily., Disp: , Rfl:     furosemide (LASIX) 40 MG tablet, Take 1 tablet by mouth Daily., Disp: 90 tablet, Rfl: 1    Klor-Con M20 20 MEQ CR tablet, TAKE 1 TABLET BY MOUTH EVERY DAY, Disp: 90 tablet, Rfl: 3    omeprazole (PRILOSEC) 40 MG capsule, Take 1 capsule by mouth 2 (Two) Times a Day., Disp: 60 capsule, Rfl: 5    relugolix (ORGOVYX) 120 MG tablet tablet, Take 1 tablet by mouth Daily., Disp: , Rfl:     Semaglutide,0.25 or 0.5MG/DOS, (Ozempic, 0.25 or 0.5 MG/DOSE,) 2 MG/1.5ML solution pen-injector, Inject 0.5 mg under the skin into the appropriate area as directed 1 (One) Time Per Week., Disp: , Rfl:   No current facility-administered medications for this visit.    Facility-Administered Medications Ordered in Other Visits:     sodium chloride 0.9 % flush 10 mL, 10 mL, Intravenous, Q12H, Fatuma Lyles APRN    sodium chloride 0.9 % flush 10 mL, 10 mL, Intravenous, PRN, Fatuma Lyles APRN      Objective:     Vitals:    03/26/25 1054   BP: (!) 86/60   Pulse: 87   Weight: 70.8 kg (156 lb)   Height: 175.3 cm (69\")     Body mass index is 23.04 kg/m².    PHYSICAL EXAM:    Neck:      Vascular: No JVD.   Pulmonary:      Effort: Pulmonary effort is normal.      Breath sounds: Normal breath sounds.   Cardiovascular:      Normal rate. Regular rhythm.      Murmurs: There is no murmur.      No gallop.  No click. No rub.   Pulses:     Intact distal pulses.           ECG 12 Lead    Date/Time: 3/26/2025 11:02 AM  Performed by: Fatuma Lyles APRN    Authorized by: Fatuma Lyles APRN  Comparison: compared with previous ECG from 3/22/2024  Similar to previous ECG  Rhythm: paced  Rate: normal  BPM: 87  Pacing: atrial sensed rhythm and ventricular paced rhythm          Assessment:       Diagnosis Plan   1. Coronary artery disease involving native coronary " artery of native heart without angina pectoris  ECG 12 Lead      2. S/P CABG (coronary artery bypass graft)        3. Nonrheumatic aortic valve stenosis        4. S/P AVR        5. Aneurysm of ascending aorta without rupture        6. S/P ascending aortic aneurysm repair        7. History of permanent cardiac pacemaker placement        8. Essential hypertension        9. Dyspnea on exertion  CBC (No Diff)    Comprehensive Metabolic Panel    proBNP    TSH        Orders Placed This Encounter   Procedures    CBC (No Diff)     Standing Status:   Future     Number of Occurrences:   1     Expected Date:   3/31/2025     Expiration Date:   3/26/2026     Release to patient:   Routine Release [[id number]]    Comprehensive Metabolic Panel     Standing Status:   Future     Number of Occurrences:   1     Expected Date:   3/31/2025     Expiration Date:   3/26/2026     Release to patient:   Routine Release [[id number]]    proBNP     Standing Status:   Future     Number of Occurrences:   1     Expected Date:   3/31/2025     Expiration Date:   3/26/2026     Release to patient:   Routine Release [[id number]]    TSH     Standing Status:   Future     Number of Occurrences:   1     Expected Date:   3/31/2025     Expiration Date:   3/26/2026     Release to patient:   Routine Release [[id number]]    ECG 12 Lead     This order was created via procedure documentation     Release to patient:   Routine Release [[id number]]          Plan:       1.  Coronary artery disease.  He denies any angina.  Continue aspirin.       2.  Aortic stenosis.  Status post surgical AVR.      3.  Ascending aortic aneurysm.  Status post surgical repair.      4.  AV block.  Status post permanent pacemaker.  He had his device checked today.  He reached RRT on 2/28 and will be scheduled for replacement.      5.  Hypertension.  His blood pressure is low.  I suspect he is overmedicated.  Amlodipine was already discontinued.  Recommended holding valsartan and Toprol.  He  feels pretty terrible.  We are going to give him IV fluids today in the [facility].      6.  Hyperlipidemia.  Lipid panel from December demonstrated an LDL of 16 and HDL of 48.  He has been on rosuvastatin.      7.  Dyspnea on exertion.  Likely multifactorial.  I recommended checking labs today.      He looks and feels terrible, likely related to the cancer drugs and hypotension.  I discussed with Dr. An.  He agreed with holding the blood pressure medications.  In addition, we are recommending he hold the rosuvastatin, metformin, and Ozempic.  I am also going to call his primary care doctor.  We will see what his labs look like and how he responds to the fluids.      As always, it has been a pleasure to participate in your patient's care.      Sincerely,         WICHO Araujo

## 2025-04-07 ENCOUNTER — LAB (OUTPATIENT)
Dept: LAB | Facility: HOSPITAL | Age: 75
End: 2025-04-07
Payer: MEDICARE

## 2025-04-07 ENCOUNTER — TRANSCRIBE ORDERS (OUTPATIENT)
Dept: ADMINISTRATIVE | Facility: HOSPITAL | Age: 75
End: 2025-04-07
Payer: MEDICARE

## 2025-04-07 DIAGNOSIS — G62.9 NEUROPATHY ASSOCIATED WITH BENIGN MONOCLONAL GAMMOPATHY: ICD-10-CM

## 2025-04-07 DIAGNOSIS — E03.9 HYPOTHYROIDISM, UNSPECIFIED TYPE: ICD-10-CM

## 2025-04-07 DIAGNOSIS — D47.2 NEUROPATHY ASSOCIATED WITH BENIGN MONOCLONAL GAMMOPATHY: ICD-10-CM

## 2025-04-07 DIAGNOSIS — E03.9 HYPOTHYROIDISM, UNSPECIFIED TYPE: Primary | ICD-10-CM

## 2025-04-07 DIAGNOSIS — R00.1 SYMPTOMATIC BRADYCARDIA: ICD-10-CM

## 2025-04-07 LAB
ANION GAP SERPL CALCULATED.3IONS-SCNC: 8.9 MMOL/L (ref 5–15)
BUN SERPL-MCNC: 19 MG/DL (ref 8–23)
BUN/CREAT SERPL: 17.3 (ref 7–25)
CALCIUM SPEC-SCNC: 9.7 MG/DL (ref 8.6–10.5)
CHLORIDE SERPL-SCNC: 105 MMOL/L (ref 98–107)
CHROMATIN AB SERPL-ACNC: 10.4 IU/ML (ref 0–14)
CO2 SERPL-SCNC: 27.1 MMOL/L (ref 22–29)
CREAT SERPL-MCNC: 1.1 MG/DL (ref 0.76–1.27)
DEPRECATED RDW RBC AUTO: 44.9 FL (ref 37–54)
EGFRCR SERPLBLD CKD-EPI 2021: 69.6 ML/MIN/1.73
ERYTHROCYTE [DISTWIDTH] IN BLOOD BY AUTOMATED COUNT: 14.4 % (ref 12.3–15.4)
ERYTHROCYTE [SEDIMENTATION RATE] IN BLOOD: 12 MM/HR (ref 0–20)
GLUCOSE SERPL-MCNC: 97 MG/DL (ref 65–99)
HCT VFR BLD AUTO: 30.3 % (ref 37.5–51)
HGB BLD-MCNC: 10.6 G/DL (ref 13–17.7)
MCH RBC QN AUTO: 30.2 PG (ref 26.6–33)
MCHC RBC AUTO-ENTMCNC: 35 G/DL (ref 31.5–35.7)
MCV RBC AUTO: 86.3 FL (ref 79–97)
PLATELET # BLD AUTO: 232 10*3/MM3 (ref 140–450)
PMV BLD AUTO: 9.1 FL (ref 6–12)
POTASSIUM SERPL-SCNC: 4.1 MMOL/L (ref 3.5–5.2)
RBC # BLD AUTO: 3.51 10*6/MM3 (ref 4.14–5.8)
SODIUM SERPL-SCNC: 141 MMOL/L (ref 136–145)
VIT B12 BLD-MCNC: 253 PG/ML (ref 211–946)
WBC NRBC COR # BLD AUTO: 4.66 10*3/MM3 (ref 3.4–10.8)

## 2025-04-07 PROCEDURE — 86431 RHEUMATOID FACTOR QUANT: CPT

## 2025-04-07 PROCEDURE — 85652 RBC SED RATE AUTOMATED: CPT

## 2025-04-07 PROCEDURE — 85027 COMPLETE CBC AUTOMATED: CPT

## 2025-04-07 PROCEDURE — 82607 VITAMIN B-12: CPT

## 2025-04-07 PROCEDURE — 80048 BASIC METABOLIC PNL TOTAL CA: CPT

## 2025-04-07 PROCEDURE — 84207 ASSAY OF VITAMIN B-6: CPT

## 2025-04-07 PROCEDURE — 36415 COLL VENOUS BLD VENIPUNCTURE: CPT

## 2025-04-07 PROCEDURE — 86618 LYME DISEASE ANTIBODY: CPT

## 2025-04-08 LAB — B BURGDOR IGG+IGM SER QL IA: NEGATIVE

## 2025-04-10 LAB — PYRIDOXAL PHOS SERPL-MCNC: 4.2 UG/L (ref 3.4–65.2)

## 2025-04-15 ENCOUNTER — LAB (OUTPATIENT)
Dept: LAB | Facility: HOSPITAL | Age: 75
End: 2025-04-15
Payer: MEDICARE

## 2025-04-15 ENCOUNTER — OFFICE VISIT (OUTPATIENT)
Dept: CARDIOLOGY | Age: 75
End: 2025-04-15
Payer: MEDICARE

## 2025-04-15 ENCOUNTER — TELEPHONE (OUTPATIENT)
Dept: CARDIOLOGY | Age: 75
End: 2025-04-15
Payer: MEDICARE

## 2025-04-15 VITALS
WEIGHT: 177 LBS | BODY MASS INDEX: 26.22 KG/M2 | DIASTOLIC BLOOD PRESSURE: 84 MMHG | SYSTOLIC BLOOD PRESSURE: 160 MMHG | HEART RATE: 65 BPM | HEIGHT: 69 IN

## 2025-04-15 DIAGNOSIS — R60.0 BILATERAL LOWER EXTREMITY EDEMA: ICD-10-CM

## 2025-04-15 DIAGNOSIS — R06.09 DYSPNEA ON EXERTION: Primary | ICD-10-CM

## 2025-04-15 DIAGNOSIS — R06.09 DYSPNEA ON EXERTION: ICD-10-CM

## 2025-04-15 DIAGNOSIS — I10 ESSENTIAL HYPERTENSION: ICD-10-CM

## 2025-04-15 DIAGNOSIS — I35.0 NONRHEUMATIC AORTIC VALVE STENOSIS: ICD-10-CM

## 2025-04-15 DIAGNOSIS — Z95.2 S/P AVR: ICD-10-CM

## 2025-04-15 LAB
ALBUMIN SERPL-MCNC: 3.1 G/DL (ref 3.5–5.2)
ALBUMIN/GLOB SERPL: 1.3 G/DL
ALP SERPL-CCNC: 73 U/L (ref 39–117)
ALT SERPL W P-5'-P-CCNC: 5 U/L (ref 1–41)
ANION GAP SERPL CALCULATED.3IONS-SCNC: 8.2 MMOL/L (ref 5–15)
AST SERPL-CCNC: 9 U/L (ref 1–40)
BACTERIA UR QL AUTO: NORMAL /HPF
BILIRUB SERPL-MCNC: 0.4 MG/DL (ref 0–1.2)
BILIRUB UR QL STRIP: NEGATIVE
BUN SERPL-MCNC: 13 MG/DL (ref 8–23)
BUN/CREAT SERPL: 11.2 (ref 7–25)
CALCIUM SPEC-SCNC: 9.1 MG/DL (ref 8.6–10.5)
CHLORIDE SERPL-SCNC: 107 MMOL/L (ref 98–107)
CLARITY UR: CLEAR
CO2 SERPL-SCNC: 26.8 MMOL/L (ref 22–29)
COLOR UR: YELLOW
CREAT SERPL-MCNC: 1.16 MG/DL (ref 0.76–1.27)
DEPRECATED RDW RBC AUTO: 48.1 FL (ref 37–54)
EGFRCR SERPLBLD CKD-EPI 2021: 65.3 ML/MIN/1.73
ERYTHROCYTE [DISTWIDTH] IN BLOOD BY AUTOMATED COUNT: 15.1 % (ref 12.3–15.4)
GLOBULIN UR ELPH-MCNC: 2.4 GM/DL
GLUCOSE SERPL-MCNC: 175 MG/DL (ref 65–99)
GLUCOSE UR STRIP-MCNC: ABNORMAL MG/DL
HCT VFR BLD AUTO: 29.7 % (ref 37.5–51)
HGB BLD-MCNC: 10.5 G/DL (ref 13–17.7)
HGB UR QL STRIP.AUTO: NEGATIVE
HYALINE CASTS UR QL AUTO: NORMAL /LPF
KETONES UR QL STRIP: NEGATIVE
LEUKOCYTE ESTERASE UR QL STRIP.AUTO: NEGATIVE
MCH RBC QN AUTO: 31.2 PG (ref 26.6–33)
MCHC RBC AUTO-ENTMCNC: 35.4 G/DL (ref 31.5–35.7)
MCV RBC AUTO: 88.1 FL (ref 79–97)
NITRITE UR QL STRIP: NEGATIVE
NT-PROBNP SERPL-MCNC: 4772 PG/ML (ref 0–1800)
PH UR STRIP.AUTO: <=5 [PH] (ref 5–8)
PLATELET # BLD AUTO: 220 10*3/MM3 (ref 140–450)
PMV BLD AUTO: 9.1 FL (ref 6–12)
POTASSIUM SERPL-SCNC: 4.2 MMOL/L (ref 3.5–5.2)
PROT SERPL-MCNC: 5.5 G/DL (ref 6–8.5)
PROT UR QL STRIP: NEGATIVE
RBC # BLD AUTO: 3.37 10*6/MM3 (ref 4.14–5.8)
RBC # UR STRIP: NORMAL /HPF
REF LAB TEST METHOD: NORMAL
SODIUM SERPL-SCNC: 142 MMOL/L (ref 136–145)
SP GR UR STRIP: 1.02 (ref 1–1.03)
SQUAMOUS #/AREA URNS HPF: NORMAL /HPF
TSH SERPL DL<=0.05 MIU/L-ACNC: 1.08 UIU/ML (ref 0.27–4.2)
UROBILINOGEN UR QL STRIP: ABNORMAL
WBC # UR STRIP: NORMAL /HPF
WBC NRBC COR # BLD AUTO: 4.06 10*3/MM3 (ref 3.4–10.8)

## 2025-04-15 PROCEDURE — 99214 OFFICE O/P EST MOD 30 MIN: CPT | Performed by: NURSE PRACTITIONER

## 2025-04-15 PROCEDURE — 1160F RVW MEDS BY RX/DR IN RCRD: CPT | Performed by: NURSE PRACTITIONER

## 2025-04-15 PROCEDURE — 3079F DIAST BP 80-89 MM HG: CPT | Performed by: NURSE PRACTITIONER

## 2025-04-15 PROCEDURE — 80053 COMPREHEN METABOLIC PANEL: CPT

## 2025-04-15 PROCEDURE — 81001 URINALYSIS AUTO W/SCOPE: CPT

## 2025-04-15 PROCEDURE — 36415 COLL VENOUS BLD VENIPUNCTURE: CPT

## 2025-04-15 PROCEDURE — 1159F MED LIST DOCD IN RCRD: CPT | Performed by: NURSE PRACTITIONER

## 2025-04-15 PROCEDURE — 93000 ELECTROCARDIOGRAM COMPLETE: CPT | Performed by: NURSE PRACTITIONER

## 2025-04-15 PROCEDURE — 84443 ASSAY THYROID STIM HORMONE: CPT

## 2025-04-15 PROCEDURE — 85027 COMPLETE CBC AUTOMATED: CPT

## 2025-04-15 PROCEDURE — 83880 ASSAY OF NATRIURETIC PEPTIDE: CPT

## 2025-04-15 PROCEDURE — 3077F SYST BP >= 140 MM HG: CPT | Performed by: NURSE PRACTITIONER

## 2025-04-15 RX ORDER — VALSARTAN 160 MG/1
160 TABLET ORAL DAILY
Qty: 30 TABLET | Refills: 2 | Status: SHIPPED | OUTPATIENT
Start: 2025-04-15

## 2025-04-15 RX ORDER — FUROSEMIDE 40 MG/1
40 TABLET ORAL DAILY
Qty: 30 TABLET | Refills: 2 | Status: SHIPPED | OUTPATIENT
Start: 2025-04-15

## 2025-04-15 NOTE — H&P (VIEW-ONLY)
Date of Office Visit: 04/15/2025  Encounter Provider: WICHO Lipscomb  Place of Service: [facility] CARDIOLOGY  Patient Name: Alfredo Toure  :1950    Chief Complaint   Patient presents with    Coronary Artery Disease   :     HPI: Alfredo Toure is a 74 y.o. male patient of  Dr. An's with a history of a bicuspid aortic valve, thoracic aortic aneurysm, AV block (status post permanent pacemaker), and coronary artery disease.       In , he underwent a CABG x 3 (LIMA to the mid LAD, reverse individual saphenous vein graft to the lateral marginal and diagonal), ascending aortic aneurysm repair, aortic valve replacement, and left atrial appendage ligation.    I saw him in March.  Unfortunately he had been diagnosed with metastatic prostate cancer.  He had been on Relugolix and enzalutamide since 2024.  He follows with the [facility].  He was being evaluated for a trial.  Since initiating chemotherapy he had lost about 40 pounds.  He was struggling with diarrhea, lightheadedness, and weakness.  Additionally, he reported worsening dyspnea on exertion.  His blood pressure was really low, and I discontinued several of his medications.    He contacted the office this morning to report weight gain and swelling since discontinuing the Lasix.  He was scheduled to see me.    After stopping his medications, he initially felt a lot better.  Reportedly he saw his primary care doctor last Monday who noted mild ankle swelling.  Since then, the swelling has gotten progressively worse.  He is reporting PND and orthopnea.  He has also gained about 20 pounds since he was here last.  He denies any chest pain, palpitations, dizziness, or syncope.  The diarrhea has resolved.  He is actually struggling with constipation now.  He has an appointment with oncology on Friday to discuss radiation.  His blood pressure has been running high over the last week.  Of note, he had a  thyroid biopsy today secondary to a nodule.  He is scheduled for a generator change tomorrow.    Past Medical History:   Diagnosis Date    Abnormal ECG 01/15/2019    40 beats per minute    Aneurysm 01/01/2010    No. Change in 12 years    Aortic stenosis     Arrhythmia     2ND DEGREE HEART BLOCK    Arthritis     HANDS    Ascending aortic aneurysm     Asthma     Now using an inhaler    Bleeding disorder     Bruise easily, thin blood    Bradycardia     Cancer     Melanoma, removed    Chronic cough     ALLERGIES    Colon polyp     Coronary artery disease 01\01\21    Diabetes mellitus     Diverticulitis     GERD (gastroesophageal reflux disease)     Heart murmur     Hiatal hernia     History of melanoma     BACK    Hyperlipidemia     Hypertension     Mitral valve prolapse     Pacemaker     Peptic ulceration     Post-nasal drip     Shortness of breath on exertion        Past Surgical History:   Procedure Laterality Date    AORTIC VALVE REPAIR/REPLACEMENT  06/14/2023    ASCENDING AORTIC ANEURYSM REPAIR W/ MECHANICAL AORTIC VALVE REPLACEMENT N/A 06/14/2023    Procedure: STERNOTOMY;  AORTIC ANEURYSM REPAIR AORTIC VALVE REPLACEMENT; AMPUTATION OF LEFT ATRIAL APPENDAGE;  TRANSESOPHAGEAL ECHOCARDIOGRAM WITH ANESTHESIA; PRP;  Surgeon: Ky Arce MD;  Location: [facility] CVOR;  Service: Cardiothoracic;  Laterality: N/A;    CARDIAC CATHETERIZATION      CARDIAC CATHETERIZATION N/A 05/22/2023    Procedure: Coronary angiography;  Surgeon: Loc nA MD;  Location: [facility] CATH INVASIVE LOCATION;  Service: Cardiology;  Laterality: N/A;    CARDIAC CATHETERIZATION N/A 05/22/2023    Procedure: Left Heart Cath;  Surgeon: Loc An MD;  Location: [facility] CATH INVASIVE LOCATION;  Service: Cardiology;  Laterality: N/A;    CARDIAC ELECTROPHYSIOLOGY PROCEDURE Left 02/15/2019    Procedure: Pacemaker DC new- [employer];  Surgeon: Zbigniew Tam MD;  Location: [facility] CATH INVASIVE LOCATION;  Service: Cardiology    CATARACT  EXTRACTION WITH INTRAOCULAR LENS IMPLANT Bilateral     CHOLECYSTECTOMY      COLONOSCOPY  2014    COLONOSCOPY N/A 04/26/2021    Procedure: COLONOSCOPY to cecum with cold polypectomy;  Surgeon: Zbigniew Washington MD;  Location: [facility] ENDOSCOPY;  Service: General;  Laterality: N/A;  Pre: h/x of polyps  Post: diverticulosis. polyp    CORONARY ARTERY BYPASS GRAFT N/A 06/14/2023    Procedure: STERNOTOMY; CORONARY ARTERY BYPASS X 3 WITH INTERNAL MAMMARY ARTERY GRAFT AND ENDOSCOPIC SAPHENOUS VEIN HARVEST FROM RIGHT LEG;  Surgeon: Ky Arce MD;  Location: [facility] CVOR;  Service: Cardiothoracic;  Laterality: N/A;    ENDOSCOPY N/A 04/26/2021    Procedure: ESOPHAGOGASTRODUODENOSCOPY with biopsies;  Surgeon: Zbigniew Washington MD;  Location: [facility] ENDOSCOPY;  Service: General;  Laterality: N/A;  Pre: reflux  Post: mild gastritis, hiatal hernia    ERCP      GALLSTONES    INSERT / REPLACE / REMOVE PACEMAKER      [facility] has records    LUMBAR DISCECTOMY      VEIN SURGERY  06/14/2023       Social History     Socioeconomic History    Marital status:    Tobacco Use    Smoking status: Never    Smokeless tobacco: Never    Tobacco comments:     Daily caffeine daily   Vaping Use    Vaping status: Never Used   Substance and Sexual Activity    Alcohol use: Yes     Comment: on occasion - monthly.   alcohol is not the problem    Drug use: No    Sexual activity: Not Currently     Partners: Female     Comment: ED       Family History   Problem Relation Age of Onset    Colon cancer Maternal Uncle     Asthma Sister     Malig Hyperthermia Neg Hx        Review of Systems   Constitutional: Positive for weight gain.   Cardiovascular:  Positive for dyspnea on exertion, leg swelling, orthopnea and paroxysmal nocturnal dyspnea. Negative for chest pain and syncope.   Respiratory: Negative.     Hematologic/Lymphatic: Negative for bleeding problem.   Musculoskeletal:  Negative for falls.   Gastrointestinal:  Negative for melena.  "  Neurological:  Negative for dizziness and light-headedness.       No Known Allergies      Current Outpatient Medications:     aspirin 81 MG EC tablet, Take 1 tablet by mouth Daily. PER MD OFFICE PT DOESN'T HAVE TO HOLD FOR SURGERY, Disp: , Rfl:     enzalutamide (XTANDI) 40 MG tablet tablet, Take 2 tablets by mouth Daily., Disp: , Rfl:     FARXIGA 10 MG tablet, Take 10 mg by mouth Daily., Disp: , Rfl: 3    finasteride (PROSCAR) 5 MG tablet, Take 1 tablet by mouth Daily., Disp: , Rfl:     omeprazole (PRILOSEC) 40 MG capsule, Take 1 capsule by mouth 2 (Two) Times a Day., Disp: 60 capsule, Rfl: 5    relugolix (ORGOVYX) 120 MG tablet tablet, Take 1 tablet by mouth Daily., Disp: , Rfl:     furosemide (LASIX) 40 MG tablet, Take 1 tablet by mouth Daily., Disp: 30 tablet, Rfl: 2    valsartan (DIOVAN) 160 MG tablet, Take 1 tablet by mouth Daily., Disp: 30 tablet, Rfl: 2      Objective:     Vitals:    04/15/25 1355   BP: 160/84   Pulse: 65   Weight: 80.3 kg (177 lb)   Height: 175.3 cm (69\")     Body mass index is 26.14 kg/m².    PHYSICAL EXAM:    Neck:      Vascular: No JVD.   Pulmonary:      Effort: Pulmonary effort is normal.      Breath sounds: Normal breath sounds.   Cardiovascular:      Normal rate. Regular rhythm.      Murmurs: There is a harsh midsystolic murmur at the URSB, radiating to the neck.      No gallop.  No click. No rub.   Pulses:     Intact distal pulses.           ECG 12 Lead    Date/Time: 4/15/2025 2:02 PM  Performed by: Fatuma Lyles APRN    Authorized by: Fatuma Lyles APRN  Comparison: compared with previous ECG from 3/26/2025  Similar to previous ECG  Rhythm: paced  Rate: normal  BPM: 65            Assessment:       Diagnosis Plan   1. Dyspnea on exertion  ECG 12 Lead    Adult Transthoracic Echo Complete W/ Cont if Necessary Per Protocol    Basic Metabolic Panel      2. Bilateral lower extremity edema        3. Nonrheumatic aortic valve stenosis        4. S/P AVR        5. Essential " hypertension          Orders Placed This Encounter   Procedures    Basic Metabolic Panel     Standing Status:   Future     Expected Date:   4/22/2025     Expiration Date:   4/15/2026     Release to patient:   Routine Release [[id number]]    ECG 12 Lead     This order was created via procedure documentation     Release to patient:   Routine Release [[id number]]    Adult Transthoracic Echo Complete W/ Cont if Necessary Per Protocol     Standing Status:   Future     Expected Date:   4/22/2025     Expiration Date:   4/15/2026     Reason for exam?:   Dyspnea     Release to patient:   Routine Release [[id number]]          Plan:       1.  Dyspnea on exertion/lower extremity edema.  His proBNP is 4,772.  It was normal last month.  Likely secondary to diastolic dysfunction, hypertension, and possibly his chemotherapy.  Echocardiogram from 2023 demonstrated normal LV function.  Recommended restarting Lasix and repeating an echocardiogram.      2.  Aortic stenosis.  History of AVR.  Echocardiogram from 2023 demonstrated a well-functioning bioprosthetic aortic valve.  As above, recommended repeating an echocardiogram.      3.  Hypertension.  Recommended resuming valsartan.  He was previously on 320 mg.  Recommended starting with 160 mg and see how his blood pressure responds.       I spoke with the EP regarding his symptoms and the plan of care.  They are okay with him proceeding with the generator change tomorrow.  I will call him with his echocardiogram results next week.  He will also need a BMP at that time.      As always, it has been a pleasure to participate in your patient's care.      Sincerely,         WICHO Araujo

## 2025-04-15 NOTE — PROGRESS NOTES
Date of Office Visit: 04/15/2025  Encounter Provider: WICHO Lipscomb  Place of Service: [facility] CARDIOLOGY  Patient Name: Alfredo Toure  :1950    Chief Complaint   Patient presents with    Coronary Artery Disease   :     HPI: Alfredo Toure is a 74 y.o. male patient of  Dr. An's with a history of a bicuspid aortic valve, thoracic aortic aneurysm, AV block (status post permanent pacemaker), and coronary artery disease.       In , he underwent a CABG x 3 (LIMA to the mid LAD, reverse individual saphenous vein graft to the lateral marginal and diagonal), ascending aortic aneurysm repair, aortic valve replacement, and left atrial appendage ligation.    I saw him in March.  Unfortunately he had been diagnosed with metastatic prostate cancer.  He had been on Relugolix and enzalutamide since 2024.  He follows with the [facility].  He was being evaluated for a trial.  Since initiating chemotherapy he had lost about 40 pounds.  He was struggling with diarrhea, lightheadedness, and weakness.  Additionally, he reported worsening dyspnea on exertion.  His blood pressure was really low, and I discontinued several of his medications.    He contacted the office this morning to report weight gain and swelling since discontinuing the Lasix.  He was scheduled to see me.    After stopping his medications, he initially felt a lot better.  Reportedly he saw his primary care doctor last Monday who noted mild ankle swelling.  Since then, the swelling has gotten progressively worse.  He is reporting PND and orthopnea.  He has also gained about 20 pounds since he was here last.  He denies any chest pain, palpitations, dizziness, or syncope.  The diarrhea has resolved.  He is actually struggling with constipation now.  He has an appointment with oncology on Friday to discuss radiation.  His blood pressure has been running high over the last week.  Of note, he had a  thyroid biopsy today secondary to a nodule.  He is scheduled for a generator change tomorrow.    Past Medical History:   Diagnosis Date    Abnormal ECG 01/15/2019    40 beats per minute    Aneurysm 01/01/2010    No. Change in 12 years    Aortic stenosis     Arrhythmia     2ND DEGREE HEART BLOCK    Arthritis     HANDS    Ascending aortic aneurysm     Asthma     Now using an inhaler    Bleeding disorder     Bruise easily, thin blood    Bradycardia     Cancer     Melanoma, removed    Chronic cough     ALLERGIES    Colon polyp     Coronary artery disease 01\01\21    Diabetes mellitus     Diverticulitis     GERD (gastroesophageal reflux disease)     Heart murmur     Hiatal hernia     History of melanoma     BACK    Hyperlipidemia     Hypertension     Mitral valve prolapse     Pacemaker     Peptic ulceration     Post-nasal drip     Shortness of breath on exertion        Past Surgical History:   Procedure Laterality Date    AORTIC VALVE REPAIR/REPLACEMENT  06/14/2023    ASCENDING AORTIC ANEURYSM REPAIR W/ MECHANICAL AORTIC VALVE REPLACEMENT N/A 06/14/2023    Procedure: STERNOTOMY;  AORTIC ANEURYSM REPAIR AORTIC VALVE REPLACEMENT; AMPUTATION OF LEFT ATRIAL APPENDAGE;  TRANSESOPHAGEAL ECHOCARDIOGRAM WITH ANESTHESIA; PRP;  Surgeon: Ky Arce MD;  Location: [facility] CVOR;  Service: Cardiothoracic;  Laterality: N/A;    CARDIAC CATHETERIZATION      CARDIAC CATHETERIZATION N/A 05/22/2023    Procedure: Coronary angiography;  Surgeon: Loc An MD;  Location: [facility] CATH INVASIVE LOCATION;  Service: Cardiology;  Laterality: N/A;    CARDIAC CATHETERIZATION N/A 05/22/2023    Procedure: Left Heart Cath;  Surgeon: Loc An MD;  Location: [facility] CATH INVASIVE LOCATION;  Service: Cardiology;  Laterality: N/A;    CARDIAC ELECTROPHYSIOLOGY PROCEDURE Left 02/15/2019    Procedure: Pacemaker DC new- [employer];  Surgeon: Zbigniew Tam MD;  Location: [facility] CATH INVASIVE LOCATION;  Service: Cardiology    CATARACT  EXTRACTION WITH INTRAOCULAR LENS IMPLANT Bilateral     CHOLECYSTECTOMY      COLONOSCOPY  2014    COLONOSCOPY N/A 04/26/2021    Procedure: COLONOSCOPY to cecum with cold polypectomy;  Surgeon: Zbigniew Washington MD;  Location: [facility] ENDOSCOPY;  Service: General;  Laterality: N/A;  Pre: h/x of polyps  Post: diverticulosis. polyp    CORONARY ARTERY BYPASS GRAFT N/A 06/14/2023    Procedure: STERNOTOMY; CORONARY ARTERY BYPASS X 3 WITH INTERNAL MAMMARY ARTERY GRAFT AND ENDOSCOPIC SAPHENOUS VEIN HARVEST FROM RIGHT LEG;  Surgeon: Ky Arce MD;  Location: [facility] CVOR;  Service: Cardiothoracic;  Laterality: N/A;    ENDOSCOPY N/A 04/26/2021    Procedure: ESOPHAGOGASTRODUODENOSCOPY with biopsies;  Surgeon: Zbigniew Washington MD;  Location: [facility] ENDOSCOPY;  Service: General;  Laterality: N/A;  Pre: reflux  Post: mild gastritis, hiatal hernia    ERCP      GALLSTONES    INSERT / REPLACE / REMOVE PACEMAKER      [facility] has records    LUMBAR DISCECTOMY      VEIN SURGERY  06/14/2023       Social History     Socioeconomic History    Marital status:    Tobacco Use    Smoking status: Never    Smokeless tobacco: Never    Tobacco comments:     Daily caffeine daily   Vaping Use    Vaping status: Never Used   Substance and Sexual Activity    Alcohol use: Yes     Comment: on occasion - monthly.   alcohol is not the problem    Drug use: No    Sexual activity: Not Currently     Partners: Female     Comment: ED       Family History   Problem Relation Age of Onset    Colon cancer Maternal Uncle     Asthma Sister     Malig Hyperthermia Neg Hx        Review of Systems   Constitutional: Positive for weight gain.   Cardiovascular:  Positive for dyspnea on exertion, leg swelling, orthopnea and paroxysmal nocturnal dyspnea. Negative for chest pain and syncope.   Respiratory: Negative.     Hematologic/Lymphatic: Negative for bleeding problem.   Musculoskeletal:  Negative for falls.   Gastrointestinal:  Negative for melena.  "  Neurological:  Negative for dizziness and light-headedness.       No Known Allergies      Current Outpatient Medications:     aspirin 81 MG EC tablet, Take 1 tablet by mouth Daily. PER MD OFFICE PT DOESN'T HAVE TO HOLD FOR SURGERY, Disp: , Rfl:     enzalutamide (XTANDI) 40 MG tablet tablet, Take 2 tablets by mouth Daily., Disp: , Rfl:     FARXIGA 10 MG tablet, Take 10 mg by mouth Daily., Disp: , Rfl: 3    finasteride (PROSCAR) 5 MG tablet, Take 1 tablet by mouth Daily., Disp: , Rfl:     omeprazole (PRILOSEC) 40 MG capsule, Take 1 capsule by mouth 2 (Two) Times a Day., Disp: 60 capsule, Rfl: 5    relugolix (ORGOVYX) 120 MG tablet tablet, Take 1 tablet by mouth Daily., Disp: , Rfl:     furosemide (LASIX) 40 MG tablet, Take 1 tablet by mouth Daily., Disp: 30 tablet, Rfl: 2    valsartan (DIOVAN) 160 MG tablet, Take 1 tablet by mouth Daily., Disp: 30 tablet, Rfl: 2      Objective:     Vitals:    04/15/25 1355   BP: 160/84   Pulse: 65   Weight: 80.3 kg (177 lb)   Height: 175.3 cm (69\")     Body mass index is 26.14 kg/m².    PHYSICAL EXAM:    Neck:      Vascular: No JVD.   Pulmonary:      Effort: Pulmonary effort is normal.      Breath sounds: Normal breath sounds.   Cardiovascular:      Normal rate. Regular rhythm.      Murmurs: There is a harsh midsystolic murmur at the URSB, radiating to the neck.      No gallop.  No click. No rub.   Pulses:     Intact distal pulses.           ECG 12 Lead    Date/Time: 4/15/2025 2:02 PM  Performed by: Fatuma Lyles APRN    Authorized by: Fatuma Lyles APRN  Comparison: compared with previous ECG from 3/26/2025  Similar to previous ECG  Rhythm: paced  Rate: normal  BPM: 65            Assessment:       Diagnosis Plan   1. Dyspnea on exertion  ECG 12 Lead    Adult Transthoracic Echo Complete W/ Cont if Necessary Per Protocol    Basic Metabolic Panel      2. Bilateral lower extremity edema        3. Nonrheumatic aortic valve stenosis        4. S/P AVR        5. Essential " hypertension          Orders Placed This Encounter   Procedures    Basic Metabolic Panel     Standing Status:   Future     Expected Date:   4/22/2025     Expiration Date:   4/15/2026     Release to patient:   Routine Release [[id number]]    ECG 12 Lead     This order was created via procedure documentation     Release to patient:   Routine Release [[id number]]    Adult Transthoracic Echo Complete W/ Cont if Necessary Per Protocol     Standing Status:   Future     Expected Date:   4/22/2025     Expiration Date:   4/15/2026     Reason for exam?:   Dyspnea     Release to patient:   Routine Release [[id number]]          Plan:       1.  Dyspnea on exertion/lower extremity edema.  His proBNP is 4,772.  It was normal last month.  Likely secondary to diastolic dysfunction, hypertension, and possibly his chemotherapy.  Echocardiogram from 2023 demonstrated normal LV function.  Recommended restarting Lasix and repeating an echocardiogram.      2.  Aortic stenosis.  History of AVR.  Echocardiogram from 2023 demonstrated a well-functioning bioprosthetic aortic valve.  As above, recommended repeating an echocardiogram.      3.  Hypertension.  Recommended resuming valsartan.  He was previously on 320 mg.  Recommended starting with 160 mg and see how his blood pressure responds.       I spoke with the EP regarding his symptoms and the plan of care.  They are okay with him proceeding with the generator change tomorrow.  I will call him with his echocardiogram results next week.  He will also need a BMP at that time.      As always, it has been a pleasure to participate in your patient's care.      Sincerely,         WICHO Araujo

## 2025-04-16 ENCOUNTER — HOSPITAL ENCOUNTER (OUTPATIENT)
Facility: HOSPITAL | Age: 75
Setting detail: HOSPITAL OUTPATIENT SURGERY
Discharge: HOME | End: 2025-04-16
Attending: INTERNAL MEDICINE | Admitting: INTERNAL MEDICINE
Payer: MEDICARE

## 2025-04-16 VITALS
HEIGHT: 68 IN | HEART RATE: 60 BPM | BODY MASS INDEX: 26.52 KG/M2 | RESPIRATION RATE: 16 BRPM | WEIGHT: 175 LBS | TEMPERATURE: 97 F | SYSTOLIC BLOOD PRESSURE: 156 MMHG | DIASTOLIC BLOOD PRESSURE: 71 MMHG | OXYGEN SATURATION: 100 %

## 2025-04-16 DIAGNOSIS — R00.1 SYMPTOMATIC BRADYCARDIA: ICD-10-CM

## 2025-04-16 LAB
GLUCOSE BLDC GLUCOMTR-MCNC: 101 MG/DL (ref 70–130)
QT INTERVAL: 414 MS
QTC INTERVAL: 417 MS

## 2025-04-16 PROCEDURE — 33228 REMV&REPLC PM GEN DUAL LEAD: CPT | Performed by: INTERNAL MEDICINE

## 2025-04-16 PROCEDURE — 93005 ELECTROCARDIOGRAM TRACING: CPT | Performed by: INTERNAL MEDICINE

## 2025-04-16 PROCEDURE — 25010000002 CEFAZOLIN PER 500 MG: Performed by: INTERNAL MEDICINE

## 2025-04-16 PROCEDURE — 25010000002 LIDOCAINE 1 % SOLUTION: Performed by: INTERNAL MEDICINE

## 2025-04-16 PROCEDURE — 82948 REAGENT STRIP/BLOOD GLUCOSE: CPT

## 2025-04-16 PROCEDURE — 25010000002 FENTANYL CITRATE (PF) 50 MCG/ML SOLUTION: Performed by: INTERNAL MEDICINE

## 2025-04-16 PROCEDURE — 25810000003 SODIUM CHLORIDE 0.9 % SOLUTION: Performed by: INTERNAL MEDICINE

## 2025-04-16 PROCEDURE — 25010000002 MIDAZOLAM PER 1 MG: Performed by: INTERNAL MEDICINE

## 2025-04-16 PROCEDURE — C1785 PMKR, DUAL, RATE-RESP: HCPCS | Performed by: INTERNAL MEDICINE

## 2025-04-16 DEVICE — GEN PM AZURE XT SURESCAN DR MRI: Type: IMPLANTABLE DEVICE | Status: FUNCTIONAL

## 2025-04-16 RX ORDER — SODIUM CHLORIDE 9 MG/ML
75 INJECTION, SOLUTION INTRAVENOUS CONTINUOUS
Status: DISCONTINUED | OUTPATIENT
Start: 2025-04-16 | End: 2025-04-16 | Stop reason: HOSPADM

## 2025-04-16 RX ORDER — LIDOCAINE HYDROCHLORIDE 10 MG/ML
INJECTION, SOLUTION INFILTRATION; PERINEURAL
Status: DISCONTINUED | OUTPATIENT
Start: 2025-04-16 | End: 2025-04-16 | Stop reason: HOSPADM

## 2025-04-16 RX ORDER — FENTANYL CITRATE 50 UG/ML
INJECTION, SOLUTION INTRAMUSCULAR; INTRAVENOUS
Status: DISCONTINUED | OUTPATIENT
Start: 2025-04-16 | End: 2025-04-16 | Stop reason: HOSPADM

## 2025-04-16 RX ORDER — MIDAZOLAM HYDROCHLORIDE 1 MG/ML
INJECTION, SOLUTION INTRAMUSCULAR; INTRAVENOUS
Status: DISCONTINUED | OUTPATIENT
Start: 2025-04-16 | End: 2025-04-16 | Stop reason: HOSPADM

## 2025-04-16 RX ORDER — SODIUM CHLORIDE 0.9 % (FLUSH) 0.9 %
10 SYRINGE (ML) INJECTION AS NEEDED
Status: DISCONTINUED | OUTPATIENT
Start: 2025-04-16 | End: 2025-04-16 | Stop reason: HOSPADM

## 2025-04-16 RX ORDER — METHOHEXITAL IN WATER/PF 100MG/10ML
SYRINGE (ML) INTRAVENOUS
Status: DISCONTINUED | OUTPATIENT
Start: 2025-04-16 | End: 2025-04-16 | Stop reason: HOSPADM

## 2025-04-16 RX ADMIN — SODIUM CHLORIDE 75 ML/HR: 9 INJECTION, SOLUTION INTRAVENOUS at 14:07

## 2025-04-16 NOTE — DISCHARGE INSTRUCTIONS
"[facility]   [phone]    Post Pacemaker / Defibrillator Implant Instructions      1.  The dressing may be removed the next day.    2. If steri-strips were used, they should not be removed. Allow them to \"fall off\".      3. You may shower after the dressing is removed. Do not allow shower water to hit directly on incision.    4. No lotion/powder/ointment/cream on incision until it is healed.    5. Gently wash incision daily with soap and water and pat dry.    6. You may reapply a dressing if there is drainage, otherwise leave your incision open to air. If you reapply a dressing, please notify the pacemaker clinic.    7. No heavy lifting, pulling, or pushing.    8. Do not raise the affected arm over your head for a minimum of 1 month.    9. The pacemaker clinic will contact you (usually within 1 business day) to schedule a pacemaker/incision check. The check is usually done 7-10 days post-implant. If you have not heard from the pacemaker clinic within 3 days, please call the office.    10.  No driving until seen at your follow up appointment    11. Please call the office if you experience any of the following:   bleeding or drainage from your incision   swelling, redness, or opening of your incision   fever or chills   pain not relieved with medication   chest pain or difficulty breathing   lightheadness    12. For defibrillator patients only: If you receive a shock from your device, please call the office. If you receive 2 or more shocks within a 24 hour period OR if you receive 1 shock and feel poorly, you should be evaluated in the emergency room. Please DO NOT DRIVE if you have received a shock until your device has been checked.  "

## 2025-04-17 NOTE — INTERVAL H&P NOTE
H&P reviewed. The patient was examined and there are no changes to the H&P.         given to Security

## 2025-04-18 LAB
QT INTERVAL: 513 MS
QTC INTERVAL: 513 MS

## 2025-04-21 ENCOUNTER — CLINICAL SUPPORT NO REQUIREMENTS (OUTPATIENT)
Age: 75
End: 2025-04-21
Payer: MEDICARE

## 2025-04-21 ENCOUNTER — LAB (OUTPATIENT)
Dept: LAB | Facility: HOSPITAL | Age: 75
End: 2025-04-21
Payer: MEDICARE

## 2025-04-21 ENCOUNTER — HOSPITAL ENCOUNTER (OUTPATIENT)
Dept: CARDIOLOGY | Facility: HOSPITAL | Age: 75
Discharge: HOME | End: 2025-04-21
Payer: MEDICARE

## 2025-04-21 VITALS
HEIGHT: 68 IN | HEART RATE: 88 BPM | SYSTOLIC BLOOD PRESSURE: 150 MMHG | BODY MASS INDEX: 26.52 KG/M2 | WEIGHT: 175 LBS | DIASTOLIC BLOOD PRESSURE: 90 MMHG

## 2025-04-21 DIAGNOSIS — R06.09 DYSPNEA ON EXERTION: ICD-10-CM

## 2025-04-21 DIAGNOSIS — I44.2 AV BLOCK, COMPLETE: Primary | ICD-10-CM

## 2025-04-21 LAB
ANION GAP SERPL CALCULATED.3IONS-SCNC: 9 MMOL/L (ref 5–15)
AORTIC ARCH: 2.6 CM
AORTIC DIMENSIONLESS INDEX: 0.4 (DI)
ASCENDING AORTA: 2.9 CM
AV MEAN PRESS GRAD SYS DOP V1V2: 7 MMHG
AV VMAX SYS DOP: 185 CM/SEC
BH CV ECHO AV AORTIC VALVE AT ACCEL TIME CALCULATED: NORMAL MSEC
BH CV ECHO MEAS - AO MAX PG: 13.7 MMHG
BH CV ECHO MEAS - AO ROOT DIAM: 3.6 CM
BH CV ECHO MEAS - AO V2 VTI: 41 CM
BH CV ECHO MEAS - AT: 111 SEC
BH CV ECHO MEAS - AVA(I,D): 1.57 CM2
BH CV ECHO MEAS - EDV(CUBED): 166.4 ML
BH CV ECHO MEAS - EDV(MOD-SP2): 120 ML
BH CV ECHO MEAS - EDV(MOD-SP4): 118 ML
BH CV ECHO MEAS - EF(MOD-SP2): 51.7 %
BH CV ECHO MEAS - EF(MOD-SP4): 55.9 %
BH CV ECHO MEAS - ESV(CUBED): 48.8 ML
BH CV ECHO MEAS - ESV(MOD-SP2): 58 ML
BH CV ECHO MEAS - ESV(MOD-SP4): 52 ML
BH CV ECHO MEAS - FS: 33.5 %
BH CV ECHO MEAS - IVS/LVPW: 1 CM
BH CV ECHO MEAS - IVSD: 1 CM
BH CV ECHO MEAS - LAT PEAK E' VEL: 12.6 CM/SEC
BH CV ECHO MEAS - LV DIASTOLIC VOL/BSA (35-75): 61.1 CM2
BH CV ECHO MEAS - LV MASS(C)D: 213.2 GRAMS
BH CV ECHO MEAS - LV MAX PG: 1.38 MMHG
BH CV ECHO MEAS - LV MEAN PG: 1 MMHG
BH CV ECHO MEAS - LV SYSTOLIC VOL/BSA (12-30): 26.9 CM2
BH CV ECHO MEAS - LV V1 MAX: 58.7 CM/SEC
BH CV ECHO MEAS - LV V1 VTI: 16.5 CM
BH CV ECHO MEAS - LVIDD: 5.5 CM
BH CV ECHO MEAS - LVIDS: 3.7 CM
BH CV ECHO MEAS - LVOT AREA: 3.9 CM2
BH CV ECHO MEAS - LVOT DIAM: 2.23 CM
BH CV ECHO MEAS - LVPWD: 1 CM
BH CV ECHO MEAS - MED PEAK E' VEL: 7.2 CM/SEC
BH CV ECHO MEAS - MR MAX PG: 59 MMHG
BH CV ECHO MEAS - MR MAX VEL: 384.1 CM/SEC
BH CV ECHO MEAS - MV A DUR: 0.11 SEC
BH CV ECHO MEAS - MV A MAX VEL: 79.3 CM/SEC
BH CV ECHO MEAS - MV DEC SLOPE: 244.9 CM/SEC2
BH CV ECHO MEAS - MV DEC TIME: 0.22 SEC
BH CV ECHO MEAS - MV E MAX VEL: 105 CM/SEC
BH CV ECHO MEAS - MV E/A: 1.32
BH CV ECHO MEAS - MV MAX PG: 4.1 MMHG
BH CV ECHO MEAS - MV MEAN PG: 2.25 MMHG
BH CV ECHO MEAS - MV P1/2T: 118.5 MSEC
BH CV ECHO MEAS - MV V2 VTI: 39.5 CM
BH CV ECHO MEAS - MVA(P1/2T): 1.86 CM2
BH CV ECHO MEAS - MVA(VTI): 1.63 CM2
BH CV ECHO MEAS - PA ACC TIME: 0.16 SEC
BH CV ECHO MEAS - PA V2 MAX: 99.1 CM/SEC
BH CV ECHO MEAS - PULM A REVS DUR: 0.11 SEC
BH CV ECHO MEAS - PULM A REVS VEL: 21.1 CM/SEC
BH CV ECHO MEAS - PULM DIAS VEL: 51 CM/SEC
BH CV ECHO MEAS - PULM S/D: 0.73
BH CV ECHO MEAS - PULM SYS VEL: 37.2 CM/SEC
BH CV ECHO MEAS - QP/QS: 0.81
BH CV ECHO MEAS - RAP SYSTOLE: 8 MMHG
BH CV ECHO MEAS - RV MAX PG: 1.17 MMHG
BH CV ECHO MEAS - RV V1 MAX: 54 CM/SEC
BH CV ECHO MEAS - RV V1 VTI: 13.1 CM
BH CV ECHO MEAS - RVOT DIAM: 2.25 CM
BH CV ECHO MEAS - RVSP: 25.7 MMHG
BH CV ECHO MEAS - SUP REN AO DIAM: 2.4 CM
BH CV ECHO MEAS - SV(LVOT): 64.2 ML
BH CV ECHO MEAS - SV(MOD-SP2): 62 ML
BH CV ECHO MEAS - SV(MOD-SP4): 66 ML
BH CV ECHO MEAS - SV(RVOT): 52 ML
BH CV ECHO MEAS - SVI(LVOT): 33.3 ML/M2
BH CV ECHO MEAS - SVI(MOD-SP2): 32.1 ML/M2
BH CV ECHO MEAS - SVI(MOD-SP4): 34.2 ML/M2
BH CV ECHO MEAS - TAPSE (>1.6): 2.03 CM
BH CV ECHO MEAS - TR MAX PG: 17.7 MMHG
BH CV ECHO MEAS - TR MAX VEL: 210.4 CM/SEC
BH CV ECHO MEASUREMENTS AVERAGE E/E' RATIO: 10.61
BH CV XLRA - RV BASE: 2.8 CM
BH CV XLRA - RV LENGTH: 8.2 CM
BH CV XLRA - RV MID: 2.9 CM
BH CV XLRA - TDI S': 12.1 CM/SEC
BUN SERPL-MCNC: 17 MG/DL (ref 8–23)
BUN/CREAT SERPL: 13.8 (ref 7–25)
CALCIUM SPEC-SCNC: 9.3 MG/DL (ref 8.6–10.5)
CHLORIDE SERPL-SCNC: 104 MMOL/L (ref 98–107)
CO2 SERPL-SCNC: 28 MMOL/L (ref 22–29)
CREAT SERPL-MCNC: 1.23 MG/DL (ref 0.76–1.27)
EGFRCR SERPLBLD CKD-EPI 2021: 60.8 ML/MIN/1.73
GLUCOSE SERPL-MCNC: 148 MG/DL (ref 65–99)
LEFT ATRIUM VOLUME INDEX: 26.2 ML/M2
LV EF BIPLANE MOD: 54.2 %
POTASSIUM SERPL-SCNC: 4.2 MMOL/L (ref 3.5–5.2)
SINUS: 3.3 CM
SODIUM SERPL-SCNC: 141 MMOL/L (ref 136–145)
STJ: 2.9 CM

## 2025-04-21 PROCEDURE — 36415 COLL VENOUS BLD VENIPUNCTURE: CPT

## 2025-04-21 PROCEDURE — 80048 BASIC METABOLIC PNL TOTAL CA: CPT

## 2025-04-21 PROCEDURE — 93306 TTE W/DOPPLER COMPLETE: CPT

## 2025-04-21 PROCEDURE — 93280 PM DEVICE PROGR EVAL DUAL: CPT | Performed by: INTERNAL MEDICINE

## 2025-04-21 PROCEDURE — 93306 TTE W/DOPPLER COMPLETE: CPT | Performed by: INTERNAL MEDICINE

## 2025-07-02 ENCOUNTER — LAB (OUTPATIENT)
Dept: LAB | Facility: HOSPITAL | Age: 75
End: 2025-07-02
Payer: MEDICARE

## 2025-07-02 ENCOUNTER — TRANSCRIBE ORDERS (OUTPATIENT)
Dept: ADMINISTRATIVE | Facility: HOSPITAL | Age: 75
End: 2025-07-02
Payer: MEDICARE

## 2025-07-02 DIAGNOSIS — E11.9 DIABETES MELLITUS WITHOUT COMPLICATION: ICD-10-CM

## 2025-07-02 DIAGNOSIS — E78.5 HYPERLIPIDEMIA, UNSPECIFIED HYPERLIPIDEMIA TYPE: Primary | ICD-10-CM

## 2025-07-02 DIAGNOSIS — E78.5 HYPERLIPIDEMIA, UNSPECIFIED HYPERLIPIDEMIA TYPE: ICD-10-CM

## 2025-07-02 DIAGNOSIS — I10 HYPERTENSION, UNSPECIFIED TYPE: ICD-10-CM

## 2025-07-02 LAB
ALBUMIN SERPL-MCNC: 3.6 G/DL (ref 3.5–5.2)
ALBUMIN/GLOB SERPL: 1.4 G/DL
ALP SERPL-CCNC: 99 U/L (ref 39–117)
ALT SERPL W P-5'-P-CCNC: 8 U/L (ref 1–41)
ANION GAP SERPL CALCULATED.3IONS-SCNC: 7.9 MMOL/L (ref 5–15)
AST SERPL-CCNC: 13 U/L (ref 1–40)
BILIRUB SERPL-MCNC: 0.2 MG/DL (ref 0–1.2)
BUN SERPL-MCNC: 21 MG/DL (ref 8–23)
BUN/CREAT SERPL: 16.5 (ref 7–25)
CALCIUM SPEC-SCNC: 9.8 MG/DL (ref 8.6–10.5)
CHLORIDE SERPL-SCNC: 103 MMOL/L (ref 98–107)
CHOLEST SERPL-MCNC: 209 MG/DL (ref 0–200)
CO2 SERPL-SCNC: 30.1 MMOL/L (ref 22–29)
CREAT SERPL-MCNC: 1.27 MG/DL (ref 0.76–1.27)
EGFRCR SERPLBLD CKD-EPI 2021: 58.6 ML/MIN/1.73
GLOBULIN UR ELPH-MCNC: 2.5 GM/DL
GLUCOSE SERPL-MCNC: 147 MG/DL (ref 65–99)
HBA1C MFR BLD: 5.8 % (ref 4.8–5.6)
HDLC SERPL-MCNC: 55 MG/DL (ref 40–60)
LDLC SERPL CALC-MCNC: 120 MG/DL (ref 0–100)
LDLC/HDLC SERPL: 2.1 {RATIO}
POTASSIUM SERPL-SCNC: 4.4 MMOL/L (ref 3.5–5.2)
PROT SERPL-MCNC: 6.1 G/DL (ref 6–8.5)
SODIUM SERPL-SCNC: 141 MMOL/L (ref 136–145)
TRIGL SERPL-MCNC: 192 MG/DL (ref 0–150)
VLDLC SERPL-MCNC: 34 MG/DL (ref 5–40)

## 2025-07-02 PROCEDURE — 80061 LIPID PANEL: CPT

## 2025-07-02 PROCEDURE — 36415 COLL VENOUS BLD VENIPUNCTURE: CPT

## 2025-07-02 PROCEDURE — 80053 COMPREHEN METABOLIC PANEL: CPT

## 2025-07-02 PROCEDURE — 83036 HEMOGLOBIN GLYCOSYLATED A1C: CPT

## 2025-07-07 RX ORDER — VALSARTAN 160 MG/1
160 TABLET ORAL DAILY
Qty: 90 TABLET | Refills: 3 | Status: SHIPPED | OUTPATIENT
Start: 2025-07-07

## 2025-07-07 RX ORDER — FUROSEMIDE 40 MG/1
40 TABLET ORAL DAILY
Qty: 90 TABLET | Refills: 3 | Status: SHIPPED | OUTPATIENT
Start: 2025-07-07

## 2025-07-18 ENCOUNTER — HOSPITAL ENCOUNTER (OUTPATIENT)
Dept: CT IMAGING | Facility: HOSPITAL | Age: 75
Discharge: HOME | End: 2025-07-18
Payer: MEDICARE

## 2025-07-18 DIAGNOSIS — Z86.79 S/P AORTIC ANEURYSM REPAIR: ICD-10-CM

## 2025-07-18 DIAGNOSIS — Z98.890 S/P AORTIC ANEURYSM REPAIR: ICD-10-CM

## 2025-07-18 PROCEDURE — 71250 CT THORAX DX C-: CPT

## 2025-08-09 LAB
MC_CV_MDC_IDC_RATE_1: 150
MC_CV_MDC_IDC_RATE_1: 171
MC_CV_MDC_IDC_THERAPIES: NORMAL
MC_CV_MDC_IDC_ZONE_ID: 2
MC_CV_MDC_IDC_ZONE_ID: 6
MDC_IDC_MSMT_BATTERY_REMAINING_LONGEVITY: 132 MO
MDC_IDC_MSMT_BATTERY_RRT_TRIGGER: 2.62
MDC_IDC_MSMT_BATTERY_STATUS: NORMAL
MDC_IDC_MSMT_BATTERY_VOLTAGE: 3.2
MDC_IDC_MSMT_LEADCHNL_RA_DTM: NORMAL
MDC_IDC_MSMT_LEADCHNL_RA_IMPEDANCE_VALUE: 456
MDC_IDC_MSMT_LEADCHNL_RA_PACING_THRESHOLD_AMPLITUDE: 0.88
MDC_IDC_MSMT_LEADCHNL_RA_PACING_THRESHOLD_POLARITY: NORMAL
MDC_IDC_MSMT_LEADCHNL_RA_PACING_THRESHOLD_PULSEWIDTH: 0.4
MDC_IDC_MSMT_LEADCHNL_RA_SENSING_INTR_AMPL: 1.38
MDC_IDC_MSMT_LEADCHNL_RV_DTM: NORMAL
MDC_IDC_MSMT_LEADCHNL_RV_IMPEDANCE_VALUE: 437
MDC_IDC_MSMT_LEADCHNL_RV_PACING_THRESHOLD_AMPLITUDE: 1.25
MDC_IDC_MSMT_LEADCHNL_RV_PACING_THRESHOLD_POLARITY: NORMAL
MDC_IDC_MSMT_LEADCHNL_RV_PACING_THRESHOLD_PULSEWIDTH: 0.4
MDC_IDC_PG_IMPLANT_DTM: NORMAL
MDC_IDC_PG_MFG: NORMAL
MDC_IDC_PG_MODEL: NORMAL
MDC_IDC_PG_SERIAL: NORMAL
MDC_IDC_PG_TYPE: NORMAL
MDC_IDC_SESS_DTM: NORMAL
MDC_IDC_SESS_TYPE: NORMAL
MDC_IDC_SET_BRADY_AT_MODE_SWITCH_RATE: 171
MDC_IDC_SET_BRADY_LOWRATE: 60
MDC_IDC_SET_BRADY_MAX_SENSOR_RATE: 130
MDC_IDC_SET_BRADY_MAX_TRACKING_RATE: 130
MDC_IDC_SET_BRADY_MODE: NORMAL
MDC_IDC_SET_BRADY_PAV_DELAY: 180
MDC_IDC_SET_BRADY_SAV_DELAY: 150
MDC_IDC_SET_LEADCHNL_RA_PACING_AMPLITUDE: 1.75
MDC_IDC_SET_LEADCHNL_RA_PACING_POLARITY: NORMAL
MDC_IDC_SET_LEADCHNL_RA_PACING_PULSEWIDTH: 0.4
MDC_IDC_SET_LEADCHNL_RA_SENSING_POLARITY: NORMAL
MDC_IDC_SET_LEADCHNL_RA_SENSING_SENSITIVITY: 0.3
MDC_IDC_SET_LEADCHNL_RV_PACING_AMPLITUDE: 2.5
MDC_IDC_SET_LEADCHNL_RV_PACING_POLARITY: NORMAL
MDC_IDC_SET_LEADCHNL_RV_PACING_PULSEWIDTH: 0.4
MDC_IDC_SET_LEADCHNL_RV_SENSING_POLARITY: NORMAL
MDC_IDC_SET_LEADCHNL_RV_SENSING_SENSITIVITY: 2
MDC_IDC_SET_ZONE_STATUS: NORMAL
MDC_IDC_SET_ZONE_STATUS: NORMAL
MDC_IDC_SET_ZONE_TYPE: NORMAL
MDC_IDC_SET_ZONE_TYPE: NORMAL
MDC_IDC_STAT_AT_BURDEN_PERCENT: 0
MDC_IDC_STAT_BRADY_RA_PERCENT_PACED: 5.95
MDC_IDC_STAT_BRADY_RV_PERCENT_PACED: 100

## 2025-08-12 ENCOUNTER — OFFICE VISIT (OUTPATIENT)
Dept: CARDIAC SURGERY | Facility: CLINIC | Age: 75
End: 2025-08-12
Payer: MEDICARE

## 2025-08-12 VITALS
BODY MASS INDEX: 26.66 KG/M2 | OXYGEN SATURATION: 98 % | DIASTOLIC BLOOD PRESSURE: 77 MMHG | RESPIRATION RATE: 18 BRPM | HEIGHT: 69 IN | SYSTOLIC BLOOD PRESSURE: 121 MMHG | HEART RATE: 71 BPM | TEMPERATURE: 97.1 F | WEIGHT: 180 LBS

## 2025-08-12 DIAGNOSIS — Z95.2 S/P AVR: ICD-10-CM

## 2025-08-12 DIAGNOSIS — Z98.890 S/P ASCENDING AORTIC ANEURYSM REPAIR: ICD-10-CM

## 2025-08-12 DIAGNOSIS — I10 ESSENTIAL HYPERTENSION: ICD-10-CM

## 2025-08-12 DIAGNOSIS — I71.21 ANEURYSM OF ASCENDING AORTA WITHOUT RUPTURE: Primary | ICD-10-CM

## 2025-08-12 DIAGNOSIS — R91.8 PULMONARY NODULES: ICD-10-CM

## 2025-08-12 DIAGNOSIS — Z86.79 S/P ASCENDING AORTIC ANEURYSM REPAIR: ICD-10-CM

## 2025-08-12 DIAGNOSIS — I35.0 NONRHEUMATIC AORTIC VALVE STENOSIS: ICD-10-CM

## 2025-08-12 PROCEDURE — 3078F DIAST BP <80 MM HG: CPT | Performed by: NURSE PRACTITIONER

## 2025-08-12 PROCEDURE — 3074F SYST BP LT 130 MM HG: CPT | Performed by: NURSE PRACTITIONER

## 2025-08-12 PROCEDURE — 99214 OFFICE O/P EST MOD 30 MIN: CPT | Performed by: NURSE PRACTITIONER

## 2025-08-12 PROCEDURE — 1159F MED LIST DOCD IN RCRD: CPT | Performed by: NURSE PRACTITIONER

## 2025-08-12 PROCEDURE — 1160F RVW MEDS BY RX/DR IN RCRD: CPT | Performed by: NURSE PRACTITIONER

## 2025-08-12 RX ORDER — SEMAGLUTIDE 1.34 MG/ML
0.5 INJECTION, SOLUTION SUBCUTANEOUS WEEKLY
COMMUNITY
Start: 2025-01-02

## 2025-08-19 LAB
MC_CV_MDC_IDC_RATE_1: 150
MC_CV_MDC_IDC_RATE_1: 171
MC_CV_MDC_IDC_THERAPIES: NORMAL
MC_CV_MDC_IDC_ZONE_ID: 2
MC_CV_MDC_IDC_ZONE_ID: 6
MDC_IDC_MSMT_BATTERY_REMAINING_LONGEVITY: 132 MO
MDC_IDC_MSMT_BATTERY_RRT_TRIGGER: 2.62
MDC_IDC_MSMT_BATTERY_STATUS: NORMAL
MDC_IDC_MSMT_BATTERY_VOLTAGE: 3.2
MDC_IDC_MSMT_LEADCHNL_RA_DTM: NORMAL
MDC_IDC_MSMT_LEADCHNL_RA_IMPEDANCE_VALUE: 456
MDC_IDC_MSMT_LEADCHNL_RA_PACING_THRESHOLD_AMPLITUDE: 0.88
MDC_IDC_MSMT_LEADCHNL_RA_PACING_THRESHOLD_POLARITY: NORMAL
MDC_IDC_MSMT_LEADCHNL_RA_PACING_THRESHOLD_PULSEWIDTH: 0.4
MDC_IDC_MSMT_LEADCHNL_RA_SENSING_INTR_AMPL: 1.38
MDC_IDC_MSMT_LEADCHNL_RV_DTM: NORMAL
MDC_IDC_MSMT_LEADCHNL_RV_IMPEDANCE_VALUE: 437
MDC_IDC_MSMT_LEADCHNL_RV_PACING_THRESHOLD_AMPLITUDE: 1.25
MDC_IDC_MSMT_LEADCHNL_RV_PACING_THRESHOLD_POLARITY: NORMAL
MDC_IDC_MSMT_LEADCHNL_RV_PACING_THRESHOLD_PULSEWIDTH: 0.4
MDC_IDC_PG_IMPLANT_DTM: NORMAL
MDC_IDC_PG_MFG: NORMAL
MDC_IDC_PG_MODEL: NORMAL
MDC_IDC_PG_SERIAL: NORMAL
MDC_IDC_PG_TYPE: NORMAL
MDC_IDC_SESS_DTM: NORMAL
MDC_IDC_SESS_TYPE: NORMAL
MDC_IDC_SET_BRADY_AT_MODE_SWITCH_RATE: 171
MDC_IDC_SET_BRADY_LOWRATE: 60
MDC_IDC_SET_BRADY_MAX_SENSOR_RATE: 130
MDC_IDC_SET_BRADY_MAX_TRACKING_RATE: 130
MDC_IDC_SET_BRADY_MODE: NORMAL
MDC_IDC_SET_BRADY_PAV_DELAY: 180
MDC_IDC_SET_BRADY_SAV_DELAY: 150
MDC_IDC_SET_LEADCHNL_RA_PACING_AMPLITUDE: 1.75
MDC_IDC_SET_LEADCHNL_RA_PACING_POLARITY: NORMAL
MDC_IDC_SET_LEADCHNL_RA_PACING_PULSEWIDTH: 0.4
MDC_IDC_SET_LEADCHNL_RA_SENSING_POLARITY: NORMAL
MDC_IDC_SET_LEADCHNL_RA_SENSING_SENSITIVITY: 0.3
MDC_IDC_SET_LEADCHNL_RV_PACING_AMPLITUDE: 2.5
MDC_IDC_SET_LEADCHNL_RV_PACING_POLARITY: NORMAL
MDC_IDC_SET_LEADCHNL_RV_PACING_PULSEWIDTH: 0.4
MDC_IDC_SET_LEADCHNL_RV_SENSING_POLARITY: NORMAL
MDC_IDC_SET_LEADCHNL_RV_SENSING_SENSITIVITY: 2
MDC_IDC_SET_ZONE_STATUS: NORMAL
MDC_IDC_SET_ZONE_STATUS: NORMAL
MDC_IDC_SET_ZONE_TYPE: NORMAL
MDC_IDC_SET_ZONE_TYPE: NORMAL
MDC_IDC_STAT_AT_BURDEN_PERCENT: 0
MDC_IDC_STAT_BRADY_RA_PERCENT_PACED: 5.95
MDC_IDC_STAT_BRADY_RV_PERCENT_PACED: 100

## (undated) DEVICE — INTRO SHEATH ART/FEM ENGAGE .035 5F12CM

## (undated) DEVICE — LOU PACE DEFIB: Brand: MEDLINE INDUSTRIES, INC.

## (undated) DEVICE — CATH DIAG IMPULSE FR4 5F 100CM

## (undated) DEVICE — TUBING, SUCTION, 1/4" X 10', STRAIGHT: Brand: MEDLINE

## (undated) DEVICE — GLIDESHEATH BASIC HYDROPHILIC COATED INTRODUCER SHEATH: Brand: GLIDESHEATH

## (undated) DEVICE — CATH GUIDE RIGHTSITE C315HIS-02

## (undated) DEVICE — LN SMPL CO2 SHTRM SD STREAM W/M LUER

## (undated) DEVICE — PLASMABLADE X PS210-030S-LIGHT 3.0SL: Brand: PLASMABLADE™ X

## (undated) DEVICE — BITEBLOCK OMNI BLOC

## (undated) DEVICE — EXOFIN PRECISION PEN HIGH VISCOSITY TOPICAL SKIN ADHESIVE: Brand: EXOFIN PRECISION PEN, 1G

## (undated) DEVICE — GW EMR FIX EXCHG J STD .035 3MM 260CM

## (undated) DEVICE — SOL IRR NACL 0.9PCT BT 1000ML

## (undated) DEVICE — CATH DIAG IMPULSE AL2 5F 100CM

## (undated) DEVICE — PENCL E/S HNDSWCH ROCKR CB

## (undated) DEVICE — SLITTER CATH GUIDE ATTAIN ADJ

## (undated) DEVICE — ELECTRD ECG CARBON/SNP RL FM A/ 5PK

## (undated) DEVICE — PK CATH CARD 40

## (undated) DEVICE — CATH DIAG IMPULSE MPA2 SH 5F 100CM

## (undated) DEVICE — SENSR O2 OXIMAX FNGR A/ 18IN NONSTR

## (undated) DEVICE — INTRO SHEATH PRELUDE SNAP .038 7F 13CM W/SDPRT

## (undated) DEVICE — AIRLIFE™ NASAL OXYGEN CANNULA CURVED, NONFLARED TIP WITH 21 FOOT (6.4 M) CRUSH-RESISTANT TUBING, OVER-THE-EAR STYLE: Brand: AIRLIFE™

## (undated) DEVICE — INTRO SHEATH PRELUDE SNAP .038 8F 13CM W/SDPRT

## (undated) DEVICE — Device

## (undated) DEVICE — THE TORRENT IRRIGATION SCOPE CONNECTOR IS USED WITH THE TORRENT IRRIGATION TUBING TO PROVIDE IRRIGATION FLUIDS SUCH AS STERILE WATER DURING GASTROINTESTINAL ENDOSCOPIC PROCEDURES WHEN USED IN CONJUNCTION WITH AN IRRIGATION PUMP (OR ELECTROSURGICAL UNIT).: Brand: TORRENT

## (undated) DEVICE — LIMB HOLDER, WRIST/ANKLE: Brand: DEROYAL

## (undated) DEVICE — CATH DIAG IMPULSE AL1 5F 100CM

## (undated) DEVICE — KT MANIFLD CARDIAC

## (undated) DEVICE — CANN O2 ETCO2 FITS ALL CONN CO2 SMPL A/ 7IN DISP LF

## (undated) DEVICE — ADAPT CLN BIOGUARD AIR/H2O DISP

## (undated) DEVICE — CATH DIAG CARD PERFORMA JL3.5 BT 4F100CM

## (undated) DEVICE — CATH DIAG IMPULSE FL4 5F 100CM

## (undated) DEVICE — KT ORCA ORCAPOD DISP STRL

## (undated) DEVICE — CATH DIAG IMPULSE FL3.5 5F 100CM

## (undated) DEVICE — FRCP BX RADJAW4 NDL 2.8 240CM LG OG BX40